# Patient Record
Sex: FEMALE | Race: WHITE | NOT HISPANIC OR LATINO | URBAN - METROPOLITAN AREA
[De-identification: names, ages, dates, MRNs, and addresses within clinical notes are randomized per-mention and may not be internally consistent; named-entity substitution may affect disease eponyms.]

---

## 2018-10-20 ENCOUNTER — INPATIENT (INPATIENT)
Facility: HOSPITAL | Age: 83
LOS: 5 days | Discharge: HOME | DRG: 65 | End: 2018-10-26
Attending: INTERNAL MEDICINE | Admitting: INTERNAL MEDICINE
Payer: MEDICARE

## 2018-10-20 VITALS — WEIGHT: 179.9 LBS | HEIGHT: 68 IN

## 2018-10-20 DIAGNOSIS — R42 DIZZINESS AND GIDDINESS: ICD-10-CM

## 2018-10-20 DIAGNOSIS — Z95.1 PRESENCE OF AORTOCORONARY BYPASS GRAFT: Chronic | ICD-10-CM

## 2018-10-20 PROBLEM — Z00.00 ENCOUNTER FOR PREVENTIVE HEALTH EXAMINATION: Status: ACTIVE | Noted: 2018-10-20

## 2018-10-20 LAB
ALBUMIN SERPL ELPH-MCNC: 3.8 G/DL — SIGNIFICANT CHANGE UP (ref 3.3–5.2)
ALP SERPL-CCNC: 55 U/L — SIGNIFICANT CHANGE UP (ref 40–120)
ALT FLD-CCNC: 17 U/L — SIGNIFICANT CHANGE UP
ANION GAP SERPL CALC-SCNC: 11 MMOL/L — SIGNIFICANT CHANGE UP (ref 5–17)
AST SERPL-CCNC: 21 U/L — SIGNIFICANT CHANGE UP
BASOPHILS # BLD AUTO: 0 K/UL — SIGNIFICANT CHANGE UP (ref 0–0.2)
BASOPHILS NFR BLD AUTO: 0.6 % — SIGNIFICANT CHANGE UP (ref 0–2)
BILIRUB SERPL-MCNC: 0.8 MG/DL — SIGNIFICANT CHANGE UP (ref 0.4–2)
BUN SERPL-MCNC: 42 MG/DL — HIGH (ref 8–20)
CALCIUM SERPL-MCNC: 10.3 MG/DL — HIGH (ref 8.6–10.2)
CHLORIDE SERPL-SCNC: 104 MMOL/L — SIGNIFICANT CHANGE UP (ref 98–107)
CO2 SERPL-SCNC: 25 MMOL/L — SIGNIFICANT CHANGE UP (ref 22–29)
CREAT SERPL-MCNC: 1.9 MG/DL — HIGH (ref 0.5–1.3)
EOSINOPHIL # BLD AUTO: 0.2 K/UL — SIGNIFICANT CHANGE UP (ref 0–0.5)
EOSINOPHIL NFR BLD AUTO: 2.2 % — SIGNIFICANT CHANGE UP (ref 0–6)
GLUCOSE SERPL-MCNC: 119 MG/DL — HIGH (ref 70–115)
HCT VFR BLD CALC: 44.2 % — SIGNIFICANT CHANGE UP (ref 37–47)
HGB BLD-MCNC: 14.9 G/DL — SIGNIFICANT CHANGE UP (ref 12–16)
LYMPHOCYTES # BLD AUTO: 2.8 K/UL — SIGNIFICANT CHANGE UP (ref 1–4.8)
LYMPHOCYTES # BLD AUTO: 35.2 % — SIGNIFICANT CHANGE UP (ref 20–55)
MAGNESIUM SERPL-MCNC: 2.1 MG/DL — SIGNIFICANT CHANGE UP (ref 1.8–2.6)
MCHC RBC-ENTMCNC: 31 PG — SIGNIFICANT CHANGE UP (ref 27–31)
MCHC RBC-ENTMCNC: 33.7 G/DL — SIGNIFICANT CHANGE UP (ref 32–36)
MCV RBC AUTO: 92.1 FL — SIGNIFICANT CHANGE UP (ref 81–99)
MONOCYTES # BLD AUTO: 0.8 K/UL — SIGNIFICANT CHANGE UP (ref 0–0.8)
MONOCYTES NFR BLD AUTO: 10.1 % — HIGH (ref 3–10)
NEUTROPHILS # BLD AUTO: 4.2 K/UL — SIGNIFICANT CHANGE UP (ref 1.8–8)
NEUTROPHILS NFR BLD AUTO: 51.7 % — SIGNIFICANT CHANGE UP (ref 37–73)
PLATELET # BLD AUTO: 224 K/UL — SIGNIFICANT CHANGE UP (ref 150–400)
POTASSIUM SERPL-MCNC: 5 MMOL/L — SIGNIFICANT CHANGE UP (ref 3.5–5.3)
POTASSIUM SERPL-SCNC: 5 MMOL/L — SIGNIFICANT CHANGE UP (ref 3.5–5.3)
PROT SERPL-MCNC: 7.4 G/DL — SIGNIFICANT CHANGE UP (ref 6.6–8.7)
RBC # BLD: 4.8 M/UL — SIGNIFICANT CHANGE UP (ref 4.4–5.2)
RBC # FLD: 12.2 % — SIGNIFICANT CHANGE UP (ref 11–15.6)
SODIUM SERPL-SCNC: 140 MMOL/L — SIGNIFICANT CHANGE UP (ref 135–145)
TROPONIN T SERPL-MCNC: 0.04 NG/ML — SIGNIFICANT CHANGE UP (ref 0–0.06)
WBC # BLD: 8 K/UL — SIGNIFICANT CHANGE UP (ref 4.8–10.8)
WBC # FLD AUTO: 8 K/UL — SIGNIFICANT CHANGE UP (ref 4.8–10.8)

## 2018-10-20 PROCEDURE — 70551 MRI BRAIN STEM W/O DYE: CPT | Mod: 26

## 2018-10-20 PROCEDURE — 93010 ELECTROCARDIOGRAM REPORT: CPT

## 2018-10-20 PROCEDURE — 99285 EMERGENCY DEPT VISIT HI MDM: CPT

## 2018-10-20 PROCEDURE — 99223 1ST HOSP IP/OBS HIGH 75: CPT

## 2018-10-20 PROCEDURE — 70450 CT HEAD/BRAIN W/O DYE: CPT | Mod: 26

## 2018-10-20 RX ORDER — DIAZEPAM 5 MG
1 TABLET ORAL DAILY
Qty: 0 | Refills: 0 | Status: DISCONTINUED | OUTPATIENT
Start: 2018-10-20 | End: 2018-10-26

## 2018-10-20 RX ORDER — ALLOPURINOL 300 MG
50 TABLET ORAL DAILY
Qty: 0 | Refills: 0 | Status: DISCONTINUED | OUTPATIENT
Start: 2018-10-20 | End: 2018-10-26

## 2018-10-20 RX ORDER — METOPROLOL TARTRATE 50 MG
100 TABLET ORAL DAILY
Qty: 0 | Refills: 0 | Status: DISCONTINUED | OUTPATIENT
Start: 2018-10-20 | End: 2018-10-20

## 2018-10-20 RX ORDER — LOSARTAN POTASSIUM 100 MG/1
1 TABLET, FILM COATED ORAL
Qty: 0 | Refills: 0 | COMMUNITY

## 2018-10-20 RX ORDER — ATORVASTATIN CALCIUM 80 MG/1
40 TABLET, FILM COATED ORAL AT BEDTIME
Qty: 0 | Refills: 0 | Status: DISCONTINUED | OUTPATIENT
Start: 2018-10-20 | End: 2018-10-26

## 2018-10-20 RX ORDER — ASPIRIN/CALCIUM CARB/MAGNESIUM 324 MG
81 TABLET ORAL DAILY
Qty: 0 | Refills: 0 | Status: DISCONTINUED | OUTPATIENT
Start: 2018-10-20 | End: 2018-10-26

## 2018-10-20 RX ORDER — SODIUM CHLORIDE 9 MG/ML
500 INJECTION INTRAMUSCULAR; INTRAVENOUS; SUBCUTANEOUS ONCE
Qty: 0 | Refills: 0 | Status: COMPLETED | OUTPATIENT
Start: 2018-10-20 | End: 2018-10-20

## 2018-10-20 RX ADMIN — Medication 1 MILLIGRAM(S): at 22:36

## 2018-10-20 RX ADMIN — ATORVASTATIN CALCIUM 40 MILLIGRAM(S): 80 TABLET, FILM COATED ORAL at 22:37

## 2018-10-20 RX ADMIN — SODIUM CHLORIDE 500 MILLILITER(S): 9 INJECTION INTRAMUSCULAR; INTRAVENOUS; SUBCUTANEOUS at 22:37

## 2018-10-20 RX ADMIN — Medication 81 MILLIGRAM(S): at 22:37

## 2018-10-20 NOTE — ED STATDOCS - PROGRESS NOTE DETAILS
86 y/o F pt with hx of multiple syncopic falls and CAD presents to ED with son at bedside. Per son, she  fell in July 2018; CT scan of head in [place] was reportedly normal but since that fall has had dizziness. Additionally she has worsened over the last week and 2 days ago developed gargled speech. will be transferred to [facility] for further evaluation by another provider.

## 2018-10-20 NOTE — H&P ADULT - HISTORY OF PRESENT ILLNESS
87y/oF primarily Serbian speaking, hx CAD s/p CABG, HTN, DM presenting with intermittent lightheadedness associated with episodes of difficulty speaking and progressive leg weakness for the past week.  Pt has just returned from [place] on 10/16 when she began to experience these symptoms.  Her children did not take her to hospital initially because they she might have been jet-lagged from flying or that it could have been an adverse effect from the valium that she uses for muscles spasms.   There was report of pt having altered mental status to ED providers, but pt and family denying that at this time.  In ED, pt had CT head that showed moderate microvascular changes and MRI brain showed acute infarcts left posterior periventricular white matter, and left external capsule.  EKG also showed Afib.  Pt and family denies hx of Afib. Pt denies any headache, visual changes, chest pain, palpitations, dyspnea, abdominal pain, nausea, vomiting, diarrhea. 87y/oF primarily [place] speaking, hx CAD s/p CABG, HTN, DM presenting with intermittent lightheadedness associated with episodes of difficulty speaking and progressive leg weakness for the past week.  Pt has just returned from [place] on 10/16 when she began to experience these symptoms.  Her children did not take her to hospital initially because they she might have been jet-lagged from flying or that it could have been an adverse effect from the valium that she uses for muscles spasms.  In ED, pt had CT head that showed moderate microvascular changes and MRI brain showed acute infarcts left posterior periventricular white matter, and left external capsule.  EKG also showed Afib.  Pt and family denies hx of Afib. Pt denies any headache, visual changes, chest pain, palpitations, dyspnea, abdominal pain, nausea, vomiting, diarrhea.

## 2018-10-20 NOTE — ED PROVIDER NOTE - MEDICAL DECISION MAKING DETAILS
Patient with dizziness; unstable gait and reports of intermittent AMS and slurred speech.  Will check EKG, Labs, CT and Re-eval.  Possible admission for r/o TIA.

## 2018-10-20 NOTE — H&P ADULT - NSHPSOCIALHISTORY_GEN_ALL_CORE
[place] in [place] during [place] except summer and early fall where she spends time in [place], denies any tobacco or alcohol use

## 2018-10-20 NOTE — H&P ADULT - NSHPPHYSICALEXAM_GEN_ALL_CORE
Vital Signs Last 24 Hrs  T(C): 36.6 (20 Oct 2018 11:06), Max: 36.6 (20 Oct 2018 11:06)  T(F): 97.8 (20 Oct 2018 11:06), Max: 97.8 (20 Oct 2018 11:06)  HR: 62 (20 Oct 2018 20:54) (62 - 65)  BP: 180/75 (20 Oct 2018 20:54) (159/79 - 180/75)  BP(mean): --  RR: 19 (20 Oct 2018 20:54) (17 - 19)  SpO2: 97% (20 Oct 2018 20:54) (96% - 97%)    GENERAL:  Well-appearing, not in acute distress  EYES:  Clear conjunctiva, pupils reactive to light  ENT: Moist mucous membranes  RESP:  Non-labored breathing pattern, lungs clear to ausculation   CV: Regular rate and rhythm, no murmurs appreciated, no lower extremity edema  GI: Soft, non-tender, non-distended  NEURO: Awake, alert, conversant, no facial symmetry, upper extremity strength 5/5, lower extremity strength 3/5 light touch sensation grossly intact  PSYCH: Calm, cooperative  SKIN: No rash or lesions, warm and dry

## 2018-10-20 NOTE — H&P ADULT - PMH
CAD (coronary artery disease)    DM (diabetes mellitus)    Gout    HTN (hypertension)    Meniere disease

## 2018-10-20 NOTE — H&P ADULT - ASSESSMENT
87y/oF primarily Welsh speaking, hx CAD s/p CABG, HTN, DM presenting with intermittent lightheadedness associated with episodes of difficulty speaking and progressive leg weakness for 1 week found to have acute CVA in left posterior periventricular white matter and left external capsule with Afib on EKG and no prior hx of Afib per pt/family    #Acute CVA in setting of ?new Afib   -Admit to step down unit  -Passed dysphagia screen in ED, recommended pureed diet  -ASA ordered  -Simvastatin changed to atorvastatin since higher potency  -HgbA1c, lipid panel  -PT, OT, speech and swallow ordered   -Neuro ([facility]) called for eval in AM  -Card ([facility]) called for eval in AM, re: suspected new onset Afib    #CAD-ASA, statin    #HTN- holding metoprolol and losartan to allow for permissive hypertensive during CVA    #DM- holding glipizide and Tradjenta insulin sliding scale ordered    #Muscle spasm- Valium PRN resumed    #Prophylactic measure- intermittent pneumatic compressions 87y/oF primarily Spanish speaking, hx CAD s/p CABG, HTN, DM presenting with intermittent lightheadedness associated with episodes of difficulty speaking and progressive leg weakness for 1 week found to have acute CVA in left posterior periventricular white matter and left external capsule with Afib on EKG and no prior hx of Afib per pt/family    #Acute CVA in setting of ?new Afib   -Admit to step down unit  -Passed dysphagia screen in ED, recommended pureed diet  -ASA ordered  -Simvastatin changed to atorvastatin since higher potency  -Carotid US ordered  -HgbA1c, lipid panel  -PT, OT, speech and swallow ordered   -Neuro ([facility]) called for eval in AM  -Card ([facility]) called for eval in AM, re: suspected new onset Afib    #CAD-ASA, statin    #HTN- holding metoprolol and losartan to allow for permissive hypertensive during CVA    #DM- holding glipizide and Tradjenta insulin sliding scale ordered    #Muscle spasm- Valium PRN resumed    #Prophylactic measure- intermittent pneumatic compressions 87y/oF primarily Nepali speaking, hx CAD s/p CABG, HTN, DM presenting with intermittent lightheadedness associated with episodes of difficulty speaking and progressive leg weakness for 1 week found to have acute CVA in left posterior periventricular white matter and left external capsule with Afib on EKG and no prior hx of Afib per pt/family    #Acute CVA in setting of new Afib   -Admit to step down unit  -Passed dysphagia screen in ED, recommended pureed diet  -ASA ordered  -Simvastatin changed to atorvastatin since higher potency  -Carotid US ordered  -HgbA1c, lipid panel  -PT, OT, speech and swallow ordered   -Neuro ([facility]) called for eval in AM  -Card ([facility]) called for eval in AM, re: new onset Afib    #CHAZ on ?CKD, unknown Cr baseline  -500cc NS fluid challenge  -Repeat BMP in AM    #CAD-ASA, statin    #HTN- holding metoprolol and losartan to allow for permissive hypertensive during CVA    #DM- holding glipizide and Tradjenta insulin sliding scale ordered    #Muscle spasm- Valium PRN resumed    #Prophylactic measure- intermittent pneumatic compressions

## 2018-10-20 NOTE — ED ADULT NURSE NOTE - NSIMPLEMENTINTERV_GEN_ALL_ED
Implemented All Fall with Harm Risk Interventions:  [place] to call system. Call bell, personal items and telephone within reach. Instruct patient to call for assistance. Room bathroom lighting operational. Non-slip footwear when patient is off stretcher. Physically safe environment: no spills, clutter or unnecessary equipment. Stretcher in lowest position, wheels locked, appropriate side rails in place. Provide visual cue, wrist band, yellow gown, etc. Monitor gait and stability. Monitor for mental status changes and reorient to person, place, and time. Review medications for side effects contributing to fall risk. Reinforce activity limits and safety measures with patient and family. Provide visual clues: red socks.

## 2018-10-20 NOTE — ED ADULT NURSE NOTE - NURSING NEURO ORIENTATION
oriented to person, place and time Secondary Intention Text (Leave Blank If You Do Not Want): The defect will heal with secondary intention.

## 2018-10-20 NOTE — ED PROVIDER NOTE - OBJECTIVE STATEMENT
This patient is an 87 year old woman with hx of DM and HTN who presents to the ER with her family with reports of AMS and dizziness.  Patient has been experiencing dizziness for over 2 weeks.  She does report a fall about 4 months ago but states that she had a negative CT head at that time.  Patient states that the dizziness is worse when she tries to ambulate.  Her family states that the patient has unsteady gait and has to hold on to furniture in order to prevent from falling over.  She denies headache, visual changes and vomiting.  Family also reports increased confusion for the past weeks and 2 days of intermittent slurred speech.  Patient has prior EKG that was done in [place].

## 2018-10-20 NOTE — H&P ADULT - NSHPLABSRESULTS_GEN_ALL_CORE
14.9   8.0   )-----------( 224      ( 20 Oct 2018 12:01 )               44.2   10-20    140  |  104  |  42.0<H>  ----------------------------<  119<H>  5.0   |  25.0  |  1.90<H>    Ca    10.3<H>      20 Oct 2018 12:01  Mg     2.1     10-20    TPro  7.4  /  Alb  3.8  /  TBili  0.8  /  DBili  x   /  AST  21  /  ALT  17  /  AlkPhos  55  10-20      EKG: Afib, bifascicular block, , QTc 508    < from: CT Head No Cont (10.20.18 @ 14:33) >    IMPRESSION:  Moderate chronic microvascular changes without evidence of   an acute transcortical infarction or hemorrhage. MR is a more sensitive   imaging modality for the evaluation of an acute infarction.       < from: MR Head No Cont (10.20.18 @ 18:42) >    IMPRESSION:   Acute infarcts left posterior periventricular white matter, and left external capsule.

## 2018-10-20 NOTE — ED ADULT TRIAGE NOTE - CHIEF COMPLAINT QUOTE
Fell and hit head in [place] in June.  Had negative CT scan.  [place] on 10[place].  Slight Confusion and dizziness began 2 weeks ago.  Confusion worse since arrival on 10/16. Difficulty speaking started 2 days ago.

## 2018-10-21 ENCOUNTER — TRANSCRIPTION ENCOUNTER (OUTPATIENT)
Age: 83
End: 2018-10-21

## 2018-10-21 DIAGNOSIS — I63.9 CEREBRAL INFARCTION, UNSPECIFIED: ICD-10-CM

## 2018-10-21 DIAGNOSIS — I25.10 ATHEROSCLEROTIC HEART DISEASE OF NATIVE CORONARY ARTERY WITHOUT ANGINA PECTORIS: ICD-10-CM

## 2018-10-21 DIAGNOSIS — E11.42 TYPE 2 DIABETES MELLITUS WITH DIABETIC POLYNEUROPATHY: ICD-10-CM

## 2018-10-21 DIAGNOSIS — I48.0 PAROXYSMAL ATRIAL FIBRILLATION: ICD-10-CM

## 2018-10-21 DIAGNOSIS — R26.81 UNSTEADINESS ON FEET: ICD-10-CM

## 2018-10-21 DIAGNOSIS — I10 ESSENTIAL (PRIMARY) HYPERTENSION: ICD-10-CM

## 2018-10-21 LAB
ANION GAP SERPL CALC-SCNC: 14 MMOL/L — SIGNIFICANT CHANGE UP (ref 5–17)
APPEARANCE UR: CLEAR — SIGNIFICANT CHANGE UP
BACTERIA # UR AUTO: ABNORMAL
BILIRUB UR-MCNC: NEGATIVE — SIGNIFICANT CHANGE UP
BUN SERPL-MCNC: 42 MG/DL — HIGH (ref 8–20)
CALCIUM SERPL-MCNC: 10.2 MG/DL — SIGNIFICANT CHANGE UP (ref 8.6–10.2)
CHLORIDE SERPL-SCNC: 108 MMOL/L — HIGH (ref 98–107)
CHOLEST SERPL-MCNC: 240 MG/DL — HIGH (ref 110–199)
CO2 SERPL-SCNC: 19 MMOL/L — LOW (ref 22–29)
COLOR SPEC: YELLOW — SIGNIFICANT CHANGE UP
CREAT ?TM UR-MCNC: 91 MG/DL — SIGNIFICANT CHANGE UP
CREAT SERPL-MCNC: 1.64 MG/DL — HIGH (ref 0.5–1.3)
DIFF PNL FLD: ABNORMAL
EPI CELLS # UR: SIGNIFICANT CHANGE UP
GLUCOSE BLDC GLUCOMTR-MCNC: 166 MG/DL — HIGH (ref 70–99)
GLUCOSE BLDC GLUCOMTR-MCNC: 194 MG/DL — HIGH (ref 70–99)
GLUCOSE BLDC GLUCOMTR-MCNC: 212 MG/DL — HIGH (ref 70–99)
GLUCOSE SERPL-MCNC: 181 MG/DL — HIGH (ref 70–115)
GLUCOSE UR QL: 50 MG/DL
HBA1C BLD-MCNC: 7.5 % — HIGH (ref 4–5.6)
HDLC SERPL-MCNC: 41 MG/DL — LOW
KETONES UR-MCNC: NEGATIVE — SIGNIFICANT CHANGE UP
LEUKOCYTE ESTERASE UR-ACNC: ABNORMAL
LIPID PNL WITH DIRECT LDL SERPL: 155 MG/DL — SIGNIFICANT CHANGE UP
NITRITE UR-MCNC: NEGATIVE — SIGNIFICANT CHANGE UP
OSMOLALITY UR: 533 MOSM/KG — SIGNIFICANT CHANGE UP (ref 300–1000)
PH UR: 6 — SIGNIFICANT CHANGE UP (ref 5–8)
POTASSIUM SERPL-MCNC: 4.3 MMOL/L — SIGNIFICANT CHANGE UP (ref 3.5–5.3)
POTASSIUM SERPL-SCNC: 4.3 MMOL/L — SIGNIFICANT CHANGE UP (ref 3.5–5.3)
PROT UR-MCNC: 100 MG/DL
RBC CASTS # UR COMP ASSIST: SIGNIFICANT CHANGE UP /HPF (ref 0–4)
SODIUM SERPL-SCNC: 141 MMOL/L — SIGNIFICANT CHANGE UP (ref 135–145)
SODIUM UR-SCNC: 70 MMOL/L — SIGNIFICANT CHANGE UP
SP GR SPEC: 1.01 — SIGNIFICANT CHANGE UP (ref 1.01–1.02)
TOTAL CHOLESTEROL/HDL RATIO MEASUREMENT: 6 RATIO — SIGNIFICANT CHANGE UP (ref 3.3–7.1)
TRIGL SERPL-MCNC: 219 MG/DL — HIGH (ref 10–200)
UROBILINOGEN FLD QL: NEGATIVE MG/DL — SIGNIFICANT CHANGE UP
WBC UR QL: ABNORMAL

## 2018-10-21 PROCEDURE — 99223 1ST HOSP IP/OBS HIGH 75: CPT

## 2018-10-21 PROCEDURE — 93880 EXTRACRANIAL BILAT STUDY: CPT | Mod: 26

## 2018-10-21 PROCEDURE — 71045 X-RAY EXAM CHEST 1 VIEW: CPT | Mod: 26

## 2018-10-21 PROCEDURE — 93306 TTE W/DOPPLER COMPLETE: CPT | Mod: 26

## 2018-10-21 PROCEDURE — 99233 SBSQ HOSP IP/OBS HIGH 50: CPT

## 2018-10-21 RX ORDER — HYDRALAZINE HCL 50 MG
10 TABLET ORAL EVERY 6 HOURS
Qty: 0 | Refills: 0 | Status: DISCONTINUED | OUTPATIENT
Start: 2018-10-21 | End: 2018-10-26

## 2018-10-21 RX ORDER — DEXTROSE 50 % IN WATER 50 %
25 SYRINGE (ML) INTRAVENOUS ONCE
Qty: 0 | Refills: 0 | Status: DISCONTINUED | OUTPATIENT
Start: 2018-10-21 | End: 2018-10-23

## 2018-10-21 RX ORDER — APIXABAN 2.5 MG/1
2.5 TABLET, FILM COATED ORAL EVERY 12 HOURS
Qty: 0 | Refills: 0 | Status: DISCONTINUED | OUTPATIENT
Start: 2018-10-21 | End: 2018-10-26

## 2018-10-21 RX ORDER — ENOXAPARIN SODIUM 100 MG/ML
80 INJECTION SUBCUTANEOUS DAILY
Qty: 0 | Refills: 0 | Status: DISCONTINUED | OUTPATIENT
Start: 2018-10-21 | End: 2018-10-21

## 2018-10-21 RX ORDER — INSULIN LISPRO 100/ML
VIAL (ML) SUBCUTANEOUS
Qty: 0 | Refills: 0 | Status: DISCONTINUED | OUTPATIENT
Start: 2018-10-21 | End: 2018-10-23

## 2018-10-21 RX ORDER — SODIUM CHLORIDE 9 MG/ML
1000 INJECTION, SOLUTION INTRAVENOUS
Qty: 0 | Refills: 0 | Status: DISCONTINUED | OUTPATIENT
Start: 2018-10-21 | End: 2018-10-26

## 2018-10-21 RX ORDER — DEXTROSE 50 % IN WATER 50 %
15 SYRINGE (ML) INTRAVENOUS ONCE
Qty: 0 | Refills: 0 | Status: DISCONTINUED | OUTPATIENT
Start: 2018-10-21 | End: 2018-10-23

## 2018-10-21 RX ORDER — INSULIN LISPRO 100/ML
VIAL (ML) SUBCUTANEOUS AT BEDTIME
Qty: 0 | Refills: 0 | Status: DISCONTINUED | OUTPATIENT
Start: 2018-10-21 | End: 2018-10-23

## 2018-10-21 RX ORDER — GLUCAGON INJECTION, SOLUTION 0.5 MG/.1ML
1 INJECTION, SOLUTION SUBCUTANEOUS ONCE
Qty: 0 | Refills: 0 | Status: DISCONTINUED | OUTPATIENT
Start: 2018-10-21 | End: 2018-10-26

## 2018-10-21 RX ORDER — DEXTROSE 50 % IN WATER 50 %
12.5 SYRINGE (ML) INTRAVENOUS ONCE
Qty: 0 | Refills: 0 | Status: DISCONTINUED | OUTPATIENT
Start: 2018-10-21 | End: 2018-10-23

## 2018-10-21 RX ORDER — HALOPERIDOL DECANOATE 100 MG/ML
3 INJECTION INTRAMUSCULAR EVERY 6 HOURS
Qty: 0 | Refills: 0 | Status: DISCONTINUED | OUTPATIENT
Start: 2018-10-21 | End: 2018-10-26

## 2018-10-21 RX ADMIN — HALOPERIDOL DECANOATE 3 MILLIGRAM(S): 100 INJECTION INTRAMUSCULAR at 09:08

## 2018-10-21 RX ADMIN — HALOPERIDOL DECANOATE 3 MILLIGRAM(S): 100 INJECTION INTRAMUSCULAR at 21:22

## 2018-10-21 RX ADMIN — Medication 10 MILLIGRAM(S): at 09:07

## 2018-10-21 RX ADMIN — APIXABAN 2.5 MILLIGRAM(S): 2.5 TABLET, FILM COATED ORAL at 21:21

## 2018-10-21 RX ADMIN — Medication 2: at 13:39

## 2018-10-21 RX ADMIN — Medication 10 MILLIGRAM(S): at 16:29

## 2018-10-21 RX ADMIN — Medication 81 MILLIGRAM(S): at 13:39

## 2018-10-21 RX ADMIN — Medication 1 MILLIGRAM(S): at 03:09

## 2018-10-21 RX ADMIN — Medication 50 MILLIGRAM(S): at 13:39

## 2018-10-21 RX ADMIN — Medication 1: at 17:45

## 2018-10-21 RX ADMIN — ATORVASTATIN CALCIUM 40 MILLIGRAM(S): 80 TABLET, FILM COATED ORAL at 21:22

## 2018-10-21 NOTE — CONSULT NOTE ADULT - SUBJECTIVE AND OBJECTIVE BOX
HPI:  87y/oF primarily [place] speaking, hx CAD s/p CABG, HTN, DM , does not speal english, by admision note she had been c/o with intermittent lightheadedness associated with episodes of difficulty speaking and leg weakness for the past week.  details history not available, patient not co-operative and does not undestand and speak english. No relatives available at the bedside. no reprots of any feve in the notes. In ED, pt had CT head that showed moderate microvascular changes and MRI brain showed small  acute infarcts left posterior periventricular white matter, and left external capsule.  EKG also showed Afib.  Pt and family denies hx of Afib.   PAST MEDICAL & SURGICAL HISTORY:  Gout  Meniere disease  CAD (coronary artery disease)  HTN (hypertension)  DM (diabetes mellitus)  S/P CABG (coronary artery bypass graft)      REVIEW OF SYSTEMS:    unable to obtain.   MEDICATIONS  (STANDING):  allopurinol 50 milliGRAM(s) Oral daily  aspirin enteric coated 81 milliGRAM(s) Oral daily  atorvastatin 40 milliGRAM(s) Oral at bedtime  dextrose 5%. 1000 milliLiter(s) (50 mL/Hr) IV Continuous <Continuous>  dextrose 50% Injectable 12.5 Gram(s) IV Push once  dextrose 50% Injectable 25 Gram(s) IV Push once  dextrose 50% Injectable 25 Gram(s) IV Push once  enoxaparin Injectable 80 milliGRAM(s) SubCutaneous daily  insulin lispro (HumaLOG) corrective regimen sliding scale   SubCutaneous three times a day before meals  insulin lispro (HumaLOG) corrective regimen sliding scale   SubCutaneous at bedtime    MEDICATIONS  (PRN):  dextrose 40% Gel 15 Gram(s) Oral once PRN Blood Glucose LESS THAN 70 milliGRAM(s)/deciliter  diazepam    Tablet 1 milliGRAM(s) Oral daily PRN Muscle Spasm  glucagon  Injectable 1 milliGRAM(s) IntraMuscular once PRN Glucose LESS THAN 70 milligrams/deciliter  haloperidol    Injectable 3 milliGRAM(s) IntraMuscular every 6 hours PRN agitation  hydrALAZINE Injectable 10 milliGRAM(s) IV Push every 6 hours PRN systolic bp greater than 160 mm/hg      Allergies    No Known Allergies    Intolerances        SOCIAL HISTORY:    FAMILY HISTORY:  Family history of diabetes mellitus (Father, Mother)      PHYSICAL EXAM:  Vital Signs Last 24 Hrs  T(F): 98 (10-21-18 @ 07:30)  HR: 78 (10-21-18 @ 07:30)  BP: 197/114 (10-21-18 @ 07:30)  RR: 18 (10-21-18 @ 07:30)    GENERAL: NAD, well-groomed, well-developed  HEAD:  Atraumatic, Normocephalic  EYES: EOMI, PERRLA,   NECK: Supple,  NERVOUS SYSTEM:  Alert & awake,  cranial nerves II-XII normal,   Good concentration; Motor Strength able to lift bilateral upper and lower extremities without focal weakness. DTRs 2+ intact and symmetric, plantar responses flexor bilaterally, sensory exam not done. n    LABS:                        14.9   8.0   )-----------( 224      ( 20 Oct 2018 12:01 )             44.2     10-    141  |  108<H>  |  42.0<H>  ----------------------------<  181<H>  4.3   |  19.0<L>  |  1.64<H>    Ca    10.2      21 Oct 2018 08:25  Mg     2.1     10-20    TPro  7.4  /  Alb  3.8  /  TBili  0.8  /  DBili  x   /  AST  21  /  ALT  17  /  AlkPhos  55  10-20      Urinalysis Basic - ( 21 Oct 2018 02:07 )    Color: Yellow / Appearance: Clear / S.015 / pH: x  Gluc: x / Ketone: Negative  / Bili: Negative / Urobili: Negative mg/dL   Blood: x / Protein: 100 mg/dL / Nitrite: Negative   Leuk Esterase: Small / RBC: 0-2 /HPF / WBC 6-10   Sq Epi: x / Non Sq Epi: Occasional / Bacteria: Occasional        RADIOLOGY & ADDITIONAL STUDIES:

## 2018-10-21 NOTE — CONSULT NOTE ADULT - ASSESSMENT
2 small cav left hemisphere , suspect cardioembolic, recommend cardiology evaluation for anticoagulation, no neurology contraindiacation for anticoagulation as strokes are small and unlikely to bleed, possibly new anticoagulant may be better, ok to start one of them without heparin bridge. recommend carotid ultrasound and cardiac work will be up per cardiology. .

## 2018-10-21 NOTE — PROGRESS NOTE ADULT - PROBLEM SELECTOR PLAN 6
Patient is on bed rest until pt eval, but on 10/21 she continues to thrash about making her very high risk for fall.

## 2018-10-21 NOTE — DISCHARGE NOTE ADULT - MEDICATION SUMMARY - MEDICATIONS TO CHANGE
I will SWITCH the dose or number of times a day I take the medications listed below when I get home from the hospital:    metoprolol tartrate 100 mg oral tablet  -- 1 tab(s) by mouth once a day

## 2018-10-21 NOTE — CONSULT NOTE ADULT - ASSESSMENT
88 y/o female presents with difficulty speaking and leg weakness, found to have acute CVA and new onset A-fib.

## 2018-10-21 NOTE — ED ADULT NURSE REASSESSMENT NOTE - GENERAL PATIENT STATE
family/SO at bedside
pt sitting up in chair at end of bed/family/SO at bedside/comfortable appearance
anxious/crying

## 2018-10-21 NOTE — CONSULT NOTE ADULT - PROBLEM SELECTOR RECOMMENDATION 9
-Metoprolol 50 mg PO BID  -CHADS-VASc 7, needs AC but high risk for falls in setting of new leg weakness, will discuss risk vs benefit  -echocardiogram

## 2018-10-21 NOTE — DISCHARGE NOTE ADULT - CARE PROVIDER_API CALL
Gunner Soto), Cardiovascular Disease  [address] [place] [address]  Phone: [phone]  Fax: [phone]    Constantine Lr), Neurology  [address] [place] [address]  Phone: [phone]  Fax: [phone]

## 2018-10-21 NOTE — CONSULT NOTE ADULT - SUBJECTIVE AND OBJECTIVE BOX
History obtained by: Patient and medical record     obtained: No, pt refused    Chief complaint:  "Please go away, that's enough for today"    HPI:  87y/oF primarily Bulgarian speaking, hx CAD s/p CABG, HTN, DM presenting with intermittent lightheadedness associated with episodes of difficulty speaking and progressive leg weakness for the past week.  Pt has just returned from [facility] on 10/16 when she began to experience these symptoms.  Her children did not take her to hospital initially because they she might have been jet-lagged from flying or that it could have been an adverse effect from the valium that she uses for muscles spasms.  In ED, pt had CT head that showed moderate microvascular changes and MRI brain showed acute infarcts left posterior periventricular white matter, and left external capsule.  EKG also showed Afib.  Pt and family denies hx of Afib. Pt denies any headache, visual changes, chest pain, palpitations, dyspnea, abdominal pain, nausea, vomiting, diarrhea. (20 Oct 2018 22:11)    Above HPI reviewed. Upon my arrival, pt appears upset and initially refuses to be interviewed and examined but later more cooperative. EKG shows A-fib with  bpm, bifascicular block and inverted T-waves in I and aVL. However, sinus rhythm noted on telemetry since recording started at 8PM. Pt denies chest pain, palpitations, SOB or leg swelling. Troponin negative. Systolic murmur noted on auscultation.        REVIEW OF SYMPTOMS:   Cardiovascular:   as per HPI  Respiratory:  denies dyspnea,   cough,     Genitourinary:  No dysuria, no hematuria;   Gastrointestinal:   No dark color stool, no melena, no diarrhea, no constipation, no abdominal pain;   Neurological: as per HPI  Psychiatric: No agitation, no anxiety.  ALL OTHER REVIEW OF SYSTEMS ARE NEGATIVE.    MEDICATIONS  (STANDING):  allopurinol 50 milliGRAM(s) Oral daily  aspirin enteric coated 81 milliGRAM(s) Oral daily  atorvastatin 40 milliGRAM(s) Oral at bedtime  dextrose 5%. 1000 milliLiter(s) (50 mL/Hr) IV Continuous <Continuous>  dextrose 50% Injectable 12.5 Gram(s) IV Push once  dextrose 50% Injectable 25 Gram(s) IV Push once  dextrose 50% Injectable 25 Gram(s) IV Push once  insulin lispro (HumaLOG) corrective regimen sliding scale   SubCutaneous three times a day before meals  insulin lispro (HumaLOG) corrective regimen sliding scale   SubCutaneous at bedtime    MEDICATIONS  (PRN):  dextrose 40% Gel 15 Gram(s) Oral once PRN Blood Glucose LESS THAN 70 milliGRAM(s)/deciliter  diazepam    Tablet 1 milliGRAM(s) Oral daily PRN Muscle Spasm  glucagon  Injectable 1 milliGRAM(s) IntraMuscular once PRN Glucose LESS THAN 70 milligrams/deciliter        PAST MEDICAL & SURGICAL HISTORY:  Gout  Meniere disease  CAD (coronary artery disease)  HTN (hypertension)  DM (diabetes mellitus)  S/P CABG (coronary artery bypass graft)      FAMILY HISTORY:  Family history of diabetes mellitus (Father, Mother)      SOCIAL HISTORY:    CIGARETTES:       ALCOHOL:    DRUGS:    Vital Signs Last 24 Hrs  T(C): 36.6 (20 Oct 2018 11:06), Max: 36.6 (20 Oct 2018 11:06)  T(F): 97.8 (20 Oct 2018 11:06), Max: 97.8 (20 Oct 2018 11:06)  HR: 62 (20 Oct 2018 20:54) (62 - 65)  BP: 180/75 (20 Oct 2018 20:54) (159/79 - 180/75)  BP(mean): --  RR: 19 (20 Oct 2018 20:54) (17 - 19)  SpO2: 97% (20 Oct 2018 20:54) (96% - 97%)    PHYSICAL EXAM:  General: WN/WD NAD  Neurology: A&Ox3, nonfocal, LACKEY x 4  Eyes: PERRL/ EOMI, Gross vision intact  ENT/Neck: Neck supple, trachea midline, No JVD, Gross hearing intact  Respiratory: CTA B/L, No wheezing, rales, rhonchi  CV: RRR, S1S2, systolic murmur 3/6  Abdominal: Soft, NT, ND +BS,   Extremities: No edema, + peripheral pulses  Skin: No Rashes, Hematoma, Ecchymosis        INTERPRETATION OF TELEMETRY: SR 80's    ECG: A-fib with RVR, bifascicular block, inverted T-waves in I and aVL      I&O's Detail      LABS:                        14.9   8.0   )-----------( 224      ( 20 Oct 2018 12:01 )             44.2     10-20    140  |  104  |  42.0<H>  ----------------------------<  119<H>  5.0   |  25.0  |  1.90<H>    Ca    10.3<H>      20 Oct 2018 12:01  Mg     2.1     10-20    TPro  7.4  /  Alb  3.8  /  TBili  0.8  /  DBili  x   /  AST  21  /  ALT  17  /  AlkPhos  55  10-20    CARDIAC MARKERS ( 20 Oct 2018 12:01 )  x     / 0.04 ng/mL / 153 U/L / x     / 5.3 ng/mL        Urinalysis Basic - ( 21 Oct 2018 02:07 )    Color: Yellow / Appearance: Clear / S.015 / pH: x  Gluc: x / Ketone: Negative  / Bili: Negative / Urobili: Negative mg/dL   Blood: x / Protein: 100 mg/dL / Nitrite: Negative   Leuk Esterase: Small / RBC: 0-2 /HPF / WBC 6-10   Sq Epi: x / Non Sq Epi: Occasional / Bacteria: Occasional      I&O's Summary      RADIOLOGY & ADDITIONAL STUDIES:    < from: CT Head No Cont (10.20.18 @ 14:33) >    IMPRESSION:  Moderate chronic microvascular changes without evidence of   an acute transcortical infarction or hemorrhage. MR is a more sensitive   imaging modality for the evaluation of an acute infarction.       TAMIKA DIOP M.D., ATTENDING RADIOLOGIST  This document has been electronically signed. Oct 20 2018  3:31PM    < end of copied text >  < from: MR Head No Cont (10.20.18 @ 18:42) >  IMPRESSION:   Acute infarcts leftposterior periventricular white matter, and left   external   capsule.    ******PRELIMINARY REPORT******    ******PRELIMINARY REPORT******          MERLE RIVERA M.D.;[facility] RADIOLOGIST    < end of copied text >      PREVIOUS DIAGNOSTIC TESTING:      ECHO: n/a    STRESS: n/a    CATHETERIZATION: n/a History obtained by: Patient and medical record     obtained: No, pt refused    Chief complaint:  "Please go away, that's enough for today"    HPI:  87y/oF primarily Indonesian speaking, hx CAD s/p CABG, HTN, DM presenting with intermittent lightheadedness associated with episodes of difficulty speaking and progressive leg weakness for the past week.  Pt has just returned from [facility] on 10/16 when she began to experience these symptoms.  Her children did not take her to hospital initially because they she might have been jet-lagged from flying or that it could have been an adverse effect from the valium that she uses for muscles spasms.  In ED, pt had CT head that showed moderate microvascular changes and MRI brain showed acute infarcts left posterior periventricular white matter, and left external capsule.  EKG also showed Afib.  Pt and family denies hx of Afib. Pt denies any headache, visual changes, chest pain, palpitations, dyspnea, abdominal pain, nausea, vomiting, diarrhea. (20 Oct 2018 22:11)    Above HPI reviewed. Upon my arrival, pt appears upset and initially refuses to be interviewed and examined but later more cooperative. EKG shows A-fib with  bpm, bifascicular block and inverted T-waves in I and aVL. However, sinus rhythm noted on telemetry since recording started at 8PM. Pt denies chest pain, palpitations, SOB or leg swelling. Troponin negative. Systolic murmur noted on auscultation.        REVIEW OF SYMPTOMS:   Cardiovascular:   as per HPI  Respiratory:  denies dyspnea,   cough,     Genitourinary:  No dysuria, no hematuria;   Gastrointestinal:   No dark color stool, no melena, no diarrhea, no constipation, no abdominal pain;   Neurological: as per HPI  Psychiatric: No agitation, no anxiety.  ALL OTHER REVIEW OF SYSTEMS ARE NEGATIVE.    MEDICATIONS  (STANDING):  allopurinol 50 milliGRAM(s) Oral daily  aspirin enteric coated 81 milliGRAM(s) Oral daily  atorvastatin 40 milliGRAM(s) Oral at bedtime  dextrose 5%. 1000 milliLiter(s) (50 mL/Hr) IV Continuous <Continuous>  dextrose 50% Injectable 12.5 Gram(s) IV Push once  dextrose 50% Injectable 25 Gram(s) IV Push once  dextrose 50% Injectable 25 Gram(s) IV Push once  insulin lispro (HumaLOG) corrective regimen sliding scale   SubCutaneous three times a day before meals  insulin lispro (HumaLOG) corrective regimen sliding scale   SubCutaneous at bedtime    MEDICATIONS  (PRN):  dextrose 40% Gel 15 Gram(s) Oral once PRN Blood Glucose LESS THAN 70 milliGRAM(s)/deciliter  diazepam    Tablet 1 milliGRAM(s) Oral daily PRN Muscle Spasm  glucagon  Injectable 1 milliGRAM(s) IntraMuscular once PRN Glucose LESS THAN 70 milligrams/deciliter        PAST MEDICAL & SURGICAL HISTORY:  Gout  Meniere disease  CAD (coronary artery disease)  HTN (hypertension)  DM (diabetes mellitus)  S/P CABG (coronary artery bypass graft)      FAMILY HISTORY:  Family history of diabetes mellitus (Father, Mother)      SOCIAL HISTORY:    CIGARETTES:   None	    ALCOHOL: None    DRUGS:    Vital Signs Last 24 Hrs  T(C): 36.6 (20 Oct 2018 11:06), Max: 36.6 (20 Oct 2018 11:06)  T(F): 97.8 (20 Oct 2018 11:06), Max: 97.8 (20 Oct 2018 11:06)  HR: 62 (20 Oct 2018 20:54) (62 - 65)  BP: 180/75 (20 Oct 2018 20:54) (159/79 - 180/75)  BP(mean): --  RR: 19 (20 Oct 2018 20:54) (17 - 19)  SpO2: 97% (20 Oct 2018 20:54) (96% - 97%)    PHYSICAL EXAM:  General: WN/WD NAD  Neurology: A&Ox3, nonfocal, LACKEY x 4  Eyes: PERRL/ EOMI, Gross vision intact  ENT/Neck: Neck supple, trachea midline, No JVD, Gross hearing intact  Respiratory: CTA B/L, No wheezing, rales, rhonchi  CV: RRR, S1S2, systolic murmur 3/6 best at left upper sternal border  Abdominal: Soft, NT, ND +BS,   Extremities: No edema, + peripheral pulses  Skin: No Rashes, Hematoma, Ecchymosis        INTERPRETATION OF TELEMETRY: SR 80's    ECG: A-fib with RVR, bifascicular block, inverted T-waves in I and aVL      I&O's Detail      LABS:                        14.9   8.0   )-----------( 224      ( 20 Oct 2018 12:01 )             44.2     10-20    140  |  104  |  42.0<H>  ----------------------------<  119<H>  5.0   |  25.0  |  1.90<H>    Ca    10.3<H>      20 Oct 2018 12:01  Mg     2.1     10-20    TPro  7.4  /  Alb  3.8  /  TBili  0.8  /  DBili  x   /  AST  21  /  ALT  17  /  AlkPhos  55  10-20    CARDIAC MARKERS ( 20 Oct 2018 12:01 )  x     / 0.04 ng/mL / 153 U/L / x     / 5.3 ng/mL        Urinalysis Basic - ( 21 Oct 2018 02:07 )    Color: Yellow / Appearance: Clear / S.015 / pH: x  Gluc: x / Ketone: Negative  / Bili: Negative / Urobili: Negative mg/dL   Blood: x / Protein: 100 mg/dL / Nitrite: Negative   Leuk Esterase: Small / RBC: 0-2 /HPF / WBC 6-10   Sq Epi: x / Non Sq Epi: Occasional / Bacteria: Occasional      I&O's Summary      RADIOLOGY & ADDITIONAL STUDIES:    < from: CT Head No Cont (10.20.18 @ 14:33) >    IMPRESSION:  Moderate chronic microvascular changes without evidence of   an acute transcortical infarction or hemorrhage. MR is a more sensitive   imaging modality for the evaluation of an acute infarction.       TAMIKA DIOP M.D., ATTENDING RADIOLOGIST  This document has been electronically signed. Oct 20 2018  3:31PM    < end of copied text >  < from: MR Head No Cont (10.20.18 @ 18:42) >  IMPRESSION:   Acute infarcts leftposterior periventricular white matter, and left   external   capsule.    ******PRELIMINARY REPORT******    ******PRELIMINARY REPORT******          MERLE RIVERA M.D.;[facility] RADIOLOGIST    < end of copied text >      PREVIOUS DIAGNOSTIC TESTING:      ECHO: n/a    STRESS: n/a    CATHETERIZATION: n/a

## 2018-10-21 NOTE — ED ADULT NURSE REASSESSMENT NOTE - NIH STROKE SCALE: 6B. MOTOR LEG, RIGHT, QM
(2) Some effort against gravity; leg falls to bed by 5 secs, but has some effort against gravity
(0) No drift; leg holds 30 degree position for full 5 secs

## 2018-10-21 NOTE — DISCHARGE NOTE ADULT - PLAN OF CARE
Rehabilitation You have been diagnosed with an acute stroke during this hospitalization. It is important to make sure that you continue to take all medications as prescribed to help prevent having another stroke event. If you have been recommended to do Physical or Speech therapy, it is important that you participate to the best of ability. If you were advised to follow up with Neurology and/or Cardiology, be sure to do so. If you feel that you are having similar symptoms again, be sure to seek medical attention immediately as it is possible to have another stroke even if you just had one. Goal <140/90 Be sure to follow a low salt diet. If you have been prescribed antihypertensive medications to control your blood pressure, be sure to take them every day as prescribed and do not miss any doses, the medications do not work if they are not taken consistently. Follow up with your Primary Care Doctor and have your Blood Pressure checked. Control of rate Continue medication for rate/rhythm control. Continue medication for stroke prevention if advised to use one (blood thinner such as Aspirin, Coumadin/Warfarin, Xarelto, Eliquis). Continue following a healthy lifestyle, this includes exercising 150minutes a week or as much as tolerated, and eating a healthy balanced diet consisting of fresh fruits and veggies. Be sure to take all medications as prescribed, do not miss them! Follow up with your primary care doctor and/or Cardiologist. If you have chest pain, be sure to come to the ER immediately for an evaluation. Follow a low carb low sugar diet. Continue to take all antidiabetic medications/insulin as prescribed. Follow up with your Primary Care Doctor regularly for blood sugar/A1c checks. Be sure to see an eye doctor and foot doctor on an annual basis.

## 2018-10-21 NOTE — DISCHARGE NOTE ADULT - MEDICATION SUMMARY - MEDICATIONS TO TAKE
I will START or STAY ON the medications listed below when I get home from the hospital:    aspirin 81 mg oral delayed release tablet  -- 1 tab(s) by mouth once a day  -- Indication: For CAD (coronary artery disease)    losartan 50 mg oral tablet  -- 1 tab(s) by mouth once a day  -- Indication: For HTN (hypertension)    amiodarone 200 mg oral tablet  -- 2 tab(s) by mouth every 12 hours  -- Indication: For Paroxysmal atrial fibrillation    apixaban 2.5 mg oral tablet  -- 1 tab(s) by mouth every 12 hours  -- Indication: For Paroxysmal atrial fibrillation    glipiZIDE 10 mg oral tablet  -- 1 tab(s) by mouth once a day  -- Indication: For Diabetes    Tradjenta 5 mg oral tablet  -- 1 tab(s) by mouth once a day  -- Indication: For Diabetes    allopurinol 100 mg oral tablet  -- 0.5 tab(s) by mouth once a day  -- Indication: For Gout    atorvastatin 40 mg oral tablet  -- 1 tab(s) by mouth once a day (at bedtime)  -- Indication: For HLD    metoprolol tartrate 25 mg oral tablet  -- 1 tab(s) by mouth 3 times a day  -- Indication: For HTN (hypertension)

## 2018-10-21 NOTE — DISCHARGE NOTE ADULT - OTHER SIGNIFICANT FINDINGS
< from: CT Head No Cont (10.20.18 @ 14:33) >  TECHNIQUE: Noncontrast CT of the head. Multiplanar reformations are   submitted.    FINDINGS: Old right cerebellar hemisphere lacunar infarcts.  There is periventricular and subcortical white matter hypodensity without   mass effect, nonspecific, likely representing moderate chronic   microvascular ischemic changes. There is no compelling evidence for an   acute transcortical infarction. There is no evidence of mass, mass   effect, midline shift or extra-axial fluid collection. The lateral   ventricles and cortical sulci are age-appropriate in size and   configuration. The patient is status post left ocular lens replacement   surgery. Mild polypoid inflammatory mucosal changes are seen throughout   the various portions of the paranasal sinuses. The orbits and mastoid air   cells are otherwise unremarkable. The calvarium is intact.    IMPRESSION:  Moderate chronic microvascular changes without evidence of   an acute transcortical infarction or hemorrhage. MR is a more sensitive   imaging modality for the evaluation of an acute infarction.     < end of copied text >      < from: MR Head No Cont (10.20.18 @ 18:42) >    IMPRESSION:      1)  fairly extensive chronic ischemic changes throughout the white matter   of both hemispheres as well as basal ganglia and posterior fossa..  2)  small acute infarct left posterior lentiform nucleus and   periventricular white matter..        < end of copied text >                            15.8   11.5  )-----------( 249      ( 23 Oct 2018 10:31 )             47.8     10-23    143  |  106  |  36.0<H>  ----------------------------<  366<H>  4.6   |  19.0<L>  |  1.68<H>    Ca    10.1      23 Oct 2018 10:31  Phos  2.5     10-23  Mg     2.1     10-23

## 2018-10-21 NOTE — ED ADULT NURSE REASSESSMENT NOTE - NS ED NURSE REASSESS COMMENT FT1
PT hypertensive with increased confusion 1:1 continued MD Barreto notified. md will come to evaluate patient at bedside.
Pt growing increasingly agitated with staying in hospital, adamantly stating she does not need to be here and she wants to call her son to pick her up. Pt made aware multiple times she is admitted to the hospital due to her symptoms and needing to have certain tests before she can leave and that her son is well aware of where she is. Will continue to monitor closely. Pt refusing vital signs.
pt Bhutanese speaking with minimal english presents with son and daughter-in-law reporting pt has been experiencing dizziness and confusion for past couple months. state pt had a fall about 3 months ago. reports she woke up yesterday acting confused. at this time family reports "she still seems a little confused". iv access obtained, pt changed into gown. made aware of POC.
1:1 continued for increase ams with weakness and flight risk.

## 2018-10-21 NOTE — PROGRESS NOTE ADULT - PROBLEM SELECTOR PLAN 3
Cardiology input appreciated, On full dose lovenox for now, consider increasing the dose as renal function seems to be improving.

## 2018-10-21 NOTE — PROGRESS NOTE ADULT - SUBJECTIVE AND OBJECTIVE BOX
THEO BOWEN     Chief Complaint: Patient is a 87y old  Female who presents with a chief complaint of lightheadedness, difficulty speaking, weakness (21 Oct 2018 04:02)      PAST MEDICAL & SURGICAL HISTORY:  Gout  Meniere disease  CAD (coronary artery disease)  HTN (hypertension)  DM (diabetes mellitus)  S/P CABG (coronary artery bypass graft)      HPI/OVERNIGHT EVENTS: Patient confused, speaking Indonesian. She is under constant supervision as she appears confused and clutching at anything she can grab.    MEDICATIONS  (STANDING):  allopurinol 50 milliGRAM(s) Oral daily  aspirin enteric coated 81 milliGRAM(s) Oral daily  atorvastatin 40 milliGRAM(s) Oral at bedtime  dextrose 5%. 1000 milliLiter(s) (50 mL/Hr) IV Continuous <Continuous>  dextrose 50% Injectable 12.5 Gram(s) IV Push once  dextrose 50% Injectable 25 Gram(s) IV Push once  dextrose 50% Injectable 25 Gram(s) IV Push once  enoxaparin Injectable 80 milliGRAM(s) SubCutaneous daily  insulin lispro (HumaLOG) corrective regimen sliding scale   SubCutaneous three times a day before meals  insulin lispro (HumaLOG) corrective regimen sliding scale   SubCutaneous at bedtime      Vital Signs Last 24 Hrs  T(C): 36.7 (21 Oct 2018 07:30), Max: 36.7 (21 Oct 2018 07:30)  T(F): 98 (21 Oct 2018 07:30), Max: 98 (21 Oct 2018 07:30)  HR: 78 (21 Oct 2018 07:30) (62 - 78)  BP: 197/114 (21 Oct 2018 07:30) (159/79 - 197/114)  BP(mean): --  RR: 18 (21 Oct 2018 07:30) (17 - 19)  SpO2: 99% (21 Oct 2018 07:30) (96% - 99%)    PHYSICAL EXAM:  Constitutional:  Patient appears younger than stated age, but obviously confused   HEENT: PERRLA, EOMI, Normal Hearing, MMM  Neck: No LAD, No JVD  Back: Normal spine flexure, No CVA tenderness  Respiratory: CTAB Cardiovascular: S1 and S2, RRR, no M/G/R  Gastrointestinal: BS+, soft, NT/ND  Extremities: No peripheral edema  Vascular: 2+ peripheral pulses  Neurological: [place] speaking and climbing out of bed,    CAPILLARY BLOOD GLUCOSE    LABS:                        14.9   8.0   )-----------( 224      ( 20 Oct 2018 12:01 )             44.2     10-    141  |  108<H>  |  42.0<H>  ----------------------------<  181<H>  4.3   |  19.0<L>  |  1.64<H>    Ca    10.2      21 Oct 2018 08:25  Mg     2.1     10-20    TPro  7.4  /  Alb  3.8  /  TBili  0.8  /  DBili  x   /  AST  21  /  ALT  17  /  AlkPhos  55  10-20      Urinalysis Basic - ( 21 Oct 2018 02:07 )    Color: Yellow / Appearance: Clear / S.015 / pH: x  Gluc: x / Ketone: Negative  / Bili: Negative / Urobili: Negative mg/dL   Blood: x / Protein: 100 mg/dL / Nitrite: Negative   Leuk Esterase: Small / RBC: 0-2 /HPF / WBC 6-10   Sq Epi: x / Non Sq Epi: Occasional / Bacteria: Occasional        RADIOLOGY & ADDITIONAL TESTS:

## 2018-10-21 NOTE — DISCHARGE NOTE ADULT - ADDITIONAL INSTRUCTIONS
You are being transferred to acute rehabilitation.   Follow up with your Primary care Doctor and Cardiologist within 1-2 weeks of discharge.

## 2018-10-21 NOTE — DISCHARGE NOTE ADULT - HOSPITAL COURSE
87 year old female acute cva on mri, Meniere disease, atrial fibrillation, htn, hyperlipidemia. MRI shows scattered chronic ischemic changes as well as acute changes in lentiform nucleus. Course complicated by episodes afib with RVR for which cardiology was consulted and patient was started on amiodorone by [facility]. Her anticoagulation for Afib was also adjusted based on renal function. Neurology was consulted on the case for her CVA with recommendations to continue anticoagulation without any further acute workup. Echo was performed and showed moderate Aortic stenosis with EF >75%. Carotid Dopplers were negative. PT and PMR was called on case and recommended patient for acute rehab. 87 year old female acute cva on mri, Meniere disease, atrial fibrillation, htn, hyperlipidemia. MRI shows scattered chronic ischemic changes as well as acute changes in lentiform nucleus. Course complicated by episodes afib with RVR for which cardiology was consulted and patient was started on amiodorone by [facility]. Her anticoagulation for Afib was also adjusted based on renal function. Neurology was consulted on the case for her CVA with recommendations to continue anticoagulation without any further acute workup. Echo was performed and showed moderate Aortic stenosis with EF >75%. Carotid Dopplers were negative. PT and PMR was called on case and recommended patient for acute rehab to which patient is now pending discharge. 87 year old female acute cva on mri, Meniere disease, atrial fibrillation, htn, hyperlipidemia. MRI shows scattered chronic ischemic changes as well as acute changes in lentiform nucleus. Course complicated by episodes afib with RVR for which cardiology was consulted and patient was started on amiodorone by [facility]. Her anticoagulation for Afib was also adjusted based on renal function. Neurology was consulted on the case for her CVA with recommendations to continue anticoagulation without any further acute workup. Echo was performed and showed moderate Aortic stenosis with EF >75%. Carotid Dopplers were negative. PT and PMR was called on case and recommended patient for acute rehab to which patient is now pending discharge. 87 year old female acute cva on mri, Meniere disease, atrial fibrillation, htn, hyperlipidemia. MRI shows scattered chronic ischemic changes as well as acute changes in lentiform nucleus. Course complicated by episodes afib with RVR for which cardiology was consulted and patient was started on amiodorone by [facility]. Her anticoagulation for Afib was also adjusted based on renal function. Neurology was consulted on the case for her CVA with recommendations to continue anticoagulation without any further acute workup. Echo was performed and showed moderate Aortic stenosis with EF >75%. Carotid Dopplers were negative. PT and PMR was called on case and recommended patient for acute rehab to which patient is now pending discharge. 87 year old female acute cva on mri, Meniere disease, atrial fibrillation, htn, hyperlipidemia. MRI shows scattered chronic ischemic changes as well as acute changes in lentiform nucleus. Course complicated by episodes afib with RVR for which cardiology was consulted and patient was started on amiodorone by [facility]. Her anticoagulation for Afib was also adjusted based on renal function. Neurology was consulted on the case for her CVA with recommendations to continue anticoagulation without any further acute workup. Echo was performed and showed moderate Aortic stenosis with EF >75%. Carotid Dopplers were negative. PT and PMR was called on case and recommended patient for acute rehab to which patient is now pending discharge.     87 year old female acute cva on mri, Meniere disease, atrial fibrillation, htn, hyperlipidemia. MRI shows scattered chronic ischemic changes as well as acute changes in lentiform nucleus.  On 10/22 Patient continues to act confused and fighting to get out of bed requiring constant observation.  On 10/23 She is acting more appropriately but she continues to have episodes of atrial fibrillation with rapid ventricular response. I spoke with DR Soto from cardiology who will follow up. She remains hypertensive, resume losartan. 10/24 await placement. 10/25 Patient continues to require constant observation as she climbs out of bed and nearly fell on 10/25. On 10/26 she is much better and stable for discharge to rehab. She is an 87 year old female with cva, and atrial fibrillation who is at extremely high risk for redurrent events, especially if she does not comply with therapy.     Problem/Plan - 1:  ·  Problem: Cerebrovascular accident (CVA), unspecified mechanism.  Plan: Patient is markedly confused on 10/21 requiring constant observation. Continue aspirin, lipitor and lovenox full dose. On 10/22 She has been converted to low dose eliquis. Neurology input appreciated, consider discontinue aspirin and only continue eliquis for anticoagulation. 10/23 Patient is acting more appropriately.  Stable for transfer to rehab with close outpatient follow up.      Problem/Plan - 2:  ·  Problem: Hypertension, unspecified type.  Plan: Add hydralazine prn for markedly elevated BP. On 10/22 BP slightly improved but still elevated. 10/23 She remains hypertensive, resume losartan.      Problem/Plan - 3:  ·  Problem: Paroxysmal atrial fibrillation.  Plan: Cardiology input appreciated, Full dose lovenox has been discontinued and now the patient is on Eliquis and aspirin. consider discontinuing aspirin. 10/23 She continues to have episodes of rapid ventricular response, cardiology re-consulted.      Problem/Plan - 4:  ·  Problem: Type 2 diabetes mellitus with diabetic polyneuropathy, without long-term current use of insulin.  Plan: Monitor bgm. 10/23 She is hyperglycemic, add lantus and humalog pre-meal bolus as well as scale coverage.      Problem/Plan - 5:  ·  Problem: Coronary artery disease involving native coronary artery of native heart without angina pectoris.  Plan: Consider ischemia work up after acute cva stabilizes. Follow up with cardiology as an outpatient.      Problem/Plan - 6:  Problem: Unsteady gait. Plan: Patient is on bed rest until pt eval, but on 10/21 she continues to thrash about making her very high risk for fall. On 10/23 She is sitting in a chair better behaved.       Attending Attestation:   I was physically present for the key portions of the evaluation and management (E/M) service provided.  I agree with the above history, physical, and plan which I have reviewed and edited where appropriate.     47 minutes spent on total encounter; more than 50% of the visit was spent counseling and/or coordinating care by the attending physician.

## 2018-10-21 NOTE — ED ADULT NURSE REASSESSMENT NOTE - COMFORT CARE
plan of care explained
plan of care explained
meal provided/po fluids offered/wait time explained/plan of care explained

## 2018-10-21 NOTE — DISCHARGE NOTE ADULT - NS AS DC STROKE ED MATERIALS
Stroke Education Booklet/Stroke Warning Signs and Symptoms/Need for Followup After Discharge/Call 911 for Stroke/Risk Factors for Stroke/Prescribed Medications Stroke Warning Signs and Symptoms/Call 911 for Stroke/Stroke Education Booklet/Risk Factors for Stroke/Prescribed Medications/Need for Followup After Discharge

## 2018-10-21 NOTE — DISCHARGE NOTE ADULT - CARE PLAN
Principal Discharge DX:	Cerebrovascular accident (CVA), unspecified mechanism  Goal:	Rehabilitation  Assessment and plan of treatment:	You have been diagnosed with an acute stroke during this hospitalization. It is important to make sure that you continue to take all medications as prescribed to help prevent having another stroke event. If you have been recommended to do Physical or Speech therapy, it is important that you participate to the best of ability. If you were advised to follow up with Neurology and/or Cardiology, be sure to do so. If you feel that you are having similar symptoms again, be sure to seek medical attention immediately as it is possible to have another stroke even if you just had one.  Secondary Diagnosis:	Hypertension, unspecified type  Goal:	Goal <140/90  Assessment and plan of treatment:	Be sure to follow a low salt diet. If you have been prescribed antihypertensive medications to control your blood pressure, be sure to take them every day as prescribed and do not miss any doses, the medications do not work if they are not taken consistently. Follow up with your Primary Care Doctor and have your Blood Pressure checked.  Secondary Diagnosis:	Paroxysmal atrial fibrillation  Goal:	Control of rate  Assessment and plan of treatment:	Continue medication for rate/rhythm control. Continue medication for stroke prevention if advised to use one (blood thinner such as Aspirin, Coumadin/Warfarin, Xarelto, Eliquis).  Secondary Diagnosis:	CAD (coronary artery disease)  Assessment and plan of treatment:	Continue following a healthy lifestyle, this includes exercising 150minutes a week or as much as tolerated, and eating a healthy balanced diet consisting of fresh fruits and veggies. Be sure to take all medications as prescribed, do not miss them! Follow up with your primary care doctor and/or Cardiologist. If you have chest pain, be sure to come to the ER immediately for an evaluation.  Secondary Diagnosis:	Type 2 diabetes mellitus with diabetic polyneuropathy, without long-term current use of insulin  Assessment and plan of treatment:	Follow a low carb low sugar diet. Continue to take all antidiabetic medications/insulin as prescribed. Follow up with your Primary Care Doctor regularly for blood sugar/A1c checks. Be sure to see an eye doctor and foot doctor on an annual basis.

## 2018-10-21 NOTE — ED ADULT NURSE REASSESSMENT NOTE - NIH STROKE SCALE: 10. DYSARTHRIA, QM
(1) Mild-to-moderate dysarthria; patient slurs at least some words and, at worst, can be understood with some difficulty
(1) Mild-to-moderate dysarthria; patient slurs at least some words and, at worst, can be understood with some difficulty

## 2018-10-21 NOTE — CHART NOTE - NSCHARTNOTEFT_GEN_A_CORE
Called by RN for 'confusion' and pt attempting to get out of bed.  Went to assess pt at bedside who is awake, alert, not agitated, speaking Libyan mixed with English.  Her mental status appears similar to when pt was seen earlier in the night, except she appears more anxious and keeps saying 'she wants to go home'.  No family at bedside.  Constant observation ordered.

## 2018-10-21 NOTE — DISCHARGE NOTE ADULT - PATIENT PORTAL LINK FT
You can access the [facility] Portal, offered by [facility], by registering with the following website: [email or web address]

## 2018-10-21 NOTE — DISCHARGE NOTE ADULT - SECONDARY DIAGNOSIS.
Hypertension, unspecified type Paroxysmal atrial fibrillation CAD (coronary artery disease) Type 2 diabetes mellitus with diabetic polyneuropathy, without long-term current use of insulin

## 2018-10-22 LAB
ANION GAP SERPL CALC-SCNC: 14 MMOL/L — SIGNIFICANT CHANGE UP (ref 5–17)
BUN SERPL-MCNC: 39 MG/DL — HIGH (ref 8–20)
CALCIUM SERPL-MCNC: 10 MG/DL — SIGNIFICANT CHANGE UP (ref 8.6–10.2)
CHLORIDE SERPL-SCNC: 109 MMOL/L — HIGH (ref 98–107)
CO2 SERPL-SCNC: 20 MMOL/L — LOW (ref 22–29)
CREAT SERPL-MCNC: 1.78 MG/DL — HIGH (ref 0.5–1.3)
GLUCOSE BLDC GLUCOMTR-MCNC: 212 MG/DL — HIGH (ref 70–99)
GLUCOSE BLDC GLUCOMTR-MCNC: 212 MG/DL — HIGH (ref 70–99)
GLUCOSE BLDC GLUCOMTR-MCNC: 270 MG/DL — HIGH (ref 70–99)
GLUCOSE BLDC GLUCOMTR-MCNC: 298 MG/DL — HIGH (ref 70–99)
GLUCOSE SERPL-MCNC: 325 MG/DL — HIGH (ref 70–115)
MAGNESIUM SERPL-MCNC: 2 MG/DL — SIGNIFICANT CHANGE UP (ref 1.6–2.6)
PHOSPHATE SERPL-MCNC: 2.6 MG/DL — SIGNIFICANT CHANGE UP (ref 2.4–4.7)
POTASSIUM SERPL-MCNC: 4.3 MMOL/L — SIGNIFICANT CHANGE UP (ref 3.5–5.3)
POTASSIUM SERPL-SCNC: 4.3 MMOL/L — SIGNIFICANT CHANGE UP (ref 3.5–5.3)
SODIUM SERPL-SCNC: 143 MMOL/L — SIGNIFICANT CHANGE UP (ref 135–145)

## 2018-10-22 PROCEDURE — 93010 ELECTROCARDIOGRAM REPORT: CPT

## 2018-10-22 PROCEDURE — 99233 SBSQ HOSP IP/OBS HIGH 50: CPT

## 2018-10-22 RX ORDER — METOPROLOL TARTRATE 50 MG
25 TABLET ORAL THREE TIMES A DAY
Qty: 0 | Refills: 0 | Status: DISCONTINUED | OUTPATIENT
Start: 2018-10-22 | End: 2018-10-26

## 2018-10-22 RX ORDER — METOPROLOL TARTRATE 50 MG
5 TABLET ORAL ONCE
Qty: 0 | Refills: 0 | Status: COMPLETED | OUTPATIENT
Start: 2018-10-22 | End: 2018-10-22

## 2018-10-22 RX ORDER — METOPROLOL TARTRATE 50 MG
25 TABLET ORAL
Qty: 0 | Refills: 0 | Status: DISCONTINUED | OUTPATIENT
Start: 2018-10-22 | End: 2018-10-22

## 2018-10-22 RX ORDER — METOPROLOL TARTRATE 50 MG
5 TABLET ORAL EVERY 6 HOURS
Qty: 0 | Refills: 0 | Status: DISCONTINUED | OUTPATIENT
Start: 2018-10-22 | End: 2018-10-26

## 2018-10-22 RX ADMIN — Medication 25 MILLIGRAM(S): at 20:21

## 2018-10-22 RX ADMIN — Medication 81 MILLIGRAM(S): at 11:56

## 2018-10-22 RX ADMIN — Medication 5 MILLIGRAM(S): at 20:26

## 2018-10-22 RX ADMIN — Medication 2: at 06:21

## 2018-10-22 RX ADMIN — APIXABAN 2.5 MILLIGRAM(S): 2.5 TABLET, FILM COATED ORAL at 08:11

## 2018-10-22 RX ADMIN — APIXABAN 2.5 MILLIGRAM(S): 2.5 TABLET, FILM COATED ORAL at 20:21

## 2018-10-22 RX ADMIN — HALOPERIDOL DECANOATE 3 MILLIGRAM(S): 100 INJECTION INTRAMUSCULAR at 06:21

## 2018-10-22 RX ADMIN — Medication 5 MILLIGRAM(S): at 15:54

## 2018-10-22 RX ADMIN — Medication 2: at 16:37

## 2018-10-22 RX ADMIN — Medication 50 MILLIGRAM(S): at 11:56

## 2018-10-22 RX ADMIN — Medication 5 MILLIGRAM(S): at 15:33

## 2018-10-22 RX ADMIN — Medication 1: at 20:21

## 2018-10-22 RX ADMIN — ATORVASTATIN CALCIUM 40 MILLIGRAM(S): 80 TABLET, FILM COATED ORAL at 20:21

## 2018-10-22 RX ADMIN — Medication 3: at 12:00

## 2018-10-22 NOTE — CHART NOTE - NSCHARTNOTEFT_GEN_A_CORE
Called by RN as patient had an episode of asymptomatic tachycardia in 160s. EKG at beside showed a-fib w/ RVR which converted to NSR s/p 2 IV pushes of lopressor 5mg. Labs ordered & will replete PRN to keep K+ >4 & Mg > 2.

## 2018-10-22 NOTE — PROGRESS NOTE ADULT - SUBJECTIVE AND OBJECTIVE BOX
INTERVAL HISTORY:        VITAL SIGNS:  Vital Signs Last 24 Hrs  T(C): 37.2 (22 Oct 2018 08:34), Max: 37.2 (22 Oct 2018 08:34)  T(F): 99 (22 Oct 2018 08:34), Max: 99 (22 Oct 2018 08:34)  HR: 98 (22 Oct 2018 08:12) (86 - 106)  BP: 163/78 (22 Oct 2018 08:12) (146/72 - 244/180)  BP(mean): 94 (22 Oct 2018 08:12) (89 - 96)  RR: 15 (22 Oct 2018 08:12) (15 - 20)  SpO2: 98% (22 Oct 2018 08:12) (95% - 99%)    PHYSICAL EXAMINATION:    Mentation:  awake and cooperative  Language/Speech:  CN: nl  Visual Fields: full  Motor: mild right hemiparesis  Sensory:  DTR:  Babinski:      MEDS:  MEDICATIONS  (STANDING):  allopurinol 50 milliGRAM(s) Oral daily  apixaban 2.5 milliGRAM(s) Oral every 12 hours  aspirin enteric coated 81 milliGRAM(s) Oral daily  atorvastatin 40 milliGRAM(s) Oral at bedtime  dextrose 5%. 1000 milliLiter(s) (50 mL/Hr) IV Continuous <Continuous>  dextrose 50% Injectable 12.5 Gram(s) IV Push once  dextrose 50% Injectable 25 Gram(s) IV Push once  dextrose 50% Injectable 25 Gram(s) IV Push once  insulin lispro (HumaLOG) corrective regimen sliding scale   SubCutaneous three times a day before meals  insulin lispro (HumaLOG) corrective regimen sliding scale   SubCutaneous at bedtime  metoprolol tartrate 25 milliGRAM(s) Oral two times a day    MEDICATIONS  (PRN):  dextrose 40% Gel 15 Gram(s) Oral once PRN Blood Glucose LESS THAN 70 milliGRAM(s)/deciliter  diazepam    Tablet 1 milliGRAM(s) Oral daily PRN Muscle Spasm  glucagon  Injectable 1 milliGRAM(s) IntraMuscular once PRN Glucose LESS THAN 70 milligrams/deciliter  haloperidol    Injectable 3 milliGRAM(s) IntraMuscular every 6 hours PRN agitation  hydrALAZINE Injectable 10 milliGRAM(s) IV Push every 6 hours PRN systolic bp greater than 160 mm/hg      LABS:                          14.9   8.0   )-----------( 224      ( 20 Oct 2018 12:01 )             44.2     10-21    141  |  108<H>  |  42.0<H>  ----------------------------<  181<H>  4.3   |  19.0<L>  |  1.64<H>    Ca    10.2      21 Oct 2018 08:25  Mg     2.1     10-20    TPro  7.4  /  Alb  3.8  /  TBili  0.8  /  DBili  x   /  AST  21  /  ALT  17  /  AlkPhos  55  10-20    LIVER FUNCTIONS - ( 20 Oct 2018 12:01 )  Alb: 3.8 g/dL / Pro: 7.4 g/dL / ALK PHOS: 55 U/L / ALT: 17 U/L / AST: 21 U/L / GGT: x               RADIOLOGY & ADDITIONAL STUDIES:      < from: MR Head No Cont (10.20.18 @ 18:42) >  1)  fairly extensive chronic ischemic changes throughout the white matter   of both hemispheres as well as basal ganglia and posterior fossa..  2)  small acute infarct left posterior lentiform nucleus and   periventricular white matter..        < end of copied text >    IMPRESSION & PLAN:    Cerebral infarct. - microvascular disease secondary to Hypertension vs afib with cardioembolic mechanism  she is currently on both aspirin and apixiban. MR shows extensive MVD which along with age puts her at somehat higher increased resk of cerebral bleed  Would advise apixiban only (no antiplatelet therapy) if no clear cardiac need for both    Will not actively follow.   Neurologically cleared for discharge/disposition.  Please recontact as needed.

## 2018-10-22 NOTE — PROGRESS NOTE ADULT - ASSESSMENT
87F hx CABG 4 years ago with post operative atrial fibrillation then treated with anticoagulation that was stopped after several months now presents with difficulty speaking and leg weakness, found to have acute CVA and new onset A-fib.  CHADSVasc score of 7.   Neurology evaluation agrees that the risk of further stroke greater than the risk of intracranial bleeding as the strokes are small.  Pt is started on low dose Eliquis.    Paroxysmal atrial fibrillation, Periods of RVR  Metoprolol  with holding parameters added.  CHADS-VASc 7  On Eliquis    Moderate AS  Echo:  EF >75. Mod AS.      Cerebrovascular accident (CVA), unspecified mechanism  neurology on the case  Cont  ASA 81 mg, -Lipitor 40 mg  Carotid dopplers. No sign stenosis. Incidentally noted heterogeneity of the thyroid gland. Nonemergent formal   thyroid sonogram can be performed as clinically indicated.        CAD (coronary artery disease)  ASA/BB/statin      Hypertension  Monitor BP  On IV hydralazine    CHAZ  hold Losartan   Monitor cr (1.64)      CARDIO MEDS  hydrALAZINE Injectable 10 milliGRAM(s) IV Push every 6 hours PRN  atorvastatin 40 milliGRAM(s) Oral at bedtime  apixaban 2.5 milliGRAM(s) Oral every 12 hours  aspirin enteric coated 81 milliGRAM(s) Oral daily

## 2018-10-22 NOTE — PROGRESS NOTE ADULT - SUBJECTIVE AND OBJECTIVE BOX
THEO BOWEN     Chief Complaint: Patient is a 87y old  Female who presents with a chief complaint of lightheadedness, difficulty speaking, weakness (22 Oct 2018 10:26)      PAST MEDICAL & SURGICAL HISTORY:  Gout  Meniere disease  CAD (coronary artery disease)  HTN (hypertension)  DM (diabetes mellitus)  S/P CABG (coronary artery bypass graft)      HPI/OVERNIGHT EVENTS: Patient continues on constant observation as she tries to get out of bed and sometimes requires Haldol.    MEDICATIONS  (STANDING):  allopurinol 50 milliGRAM(s) Oral daily  apixaban 2.5 milliGRAM(s) Oral every 12 hours  aspirin enteric coated 81 milliGRAM(s) Oral daily  atorvastatin 40 milliGRAM(s) Oral at bedtime  dextrose 5%. 1000 milliLiter(s) (50 mL/Hr) IV Continuous <Continuous>  dextrose 50% Injectable 12.5 Gram(s) IV Push once  dextrose 50% Injectable 25 Gram(s) IV Push once  dextrose 50% Injectable 25 Gram(s) IV Push once  insulin lispro (HumaLOG) corrective regimen sliding scale   SubCutaneous three times a day before meals  insulin lispro (HumaLOG) corrective regimen sliding scale   SubCutaneous at bedtime  metoprolol tartrate 25 milliGRAM(s) Oral two times a day      Vital Signs Last 24 Hrs  T(C): 36.6 (22 Oct 2018 12:30), Max: 37.2 (22 Oct 2018 08:34)  T(F): 97.8 (22 Oct 2018 12:30), Max: 99 (22 Oct 2018 08:34)  HR: 95 (22 Oct 2018 12:01) (86 - 106)  BP: 142/63 (22 Oct 2018 12:01) (142/63 - 244/180)  BP(mean): 87 (22 Oct 2018 12:01) (87 - 96)  RR: 18 (22 Oct 2018 12:01) (15 - 20)  SpO2: 97% (22 Oct 2018 12:01) (95% - 98%)    PHYSICAL EXAM:  Constitutional:  sleeping, arousable  HEENT: PERRLA, EOMI, Normal Hearing, MMM  Neck: No LAD, No JVD  Back: Normal spine flexure, No CVA tenderness  Respiratory: CTAB Cardiovascular: S1 and S2, RRR, no M/G/R  Gastrointestinal: BS+, soft, NT/ND  Extremities: No peripheral edema  Vascular: 2+ peripheral pulses  Neurological: confused, She doesn't speak english.    CAPILLARY BLOOD GLUCOSE    LABS:    10-    141  |  108<H>  |  42.0<H>  ----------------------------<  181<H>  4.3   |  19.0<L>  |  1.64<H>    Ca    10.2      21 Oct 2018 08:25        Urinalysis Basic - ( 21 Oct 2018 02:07 )    Color: Yellow / Appearance: Clear / S.015 / pH: x  Gluc: x / Ketone: Negative  / Bili: Negative / Urobili: Negative mg/dL   Blood: x / Protein: 100 mg/dL / Nitrite: Negative   Leuk Esterase: Small / RBC: 0-2 /HPF / WBC 6-10   Sq Epi: x / Non Sq Epi: Occasional / Bacteria: Occasional        RADIOLOGY & ADDITIONAL TESTS:

## 2018-10-22 NOTE — OCCUPATIONAL THERAPY INITIAL EVALUATION ADULT - NS ASR FOLLOW COMMAND OT EVAL
able to follow single-step instructions/75% of the time/aphasia/oral apraxia/pt requires increased time and repetition to follow commands of assessment; pt with decreased sustained attention to task with external distractions 75% of the time/aphasia/oral apraxia/unable to follow multi-step instructions/pt requires increased time and repetition to follow commands of assessment; pt with decreased sustained attention to task with external distractions; pt required visual/verbal cues for redirection to task/able to follow single-step instructions

## 2018-10-22 NOTE — PROGRESS NOTE ADULT - PROBLEM SELECTOR PLAN 3
Cardiology input appreciated, Full dose lovenox has been discontinued and now the patient is on Eliquis and aspirin. consider discontinuing aspirin.

## 2018-10-22 NOTE — SWALLOW BEDSIDE ASSESSMENT ADULT - SLP GENERAL OBSERVATIONS
Pt received asleep, OOB in bedside chair, arousable to voice but lethargic throughout and requiring maximal cues to sustain arousal, Vietnamese speaking, however reportedly understands English, t1:1 present at bedside

## 2018-10-22 NOTE — PHYSICAL THERAPY INITIAL EVALUATION ADULT - IMPAIRMENTS FOUND, PT EVAL
neuromotor development and sensory integration/muscle strength/gait, locomotion, and balance/aerobic capacity/endurance

## 2018-10-22 NOTE — PROGRESS NOTE ADULT - ASSESSMENT
87 year old female acute cva on mri, Meniere disease, atrial fibrillation, htn, hyperlipidemia. MRI shows scattered chronic ischemic changes as well as acute changes in lentiform nucleus.  On 10/22 Patient continues to act confused and fighting to get out of bed requiring constant observation.

## 2018-10-22 NOTE — PROGRESS NOTE ADULT - PROBLEM SELECTOR PLAN 1
Patient is markedly confused on 10/21 requiring constant observation. Continue aspirin, lipitor and lovenox full dose. On 10/22 She has been converted to low dose eliquis. Neurology input appreciated, consider discontinue aspirin and only continue eliquis for anticoagulation.

## 2018-10-22 NOTE — SWALLOW BEDSIDE ASSESSMENT ADULT - SWALLOW EVAL: DIAGNOSIS
Oral and pharyngeal stages of swallow functional for puree/thin liquids. Swallow function confounded by lethargy at the time of this assessment.

## 2018-10-22 NOTE — SWALLOW BEDSIDE ASSESSMENT ADULT - SWALLOW EVAL: ORAL MUSCULATURE
unable to assess due to poor participation/comprehension/poor participation in oromotor eval 2* lethargy

## 2018-10-22 NOTE — SWALLOW BEDSIDE ASSESSMENT ADULT - ASR SWALLOW ASPIRATION MONITOR
position upright (89Y)/cough/change of breathing pattern/oral hygiene/fever/pneumonia/throat clearing/gurgly voice

## 2018-10-22 NOTE — PROGRESS NOTE ADULT - SUBJECTIVE AND OBJECTIVE BOX
CARDIOLOGY PROGRESS NOTE   ([facility])                                                                                                        Subjective: laying in bed, NAD, confused on 1:1    Vitals:  T(C): 37.2 (10-22-18 @ 08:34), Max: 37.2 (10-22-18 @ 08:34)  HR: 98 (10-22-18 @ 08:12) (86 - 106)  BP: 163/78 (10-22-18 @ 08:12) (146/72 - 244/180)  RR: 15 (10-22-18 @ 08:12) (15 - 20)  SpO2: 98% (10-22-18 @ 08:12) (95% - 99%)  Wt(kg): --  I&O's Summary    21 Oct 2018 07:  -  22 Oct 2018 07:00  --------------------------------------------------------  IN: 120 mL / OUT: 0 mL / NET: 120 mL    22 Oct 2018 07:01  -  22 Oct 2018 09:01  --------------------------------------------------------  IN: 0 mL / OUT: 200 mL / NET: -200 mL        Weight (kg): 88.6 (10- @ 18:49)    PHYSICAL EXAM:  Appearance: Normal	  HEENT:   Atraumatic  Cardiovascular: Normal S1 S2, No JVD, 2/6 ABRAHAN at RUSB, No edema  Respiratory: Lungs clear to auscultation	  Gastrointestinal:  Soft, Non-tender, + BS	  Skin: No rashes, No ecchymoses, No cyanosis  Neurologic: Alert and awake, able to move extremities  Extremities: No edema      ECG:  	    Ventricular Rate 102 BPM    Atrial Rate 92 BPM    QRS Duration 142 ms    Q-T Interval 390 ms    QTC Calculation(Bezet) 508 ms    R Axis -80 degrees    T Axis 100 degrees    Diagnosis Line Atrial fibrillation with rapid ventricular response with premature ventricular or aberrantly conducted complexes  Right bundle branch block  Left anterior fascicular block  *** Bifascicular block ***  Septal infarct , age undetermined  T wave abnormality, consider lateral ischemia      Confirmed by WOODY HIDALGO ([id number]) on 10/21/2018 3:08:54 PM      CURRENT MEDICATIONS:  hydrALAZINE Injectable 10 milliGRAM(s) IV Push every 6 hours PRN        diazepam    Tablet 1 milliGRAM(s) Oral daily PRN  haloperidol    Injectable 3 milliGRAM(s) IntraMuscular every 6 hours PRN      allopurinol 50 milliGRAM(s) Oral daily  atorvastatin 40 milliGRAM(s) Oral at bedtime  dextrose 40% Gel 15 Gram(s) Oral once PRN  dextrose 50% Injectable 12.5 Gram(s) IV Push once  dextrose 50% Injectable 25 Gram(s) IV Push once  dextrose 50% Injectable 25 Gram(s) IV Push once  glucagon  Injectable 1 milliGRAM(s) IntraMuscular once PRN  insulin lispro (HumaLOG) corrective regimen sliding scale   SubCutaneous three times a day before meals  insulin lispro (HumaLOG) corrective regimen sliding scale   SubCutaneous at bedtime    apixaban 2.5 milliGRAM(s) Oral every 12 hours  aspirin enteric coated 81 milliGRAM(s) Oral daily  dextrose 5%. 1000 milliLiter(s) IV Continuous <Continuous>      LABS:	 	                          14.9   8.0   )-----------( 224      ( 20 Oct 2018 12:01 )             44.2     10-    141  |  108<H>  |  42.0<H>  ----------------------------<  181<H>  4.3   |  19.0<L>  |  1.64<H>    Ca    10.2      21 Oct 2018 08:25  Mg     2.1     10-20    TPro  7.4  /  Alb  3.8  /  TBili  0.8  /  DBili  x   /  AST  21  /  ALT  17  /  AlkPhos  55  10-20    proBNP:   Lipid Profile: Date: 10-21 @ 08:25  Total cholesterol 240; Direct LDL: 155; HDL: 41; Triglycerides:219    Echo        EXAM:  ECHO TRANSTHORACIC COMP W DOPP      PROCEDURE DATE:  Oct 21 2018   .      INTERPRETATION:  REPORT:    TRANSTHORACIC ECHOCARDIOGRAM REPORT         Patient Name:   THEO BOWEN Patient Location: [facility] Rec #:  [id number]        Accession #:      [id number]  Account #:                      Height:           67.7 in 172.0 cm  YOB: 1931       Weight:           178.6 lb 81.00 kg  Patient Age:    87 years        BSA:              1.94 m²  Patient Gender: F               BP:               157/72 mmHg       Date of Exam:        10/21/2018 12:47:11 PM  Sonographer:         Andrei Og  Referring Physician: Michele Alvares MD    Procedure:     2D Echo/Doppler/Color Doppler Complete.  Indications:   Syncope and collapse - R55  Diagnosis:     Persistent atrial fibrillation - I48.1  Study Details: Study quality was adversely affected due to body habitus,   the                 patient being uncooperative, lung interference and patient   not                 able to turn on to left lateral decubitus.         2D AND M-MODE MEASUREMENTS (normal ranges within parentheses):  Left Ventricle:                  Normal   Aorta/Left Atrium:            Normal  IVSd (2D):              1.23 cm (0.7-1.1) Aortic Root (2D):  3.27 cm   (2.4-3.7)  LVPWd (2D):             1.04 cm (0.7-1.1) Left Atrium (2D):  3.86 cm   (1.9-4.0)  LVIDd (2D):             4.51 cm (3.4-5.7)  LVIDs (2D):             3.08 cm  LV FS (2D):             31.7 %   (>25%)  Relative Wall Thickness  0.46    (<0.42)    LV DIASTOLIC FUNCTION:  MV Peak E: 1.08 m/s Decel Time: 130 msec    SPECTRAL DOPPLER ANALYSIS (where applicable):  Mitral Valve:  MV P1/2 Time: 37.70 msec  MV Area, PHT: 5.84 cm²    Aortic Valve: AoV Max Sudhir: 3.10 m/s AoV Peak P.4 mmHg AoV Mean P.0 mmHg    LVOT Vmax: 1.09 m/s LVOT VTI: 0.268 m LVOT Diameter: 1.93 cm    AoV Area, Vmax: 1.03 cm² AoV Area, VTI: 1.01 cm² AoV Area, Vmn: 0.98 cm²  Ao VTI: 0.779  Tricuspid Valve and PA/RV Systolic Pressure: TR Max Velocity: 2.59 m/s RA   Pressure: 5 mmHg RVSP/PASP: 31.9 mmHg    Pulmonic Valve:  PV Max Velocity: 1.27 m/s PV Max P.5 mmHg PV Mean PG:       PHYSICIAN INTERPRETATION:  Left Ventricle: The left ventricular internal cavity size is normal.  Global LV systolic function was hyperdynamic. Left ventricular ejection   fraction, by visual estimation, is >75%. The mitral in-flow pattern   reveals no discernable A-wave, therefore no comment on diastolic function   can be made.  Right Ventricle: The right ventricular size is normal. RV systolic   function is normal.  Left Atrium: Moderately enlarged left atrium.  Right Atrium: The right atrium was not well visualized.  Pericardium: Trivial pericardial effusion is present.  Mitral Valve: Thickening and calcification of the anterior and posterior   mitral valve leaflets. Mitral leaflet mobility is normal. Mild mitral   valve regurgitation is seen.  Tricuspid Valve: The tricuspid valve is not well seen. Mild tricuspid   regurgitation is visualized.  Aortic Valve: Moderate aortic stenosis is present. Peak Av velocity is   3.10 m/s, mean transaortic gradient equals 25.0 mmHg, the calculated   aortic valve area equals 1.01 cm² by the continuity equation consistent   with moderate aortic stenosis. Mild aortic valve regurgitation is seen.   The Dimesionless Index value is 0.35.  Pulmonic Valve: The pulmonic valve is normal. Trace pulmonic valve   regurgitation.  Aorta: The aortic root is normal in size and structure.  Pulmonary Artery: The pulmonary artery is of normal size and origin.  Venous: The pulmonary veins were not well visualized. The inferior vena   cava is not well visualized.       Summary:   1. Hyperdynamic global left ventricular systolic function.   2. Left ventricular ejection fraction, by visual estimation, is >75%.   3. The mitral in-flow pattern reveals no discernable A-wave, therefore   no comment on diastolic function can be made.   4. There is no left ventricular hypertrophy.   5. Thickening and calcification of the anterior and posterior mitral   valve leaflets.   6. Mild mitral valve regurgitation.   7. Moderate aortic valve stenosis.   8. Peak transaortic gradient is 38.4 mmHg, mean transaortic gradient   equals 25.0 mmHg, the calculated aortic valve area equals 1.01 cm² by the   continuity equation consistent with moderate aortic stenosis.   9. The Dimesionless Index value is 0.35 which is consistent with   moderate aortic stenosis.  10. Mild aortic regurgitation.  11. Mild tricuspid regurgitation.  12. Trace pulmonic valve regurgitation.  13. Trivial pericardial effusion.    [id number] Rigo Tijerina MD, Electronically signed on 10/21/2018 at   2:44:41 PM

## 2018-10-22 NOTE — OCCUPATIONAL THERAPY INITIAL EVALUATION ADULT - PERTINENT HX OF CURRENT PROBLEM, REHAB EVAL
Pt presents to ED with c/o AMS and dizziness. Head MRI with fairly extensive chronic ischemic changes throughout the white matter of both hemispheres as well as basal ganglia and posterior fossa; small acute infarct left posterior lentiform nucleus and periventricular white matter.

## 2018-10-22 NOTE — OCCUPATIONAL THERAPY INITIAL EVALUATION ADULT - ADDITIONAL COMMENTS
Information provided by pt, should be verified for accuracy. Pt lives in house with no URIEL and 5 steps inside up to bedroom and bathroom. Bathroom has bathtub with curtains. Pt owns a RW. Pt is right handed.

## 2018-10-22 NOTE — SWALLOW BEDSIDE ASSESSMENT ADULT - SLP PERTINENT HISTORY OF CURRENT PROBLEM
Pt admitted for acute infarct in the left posterior periventricular white matter and left external capsule as per MRI results

## 2018-10-22 NOTE — OCCUPATIONAL THERAPY INITIAL EVALUATION ADULT - LEVEL OF INDEPENDENCE: EATING, OT EVAL
supervision/pt with active orders for aspiration precautions; see speech evaluation for recommendations

## 2018-10-22 NOTE — PHYSICAL THERAPY INITIAL EVALUATION ADULT - CRITERIA FOR SKILLED THERAPEUTIC INTERVENTIONS
rehab potential/anticipated equipment needs at discharge/impairments found/functional limitations in following categories/risk reduction/prevention/therapy frequency/anticipated discharge recommendation/predicted duration of therapy intervention

## 2018-10-22 NOTE — OCCUPATIONAL THERAPY INITIAL EVALUATION ADULT - PERSONAL SAFETY AND JUDGMENT, REHAB EVAL
i.e. attempting to walk without walker, not following commands at times/impaired/at risk behaviors demonstrated

## 2018-10-22 NOTE — PHYSICAL THERAPY INITIAL EVALUATION ADULT - ADDITIONAL COMMENTS
as per pt. pt. lives with brother in the private house and has 5 steps to enter (+) rail, (-) DME;  unknown if the family is available to assist pt. upon D/C home,

## 2018-10-22 NOTE — OCCUPATIONAL THERAPY INITIAL EVALUATION ADULT - ASSISTIVE DEVICE FOR TOILET TRANSFER, REHAB EVAL
rolling walker/right grab bar; recommend use of commode over toilet upon discharge to increase height of toilet and provide armrests

## 2018-10-22 NOTE — PHYSICAL THERAPY INITIAL EVALUATION ADULT - DIAGNOSIS, PT EVAL
Impaired Functional Mobility due to small posterior infarct left lentiform nucleus and periventricular white matter

## 2018-10-23 LAB
ANION GAP SERPL CALC-SCNC: 18 MMOL/L — HIGH (ref 5–17)
BUN SERPL-MCNC: 36 MG/DL — HIGH (ref 8–20)
CALCIUM SERPL-MCNC: 10.1 MG/DL — SIGNIFICANT CHANGE UP (ref 8.6–10.2)
CHLORIDE SERPL-SCNC: 106 MMOL/L — SIGNIFICANT CHANGE UP (ref 98–107)
CO2 SERPL-SCNC: 19 MMOL/L — LOW (ref 22–29)
CREAT SERPL-MCNC: 1.68 MG/DL — HIGH (ref 0.5–1.3)
CULTURE RESULTS: NO GROWTH — SIGNIFICANT CHANGE UP
GLUCOSE BLDC GLUCOMTR-MCNC: 230 MG/DL — HIGH (ref 70–99)
GLUCOSE BLDC GLUCOMTR-MCNC: 254 MG/DL — HIGH (ref 70–99)
GLUCOSE BLDC GLUCOMTR-MCNC: 283 MG/DL — HIGH (ref 70–99)
GLUCOSE BLDC GLUCOMTR-MCNC: 363 MG/DL — HIGH (ref 70–99)
GLUCOSE SERPL-MCNC: 366 MG/DL — HIGH (ref 70–115)
HCT VFR BLD CALC: 47.8 % — HIGH (ref 37–47)
HGB BLD-MCNC: 15.8 G/DL — SIGNIFICANT CHANGE UP (ref 12–16)
MAGNESIUM SERPL-MCNC: 2.1 MG/DL — SIGNIFICANT CHANGE UP (ref 1.6–2.6)
MCHC RBC-ENTMCNC: 31 PG — SIGNIFICANT CHANGE UP (ref 27–31)
MCHC RBC-ENTMCNC: 33.1 G/DL — SIGNIFICANT CHANGE UP (ref 32–36)
MCV RBC AUTO: 93.7 FL — SIGNIFICANT CHANGE UP (ref 81–99)
PHOSPHATE SERPL-MCNC: 2.5 MG/DL — SIGNIFICANT CHANGE UP (ref 2.4–4.7)
PLATELET # BLD AUTO: 249 K/UL — SIGNIFICANT CHANGE UP (ref 150–400)
POTASSIUM SERPL-MCNC: 4.6 MMOL/L — SIGNIFICANT CHANGE UP (ref 3.5–5.3)
POTASSIUM SERPL-SCNC: 4.6 MMOL/L — SIGNIFICANT CHANGE UP (ref 3.5–5.3)
RBC # BLD: 5.1 M/UL — SIGNIFICANT CHANGE UP (ref 4.4–5.2)
RBC # FLD: 12.8 % — SIGNIFICANT CHANGE UP (ref 11–15.6)
SODIUM SERPL-SCNC: 143 MMOL/L — SIGNIFICANT CHANGE UP (ref 135–145)
SPECIMEN SOURCE: SIGNIFICANT CHANGE UP
WBC # BLD: 11.5 K/UL — HIGH (ref 4.8–10.8)
WBC # FLD AUTO: 11.5 K/UL — HIGH (ref 4.8–10.8)

## 2018-10-23 PROCEDURE — 93010 ELECTROCARDIOGRAM REPORT: CPT

## 2018-10-23 PROCEDURE — 99232 SBSQ HOSP IP/OBS MODERATE 35: CPT

## 2018-10-23 PROCEDURE — 99222 1ST HOSP IP/OBS MODERATE 55: CPT | Mod: GC

## 2018-10-23 PROCEDURE — 99233 SBSQ HOSP IP/OBS HIGH 50: CPT

## 2018-10-23 RX ORDER — INFLUENZA VIRUS VACCINE 15; 15; 15; 15 UG/.5ML; UG/.5ML; UG/.5ML; UG/.5ML
0.5 SUSPENSION INTRAMUSCULAR ONCE
Qty: 0 | Refills: 0 | Status: COMPLETED | OUTPATIENT
Start: 2018-10-23 | End: 2018-10-23

## 2018-10-23 RX ORDER — INSULIN GLARGINE 100 [IU]/ML
20 INJECTION, SOLUTION SUBCUTANEOUS AT BEDTIME
Qty: 0 | Refills: 0 | Status: DISCONTINUED | OUTPATIENT
Start: 2018-10-23 | End: 2018-10-26

## 2018-10-23 RX ORDER — LOSARTAN POTASSIUM 100 MG/1
50 TABLET, FILM COATED ORAL DAILY
Qty: 0 | Refills: 0 | Status: DISCONTINUED | OUTPATIENT
Start: 2018-10-23 | End: 2018-10-26

## 2018-10-23 RX ORDER — DEXTROSE 50 % IN WATER 50 %
25 SYRINGE (ML) INTRAVENOUS ONCE
Qty: 0 | Refills: 0 | Status: DISCONTINUED | OUTPATIENT
Start: 2018-10-23 | End: 2018-10-26

## 2018-10-23 RX ORDER — DEXTROSE 50 % IN WATER 50 %
15 SYRINGE (ML) INTRAVENOUS ONCE
Qty: 0 | Refills: 0 | Status: DISCONTINUED | OUTPATIENT
Start: 2018-10-23 | End: 2018-10-26

## 2018-10-23 RX ORDER — INSULIN LISPRO 100/ML
VIAL (ML) SUBCUTANEOUS
Qty: 0 | Refills: 0 | Status: DISCONTINUED | OUTPATIENT
Start: 2018-10-23 | End: 2018-10-26

## 2018-10-23 RX ORDER — INSULIN LISPRO 100/ML
VIAL (ML) SUBCUTANEOUS AT BEDTIME
Qty: 0 | Refills: 0 | Status: DISCONTINUED | OUTPATIENT
Start: 2018-10-23 | End: 2018-10-26

## 2018-10-23 RX ORDER — DEXTROSE 50 % IN WATER 50 %
12.5 SYRINGE (ML) INTRAVENOUS ONCE
Qty: 0 | Refills: 0 | Status: DISCONTINUED | OUTPATIENT
Start: 2018-10-23 | End: 2018-10-26

## 2018-10-23 RX ORDER — INSULIN LISPRO 100/ML
4 VIAL (ML) SUBCUTANEOUS
Qty: 0 | Refills: 0 | Status: DISCONTINUED | OUTPATIENT
Start: 2018-10-23 | End: 2018-10-26

## 2018-10-23 RX ORDER — AMIODARONE HYDROCHLORIDE 400 MG/1
400 TABLET ORAL EVERY 8 HOURS
Qty: 0 | Refills: 0 | Status: COMPLETED | OUTPATIENT
Start: 2018-10-23 | End: 2018-10-26

## 2018-10-23 RX ADMIN — ATORVASTATIN CALCIUM 40 MILLIGRAM(S): 80 TABLET, FILM COATED ORAL at 21:12

## 2018-10-23 RX ADMIN — Medication 6: at 17:09

## 2018-10-23 RX ADMIN — AMIODARONE HYDROCHLORIDE 400 MILLIGRAM(S): 400 TABLET ORAL at 21:12

## 2018-10-23 RX ADMIN — Medication 25 MILLIGRAM(S): at 06:13

## 2018-10-23 RX ADMIN — APIXABAN 2.5 MILLIGRAM(S): 2.5 TABLET, FILM COATED ORAL at 08:02

## 2018-10-23 RX ADMIN — INSULIN GLARGINE 20 UNIT(S): 100 INJECTION, SOLUTION SUBCUTANEOUS at 21:37

## 2018-10-23 RX ADMIN — APIXABAN 2.5 MILLIGRAM(S): 2.5 TABLET, FILM COATED ORAL at 21:12

## 2018-10-23 RX ADMIN — Medication 5: at 10:52

## 2018-10-23 RX ADMIN — Medication 25 MILLIGRAM(S): at 21:12

## 2018-10-23 RX ADMIN — Medication 81 MILLIGRAM(S): at 12:36

## 2018-10-23 RX ADMIN — Medication 4 UNIT(S): at 17:09

## 2018-10-23 RX ADMIN — AMIODARONE HYDROCHLORIDE 400 MILLIGRAM(S): 400 TABLET ORAL at 13:07

## 2018-10-23 RX ADMIN — Medication 25 MILLIGRAM(S): at 13:32

## 2018-10-23 RX ADMIN — Medication 2: at 21:12

## 2018-10-23 RX ADMIN — LOSARTAN POTASSIUM 50 MILLIGRAM(S): 100 TABLET, FILM COATED ORAL at 12:32

## 2018-10-23 RX ADMIN — Medication 2: at 06:17

## 2018-10-23 RX ADMIN — Medication 50 MILLIGRAM(S): at 12:36

## 2018-10-23 NOTE — PROGRESS NOTE ADULT - ASSESSMENT
Pt's CVA is most likely due to afib  She is anticoagulated, dosing adjusted due to elevated Cr and age.  PAF, no symptoms  We can start Amiodarone for rhythm control. Obtain EKG 3 hours post first 3 doses.  400 mg tid for 3 days, then bid for 3 days, then 200 mg daily  I will speak to Sia Herrera NP on 4T for sign out of EKG monitoring.

## 2018-10-23 NOTE — PROGRESS NOTE ADULT - PROBLEM SELECTOR PLAN 4
Monitor bgm Monitor bgm. 10/23 She is hyperglycemic, add lantu and humalog pre-meal bolus as well as scal coverage.

## 2018-10-23 NOTE — PROGRESS NOTE ADULT - PROBLEM SELECTOR PLAN 2
Add hydralazine prn for markedly elevated BP. On 10/22 BP slightly improved but still elevated. Add hydralazine prn for markedly elevated BP. On 10/22 BP slightly improved but still elevated. 10/23 She remains hypertensive, resume losartan.

## 2018-10-23 NOTE — PROGRESS NOTE ADULT - ASSESSMENT
87 year old female acute cva on mri, Meniere disease, atrial fibrillation, htn, hyperlipidemia. MRI shows scattered chronic ischemic changes as well as acute changes in lentiform nucleus.  On 10/22 Patient continues to act confused and fighting to get out of bed requiring constant observation. 87 year old female acute cva on mri, Meniere disease, atrial fibrillation, htn, hyperlipidemia. MRI shows scattered chronic ischemic changes as well as acute changes in lentiform nucleus.  On 10/22 Patient continues to act confused and fighting to get out of bed requiring constant observation.  On 10/23 She is acting more appropriately but she continues to have episodes of atrial fibrillation with rapid ventricular response. I spoke with DR Soto from cardiology who will follow up. 87 year old female acute cva on mri, Meniere disease, atrial fibrillation, htn, hyperlipidemia. MRI shows scattered chronic ischemic changes as well as acute changes in lentiform nucleus.  On 10/22 Patient continues to act confused and fighting to get out of bed requiring constant observation.  On 10/23 She is acting more appropriately but she continues to have episodes of atrial fibrillation with rapid ventricular response. I spoke with DR Soto from cardiology who will follow up. She remains hypertensive, resume losartan.

## 2018-10-23 NOTE — PROGRESS NOTE ADULT - PROBLEM SELECTOR PLAN 6
Patient is on bed rest until pt eval, but on 10/21 she continues to thrash about making her very high risk for fall. Patient is on bed rest until pt eval, but on 10/21 she continues to thrash about making her very high risk for fall. On 10/23 She is sitting in a chair better behaved.

## 2018-10-23 NOTE — CONSULT NOTE ADULT - SUBJECTIVE AND OBJECTIVE BOX
86 yo female was admitted on 10-20  Patient is a 87y old  Female who presents with a chief complaint of lightheadedness, difficulty speaking, weakness (22 Oct 2018 14:54)    HPI:  87y/oF primarily [place] speaking, hx CAD s/p CABG, HTN, DM presenting with intermittent lightheadedness associated with episodes of difficulty speaking and progressive leg weakness for the past week.  Pt has just returned from [place] on 10/16 when she began to experience these symptoms.  Her children did not take her to hospital initially because they believed she might have been jet-lagged from flying or that it could have been an adverse effect from the valium that she uses for muscles spasms.  In ED, pt had CT head that showed moderate microvascular changes and MRI brain showed acute infarcts left posterior periventricular white matter, and left external capsule. Stroke was suspected to be of cardioembolic origin with EKG finding of Afib with no prior hx of afib as per family. CHADS-VASC: 7. She was started on low dose eliquis and recommended no antiplatelet therapy due to her increased risk of cerebral bleed with extensive MVD seen on MRI and her age as per neuro. She was recommended pureed diet with thin by speech and swallow eval. She was placed on constant observation for confusion and risk of fall as she was attempting to get out of bed.       Imaging showed:  HEAD CT (10/20) - Moderate chronic microvascular changes without evidence of an acute transcortical infarction or hemorrhage.   MR Head: (10/20) : fairly extensive chronic ischemic changes throughout the white matter of both hemispheres as well as basal ganglia and posterior fossa. small acute infarct left posterior lentiform nucleus and periventricular white matter..    Carotid DOppler (10/21): No hemodynamically significant stenosis by peak systolic velocity measurements.    REVIEW OF SYSTEMS  Constitutional - No fever, No weight loss, No fatigue  HEENT - No eye pain, No visual disturbances, No difficulty hearing, No tinnitus, No vertigo, No neck pain  Respiratory - No cough, No wheezing, No shortness of breath  Cardiovascular - No chest pain, No palpitations  Gastrointestinal - No abdominal pain, No nausea, No vomiting, No diarrhea, No constipation  Genitourinary - No dysuria, No frequency, No hematuria, No incontinence  Neurological - No headaches, No memory loss, No loss of strength, No numbness, No tremors  Skin - No itching, No rashes, No lesions   Endocrine - No temperature intolerance  Musculoskeletal - No joint pain, No joint swelling, No muscle pain  Psychiatric - No depression, No anxiety    VITALS  T(C): 36.9 (10-23-18 @ 04:04), Max: 37 (10-22-18 @ 16:29)  HR: 78 (10-23-18 @ 07:58) (78 - 160)  BP: 169/81 (10-23-18 @ 07:58) (140/98 - 183/91)  RR: 21 (10-23-18 @ 07:58) (17 - 23)  SpO2: 96% (10-23-18 @ 07:58) (94% - 99%)  Wt(kg): --    PAST MEDICAL & SURGICAL HISTORY  Gout  Meniere disease  CAD (coronary artery disease)  HTN (hypertension)  DM (diabetes mellitus)  S/P CABG (coronary artery bypass graft)  No significant past surgical history      SOCIAL HISTORY  Smoking - Denied  EtOH - Denied   Drugs - Denied    FUNCTIONAL HISTORY  Lives   Independent    CURRENT FUNCTIONAL STATUS 10/22  Bed mobility: min assist  Transfers: mod assist  Gait: mod assist 35 feet RW  ADLs: Min assist with UBD, grooming.  Mod assist LBD, dependent for toileting    FAMILY HISTORY   Family history of diabetes mellitus (Father, Mother)      RECENT LABS/IMAGING    10-22    143  |  109<H>  |  39.0<H>  ----------------------------<  325<H>  4.3   |  20.0<L>  |  1.78<H>    Ca    10.0      22 Oct 2018 18:26  Phos  2.6     10-22  Mg     2.0     10-22          ALLERGIES  No Known Allergies      MEDICATIONS   allopurinol 50 milliGRAM(s) Oral daily  apixaban 2.5 milliGRAM(s) Oral every 12 hours  aspirin enteric coated 81 milliGRAM(s) Oral daily  atorvastatin 40 milliGRAM(s) Oral at bedtime  dextrose 40% Gel 15 Gram(s) Oral once PRN  dextrose 5%. 1000 milliLiter(s) IV Continuous <Continuous>  dextrose 50% Injectable 12.5 Gram(s) IV Push once  dextrose 50% Injectable 25 Gram(s) IV Push once  dextrose 50% Injectable 25 Gram(s) IV Push once  diazepam    Tablet 1 milliGRAM(s) Oral daily PRN  glucagon  Injectable 1 milliGRAM(s) IntraMuscular once PRN  haloperidol    Injectable 3 milliGRAM(s) IntraMuscular every 6 hours PRN  hydrALAZINE Injectable 10 milliGRAM(s) IV Push every 6 hours PRN  insulin lispro (HumaLOG) corrective regimen sliding scale   SubCutaneous three times a day before meals  insulin lispro (HumaLOG) corrective regimen sliding scale   SubCutaneous at bedtime  metoprolol tartrate 25 milliGRAM(s) Oral three times a day  metoprolol tartrate Injectable 5 milliGRAM(s) IV Push every 6 hours PRN    < from: TTE Echo Complete w/Doppler (10.21.18 @ 12:47) >  EXAM:  ECHO TRANSTHORACIC COMP W DOPP      PROCEDURE DATE:  Oct 21 2018   Summary:   1. Hyperdynamic global left ventricular systolic function.   2. Left ventricular ejection fraction, by visual estimation, is >75%.   3. The mitral in-flow pattern reveals no discernable A-wave, therefore   no comment on diastolic function can be made.   4. There is no left ventricular hypertrophy.   5. Thickening and calcification of the anterior and posterior mitral   valve leaflets.   6. Mild mitral valve regurgitation.   7. Moderate aortic valve stenosis.   8. Peak transaortic gradient is 38.4 mmHg, mean transaortic gradient   equals 25.0 mmHg, the calculated aortic valve area equals 1.01 cm² by the   continuity equation consistent with moderate aortic stenosis.   9. The Dimesionless Index value is 0.35 which is consistent with   moderate aortic stenosis.  10. Mild aortic regurgitation.  11. Mild tricuspid regurgitation.  12. Trace pulmonic valve regurgitation.  13. Trivial pericardial effusion.    [id number] Rigo Tijerina MD, Electronically signed on 10/21/2018 at   2:44:41 PM    *** Final ***    LESLIE TIJERINA MD  This document has been electronically signed. Oct 21 2018 12:47PM     < end of copied text > 88 yo female was admitted on 10-20  Patient is a 87y old  Female who presents with a chief complaint of lightheadedness, difficulty speaking, weakness (22 Oct 2018 14:54)    HPI:  87y/oF primarily [place] speaking, hx CAD s/p CABG, HTN, DM presenting with intermittent lightheadedness associated with episodes of difficulty speaking and progressive leg weakness for the past week.  Pt has just returned from [place] on 10/16 when she began to experience these symptoms.  Her children did not take her to hospital initially because they believed she might have been jet-lagged from flying or that it could have been an adverse effect from the valium that she uses for muscles spasms.  In ED, pt had CT head that showed moderate microvascular changes and MRI brain showed acute infarcts left posterior periventricular white matter, and left external capsule. Stroke was suspected to be of cardioembolic origin with EKG finding of Afib with no prior hx of afib as per family. CHADS-VASC: 7. She was started on low dose eliquis and recommended no antiplatelet therapy due to her increased risk of cerebral bleed with extensive MVD seen on MRI and her age as per neuro. She was recommended pureed diet with thin by speech and swallow eval. She was placed on constant observation for confusion and risk of fall as she was attempting to get out of bed.   Patient was seen today morning and states that she is very tired. On later exam, she is more awake/ alert. She denies any complaints. Son present at bedside      Imaging showed:  HEAD CT (10/20) - Moderate chronic microvascular changes without evidence of an acute transcortical infarction or hemorrhage.   MR Head: (10/20) : fairly extensive chronic ischemic changes throughout the white matter of both hemispheres as well as basal ganglia and posterior fossa. small acute infarct left posterior lentiform nucleus and periventricular white matter..    Carotid DOppler (10/21): No hemodynamically significant stenosis by peak systolic velocity measurements.    REVIEW OF SYSTEMS  Constitutional - No fever, No weight loss, No fatigue  HEENT - No eye pain,   Respiratory - No cough, No wheezing, No shortness of breath  Cardiovascular - No chest pain, No palpitations  Gastrointestinal - No abdominal pain, No nausea, No vomiting, No diarrhea, No constipation  Genitourinary - No dysuria, No frequency, No hematuria, No incontinence  Neurological - No headaches,   Musculoskeletal - No joint pain, No joint swelling, No muscle pain  Psychiatric - No depression, No anxiety    VITALS  T(C): 36.9 (10-23-18 @ 04:04), Max: 37 (10-22-18 @ 16:29)  HR: 78 (10-23-18 @ 07:58) (78 - 160)  BP: 169/81 (10-23-18 @ 07:58) (140/98 - 183/91)  RR: 21 (10-23-18 @ 07:58) (17 - 23)  SpO2: 96% (10-23-18 @ 07:58) (94% - 99%)  Wt(kg): --    PAST MEDICAL & SURGICAL HISTORY  Gout  Meniere disease  CAD (coronary artery disease)  HTN (hypertension)  DM (diabetes mellitus)  S/P CABG (coronary artery bypass graft)  No significant past surgical history      SOCIAL HISTORY  Smoking - Denied  EtOH - Denied   Drugs - Denied    FUNCTIONAL HISTORY  [place] the summer and early fall during which she lives in [facility]) in a private home 2nd floor unit with 12-13 steps (1 railing) to her floor. She has a bathroom with shower seat.  prior to this she was independent with ambulation/adls as per son.    CURRENT FUNCTIONAL STATUS 10/22  Bed mobility: min assist  Transfers: mod assist  Gait: mod assist 35 feet RW  ADLs: Min assist with UBD, grooming.  Mod assist LBD, dependent for toileting    FAMILY HISTORY   Family history of diabetes mellitus (Father, Mother)      RECENT LABS/IMAGING    10-22    143  |  109<H>  |  39.0<H>  ----------------------------<  325<H>  4.3   |  20.0<L>  |  1.78<H>    Ca    10.0      22 Oct 2018 18:26  Phos  2.6     10-22  Mg     2.0     10-22          ALLERGIES  No Known Allergies      MEDICATIONS   allopurinol 50 milliGRAM(s) Oral daily  apixaban 2.5 milliGRAM(s) Oral every 12 hours  aspirin enteric coated 81 milliGRAM(s) Oral daily  atorvastatin 40 milliGRAM(s) Oral at bedtime  dextrose 40% Gel 15 Gram(s) Oral once PRN  dextrose 5%. 1000 milliLiter(s) IV Continuous <Continuous>  dextrose 50% Injectable 12.5 Gram(s) IV Push once  dextrose 50% Injectable 25 Gram(s) IV Push once  dextrose 50% Injectable 25 Gram(s) IV Push once  diazepam    Tablet 1 milliGRAM(s) Oral daily PRN  glucagon  Injectable 1 milliGRAM(s) IntraMuscular once PRN  haloperidol    Injectable 3 milliGRAM(s) IntraMuscular every 6 hours PRN  hydrALAZINE Injectable 10 milliGRAM(s) IV Push every 6 hours PRN  insulin lispro (HumaLOG) corrective regimen sliding scale   SubCutaneous three times a day before meals  insulin lispro (HumaLOG) corrective regimen sliding scale   SubCutaneous at bedtime  metoprolol tartrate 25 milliGRAM(s) Oral three times a day  metoprolol tartrate Injectable 5 milliGRAM(s) IV Push every 6 hours PRN    < from: TTE Echo Complete w/Doppler (10.21.18 @ 12:47) >  EXAM:  ECHO TRANSTHORACIC COMP W DOPP      PROCEDURE DATE:  Oct 21 2018   Summary:   1. Hyperdynamic global left ventricular systolic function.   2. Left ventricular ejection fraction, by visual estimation, is >75%.   3. The mitral in-flow pattern reveals no discernable A-wave, therefore   no comment on diastolic function can be made.   4. There is no left ventricular hypertrophy.   5. Thickening and calcification of the anterior and posterior mitral   valve leaflets.   6. Mild mitral valve regurgitation.   7. Moderate aortic valve stenosis.   8. Peak transaortic gradient is 38.4 mmHg, mean transaortic gradient   equals 25.0 mmHg, the calculated aortic valve area equals 1.01 cm² by the   continuity equation consistent with moderate aortic stenosis.   9. The Dimesionless Index value is 0.35 which is consistent with   moderate aortic stenosis.  10. Mild aortic regurgitation.  11. Mild tricuspid regurgitation.  12. Trace pulmonic valve regurgitation.  13. Trivial pericardial effusion.    [id number] Rigo Tijerina MD, Electronically signed on 10/21/2018 at   2:44:41 PM    *** Final ***    LESLIE TIJERINA MD  This document has been electronically signed. Oct 21 2018 12:47PM     < end of copied text >    ----------------------------------------------------------------------------------------  PHYSICAL EXAM  Constitutional - NAD, Comfortable  HEENT - NCAT, EOMI  Neck - Supple, No limited ROM  Chest - Breathing comfortably, No wheezing  Cardiovascular - S1S2   Abdomen - Soft   Extremities - No C/C/E, No calf tenderness   Neurologic Exam -                    Cognitive - Awake, Alert, AAO to self, and place not to year     Communication - mild dysarthria     Cranial Nerves - CN 2-12 intact     Motor - moving all extremities, grossly > 3/5 on exam of upper and lower extrem      Reflexes - DTR Intact, No primitive reflexive     OculoVestibular - No saccades, No nystagmus,     Psychiatric - Mood stable, Affect WNL    ------------------------------------------------------------------------------------------------  ASSESSMENT/PLAN  87yFemale with functional deficits after acute left ischemic CVA with new onset a. fib.  -CVA-  likley cardioembolic, cotninue statin, consider discontinuation of haldol prn agitation.  -afib - rate control, AC with eliquis  -Pain - Tylenol prn,  ? use of valium, according to son, patient has been using medication since fall in july.  -DVT PPX - SCDs, eliquis  -Rehab -  Recommend ACUTE inpatient rehabilitation for the functional deficits consisting of 3 hours of therapy/day & 24 hour RN/daily PMR physician for comorbid medical management. Will continue to follow for ongoing rehab needs and recommendations. Patient will be able to tolerate 3 hours a day.

## 2018-10-23 NOTE — PROGRESS NOTE ADULT - SUBJECTIVE AND OBJECTIVE BOX
THEO BOWEN     Chief Complaint: Patient is a 87y old  Female who presents with a chief complaint of lightheadedness, difficulty speaking, leg weakness weakness (23 Oct 2018 09:33)      PAST MEDICAL & SURGICAL HISTORY:  Gout  Meniere disease  CAD (coronary artery disease)  HTN (hypertension)  DM (diabetes mellitus)  S/P CABG (coronary artery bypass graft)      HPI/OVERNIGHT EVENTS: Patient sitting in chair. She spoke to Maltese . Her speech is fluent but semantically inappropriate.    MEDICATIONS  (STANDING):  allopurinol 50 milliGRAM(s) Oral daily  apixaban 2.5 milliGRAM(s) Oral every 12 hours  aspirin enteric coated 81 milliGRAM(s) Oral daily  atorvastatin 40 milliGRAM(s) Oral at bedtime  dextrose 5%. 1000 milliLiter(s) (50 mL/Hr) IV Continuous <Continuous>  dextrose 50% Injectable 12.5 Gram(s) IV Push once  dextrose 50% Injectable 25 Gram(s) IV Push once  dextrose 50% Injectable 25 Gram(s) IV Push once  insulin glargine Injectable (LANTUS) 20 Unit(s) SubCutaneous at bedtime  insulin lispro (HumaLOG) corrective regimen sliding scale   SubCutaneous three times a day before meals  insulin lispro (HumaLOG) corrective regimen sliding scale   SubCutaneous at bedtime  insulin lispro Injectable (HumaLOG) 4 Unit(s) SubCutaneous three times a day before meals  metoprolol tartrate 25 milliGRAM(s) Oral three times a day      Vital Signs Last 24 Hrs  T(C): 36.9 (23 Oct 2018 04:04), Max: 37 (22 Oct 2018 16:29)  T(F): 98.4 (23 Oct 2018 04:04), Max: 98.6 (22 Oct 2018 16:29)  HR: 78 (23 Oct 2018 07:58) (78 - 160)  BP: 169/81 (23 Oct 2018 07:58) (140/98 - 183/91)  BP(mean): 103 (23 Oct 2018 07:58) (87 - 118)  RR: 21 (23 Oct 2018 07:58) (17 - 23)  SpO2: 96% (23 Oct 2018 07:58) (94% - 99%)    PHYSICAL EXAM:  Constitutional:  elderly female in chair.  HEENT: PERRLA, EOMI, Normal Hearing, MMM  Neck: No LAD, No JVD  Back: Normal spine flexure, No CVA tenderness  Respiratory: CTAB Cardiovascular: S1 and S2, RRR, no M/G/R  Gastrointestinal: BS+, soft, NT/ND  Extremities: No peripheral edema  Vascular: 2+ peripheral pulses  Neurological: A/O x 3, Moves 4 extremities with intent    CAPILLARY BLOOD GLUCOSE    LABS:                        15.8   11.5  )-----------( 249      ( 23 Oct 2018 10:31 )             47.8     10-23    143  |  106  |  36.0<H>  ----------------------------<  366<H>  4.6   |  19.0<L>  |  1.68<H>    Ca    10.1      23 Oct 2018 10:31  Phos  2.5     10-23  Mg     2.1     10-23            RADIOLOGY & ADDITIONAL TESTS:

## 2018-10-23 NOTE — CONSULT NOTE ADULT - ATTENDING COMMENTS
Agree with Dr. Lozano.  Rehab - Recommend ACUTE inpatient rehabilitation for the functional deficits consisting of 3 hours of therapy/day & 24 hour RN/daily PMR physician for comorbid medical management. Will continue to follow for ongoing rehab needs and recommendations. Patient will be able to tolerate 3 hours a day.    Continue bedside therapy as well as OOB throughout the day with mobilization throughout the day with staff to maintain cardiopulmonary function and prevention of secondary complications related to debility.
87 year old woman with a history of CABG 4 years ago with post operative atrial fibrillation then treated with anticoagulation that was stopped after several months now with acute CVA and CHADSVasc score of 7.  The neurology evaluation agrees that the risk of further stroke greater than the risk of intracranial bleeding as the strokes are small.  The patient received Lovenox but agree that low dose Eliquis a better agent.  Will continue atorvastatin as an outpatient.  Additionally, would obtain an echocardiogram to assess the heart murmur and rule other structural heart disease and keep BP up as acute stroke.  Physical therapy to evaluate as well when stable.  Agree to monitor renal function.

## 2018-10-23 NOTE — PROGRESS NOTE ADULT - PROBLEM SELECTOR PLAN 3
Cardiology input appreciated, Full dose lovenox has been discontinued and now the patient is on Eliquis and aspirin. consider discontinuing aspirin. Cardiology input appreciated, Full dose lovenox has been discontinued and now the patient is on Eliquis and aspirin. consider discontinuing aspirin. 10/23 She continues to have episodes of rapid ventricular response, cardiology re-consulted.

## 2018-10-23 NOTE — CONSULT NOTE ADULT - REASON FOR ADMISSION
lightheadedness, difficulty speaking, leg weakness weakness
lightheadedness, difficulty speaking, weakness
lightheadedness, difficulty speaking, weakness

## 2018-10-23 NOTE — PROGRESS NOTE ADULT - PROBLEM SELECTOR PLAN 1
Patient is markedly confused on 10/21 requiring constant observation. Continue aspirin, lipitor and lovenox full dose. On 10/22 She has been converted to low dose eliquis. Neurology input appreciated, consider discontinue aspirin and only continue eliquis for anticoagulation. Patient is markedly confused on 10/21 requiring constant observation. Continue aspirin, lipitor and lovenox full dose. On 10/22 She has been converted to low dose eliquis. Neurology input appreciated, consider discontinue aspirin and only continue eliquis for anticoagulation. 10/23 Patient is acting more appropriately.

## 2018-10-23 NOTE — PROGRESS NOTE ADULT - SUBJECTIVE AND OBJECTIVE BOX
CHIEF COMPLAINT:Patient is a 87y old  Female who presents with a chief complaint of lightheadedness, difficulty speaking, weakness (23 Oct 2018 11:27)  pt with expressive aphasia.     MRI:  IMPRESSION:      1)  fairly extensive chronic ischemic changes throughout the white matter   of both hemispheres as well as basal ganglia and posterior fossa..  2)  small acute infarct left posterior lentiform nucleus and   periventricular white matter..       No acute changes. PAF on monitor.  EKG with RBBB, LAFB.     Allergies  No Known Allergies  	  MEDICATIONS:  hydrALAZINE Injectable 10 milliGRAM(s) IV Push every 6 hours PRN  losartan 50 milliGRAM(s) Oral daily  metoprolol tartrate 25 milliGRAM(s) Oral three times a day  metoprolol tartrate Injectable 5 milliGRAM(s) IV Push every 6 hours PRN  diazepam    Tablet 1 milliGRAM(s) Oral daily PRN  haloperidol    Injectable 3 milliGRAM(s) IntraMuscular every 6 hours PRN  allopurinol 50 milliGRAM(s) Oral daily  atorvastatin 40 milliGRAM(s) Oral at bedtime  dextrose 40% Gel 15 Gram(s) Oral once PRN  dextrose 50% Injectable 12.5 Gram(s) IV Push once  dextrose 50% Injectable 25 Gram(s) IV Push once  dextrose 50% Injectable 25 Gram(s) IV Push once  glucagon  Injectable 1 milliGRAM(s) IntraMuscular once PRN  insulin glargine Injectable (LANTUS) 20 Unit(s) SubCutaneous at bedtime  insulin lispro (HumaLOG) corrective regimen sliding scale   SubCutaneous three times a day before meals  insulin lispro (HumaLOG) corrective regimen sliding scale   SubCutaneous at bedtime  insulin lispro Injectable (HumaLOG) 4 Unit(s) SubCutaneous three times a day before meals  apixaban 2.5 milliGRAM(s) Oral every 12 hours  aspirin enteric coated 81 milliGRAM(s) Oral daily  dextrose 5%. 1000 milliLiter(s) IV Continuous <Continuous>    PHYSICAL EXAM:  T(C): 36.9 (10-23-18 @ 04:04), Max: 37 (10-22-18 @ 16:29)  HR: 78 (10-23-18 @ 07:58) (78 - 160)  BP: 169/81 (10-23-18 @ 07:58) (140/98 - 183/91)  RR: 21 (10-23-18 @ 07:58) (17 - 23)  SpO2: 96% (10-23-18 @ 07:58) (94% - 99%)  Appearance: Normal	  HEENT:   NC/AT  Eye: Pink Conjunctiva  Lungs: CTA B/L  CVS: RRR, Normal S1 and S2, No Edema  Pulses: Normal distal pulses.    LABS:	 	                          15.8   11.5  )-----------( 249      ( 23 Oct 2018 10:31 )             47.8     10-23    143  |  106  |  36.0<H>  ----------------------------<  366<H>  4.6   |  19.0<L>  |  1.68<H>    Ca    10.1      23 Oct 2018 10:31  Phos  2.5     10-23  Mg     2.1     10-23    TTE:  Summary:   1. Hyperdynamic global left ventricular systolic function.   2. Left ventricular ejection fraction, by visual estimation, is >75%.   3. The mitral in-flow pattern reveals no discernable A-wave, therefore   no comment on diastolic function can be made.   4. There is no left ventricular hypertrophy.   5. Thickening and calcification of the anterior and posterior mitral   valve leaflets.   6. Mild mitral valve regurgitation.   7. Moderate aortic valve stenosis.   8. Peak transaortic gradient is 38.4 mmHg, mean transaortic gradient   equals 25.0 mmHg, the calculated aortic valve area equals 1.01 cm² by the   continuity equation consistent with moderate aortic stenosis.   9. The Dimesionless Index value is 0.35 which is consistent with   moderate aortic stenosis.  10. Mild aortic regurgitation.  11. Mild tricuspid regurgitation.  12. Trace pulmonic valve regurgitation.  13. Trivial pericardial effusion.    [id number] Rigo Tijerina MD, Electronically signed on 10/21/2018 at   2:44:41 PM      ASSESSMENT/PLAN:

## 2018-10-24 LAB
GLUCOSE BLDC GLUCOMTR-MCNC: 166 MG/DL — HIGH (ref 70–99)
GLUCOSE BLDC GLUCOMTR-MCNC: 206 MG/DL — HIGH (ref 70–99)
GLUCOSE BLDC GLUCOMTR-MCNC: 230 MG/DL — HIGH (ref 70–99)
GLUCOSE BLDC GLUCOMTR-MCNC: 257 MG/DL — HIGH (ref 70–99)

## 2018-10-24 PROCEDURE — 99232 SBSQ HOSP IP/OBS MODERATE 35: CPT

## 2018-10-24 PROCEDURE — 93010 ELECTROCARDIOGRAM REPORT: CPT

## 2018-10-24 RX ORDER — APIXABAN 2.5 MG/1
1 TABLET, FILM COATED ORAL
Qty: 0 | Refills: 0 | DISCHARGE
Start: 2018-10-24

## 2018-10-24 RX ORDER — METOPROLOL TARTRATE 50 MG
1 TABLET ORAL
Qty: 0 | Refills: 0 | DISCHARGE
Start: 2018-10-24

## 2018-10-24 RX ORDER — METOPROLOL TARTRATE 50 MG
1 TABLET ORAL
Qty: 0 | Refills: 0 | COMMUNITY

## 2018-10-24 RX ORDER — AMIODARONE HYDROCHLORIDE 400 MG/1
2 TABLET ORAL
Qty: 0 | Refills: 0 | COMMUNITY
Start: 2018-10-24

## 2018-10-24 RX ORDER — LOSARTAN POTASSIUM 100 MG/1
1 TABLET, FILM COATED ORAL
Qty: 0 | Refills: 0 | COMMUNITY

## 2018-10-24 RX ORDER — ASPIRIN/CALCIUM CARB/MAGNESIUM 324 MG
1 TABLET ORAL
Qty: 0 | Refills: 0 | COMMUNITY

## 2018-10-24 RX ORDER — ASPIRIN/CALCIUM CARB/MAGNESIUM 324 MG
1 TABLET ORAL
Qty: 0 | Refills: 0 | DISCHARGE
Start: 2018-10-24

## 2018-10-24 RX ORDER — DIAZEPAM 5 MG
0.5 TABLET ORAL
Qty: 0 | Refills: 0 | COMMUNITY

## 2018-10-24 RX ORDER — ATORVASTATIN CALCIUM 80 MG/1
1 TABLET, FILM COATED ORAL
Qty: 0 | Refills: 0 | DISCHARGE
Start: 2018-10-24

## 2018-10-24 RX ORDER — LOSARTAN POTASSIUM 100 MG/1
1 TABLET, FILM COATED ORAL
Qty: 0 | Refills: 0 | DISCHARGE
Start: 2018-10-24

## 2018-10-24 RX ORDER — SIMVASTATIN 20 MG/1
1 TABLET, FILM COATED ORAL
Qty: 0 | Refills: 0 | COMMUNITY

## 2018-10-24 RX ADMIN — LOSARTAN POTASSIUM 50 MILLIGRAM(S): 100 TABLET, FILM COATED ORAL at 05:39

## 2018-10-24 RX ADMIN — Medication 25 MILLIGRAM(S): at 21:14

## 2018-10-24 RX ADMIN — Medication 81 MILLIGRAM(S): at 11:22

## 2018-10-24 RX ADMIN — Medication 4 UNIT(S): at 13:10

## 2018-10-24 RX ADMIN — Medication 4: at 07:59

## 2018-10-24 RX ADMIN — AMIODARONE HYDROCHLORIDE 400 MILLIGRAM(S): 400 TABLET ORAL at 21:14

## 2018-10-24 RX ADMIN — Medication 25 MILLIGRAM(S): at 05:39

## 2018-10-24 RX ADMIN — INSULIN GLARGINE 20 UNIT(S): 100 INJECTION, SOLUTION SUBCUTANEOUS at 21:14

## 2018-10-24 RX ADMIN — APIXABAN 2.5 MILLIGRAM(S): 2.5 TABLET, FILM COATED ORAL at 07:59

## 2018-10-24 RX ADMIN — Medication 4 UNIT(S): at 16:55

## 2018-10-24 RX ADMIN — AMIODARONE HYDROCHLORIDE 400 MILLIGRAM(S): 400 TABLET ORAL at 13:11

## 2018-10-24 RX ADMIN — Medication 25 MILLIGRAM(S): at 13:11

## 2018-10-24 RX ADMIN — AMIODARONE HYDROCHLORIDE 400 MILLIGRAM(S): 400 TABLET ORAL at 05:39

## 2018-10-24 RX ADMIN — Medication 6: at 16:55

## 2018-10-24 RX ADMIN — ATORVASTATIN CALCIUM 40 MILLIGRAM(S): 80 TABLET, FILM COATED ORAL at 21:15

## 2018-10-24 RX ADMIN — APIXABAN 2.5 MILLIGRAM(S): 2.5 TABLET, FILM COATED ORAL at 21:14

## 2018-10-24 RX ADMIN — Medication 50 MILLIGRAM(S): at 11:22

## 2018-10-24 RX ADMIN — Medication 4: at 13:10

## 2018-10-24 RX ADMIN — HALOPERIDOL DECANOATE 3 MILLIGRAM(S): 100 INJECTION INTRAMUSCULAR at 23:00

## 2018-10-24 RX ADMIN — Medication 4 UNIT(S): at 07:59

## 2018-10-24 NOTE — PROGRESS NOTE ADULT - SUBJECTIVE AND OBJECTIVE BOX
BETTYEANIA ANDRÉS     Chief Complaint: Patient is a 87y old  Female who presents with a chief complaint of lightheadedness, difficulty speaking, weakness (24 Oct 2018 14:22)      PAST MEDICAL & SURGICAL HISTORY:  Gout  Meniere disease  CAD (coronary artery disease)  HTN (hypertension)  DM (diabetes mellitus)  S/P CABG (coronary artery bypass graft)      HPI/OVERNIGHT EVENTS: Patient sitting in chair in no distress    MEDICATIONS  (STANDING):  allopurinol 50 milliGRAM(s) Oral daily  amiodarone    Tablet 400 milliGRAM(s) Oral every 8 hours  apixaban 2.5 milliGRAM(s) Oral every 12 hours  aspirin enteric coated 81 milliGRAM(s) Oral daily  atorvastatin 40 milliGRAM(s) Oral at bedtime  dextrose 5%. 1000 milliLiter(s) (50 mL/Hr) IV Continuous <Continuous>  dextrose 50% Injectable 12.5 Gram(s) IV Push once  dextrose 50% Injectable 25 Gram(s) IV Push once  dextrose 50% Injectable 25 Gram(s) IV Push once  influenza   Vaccine 0.5 milliLiter(s) IntraMuscular once  insulin glargine Injectable (LANTUS) 20 Unit(s) SubCutaneous at bedtime  insulin lispro (HumaLOG) corrective regimen sliding scale   SubCutaneous three times a day before meals  insulin lispro (HumaLOG) corrective regimen sliding scale   SubCutaneous at bedtime  insulin lispro Injectable (HumaLOG) 4 Unit(s) SubCutaneous three times a day before meals  losartan 50 milliGRAM(s) Oral daily  metoprolol tartrate 25 milliGRAM(s) Oral three times a day      Vital Signs Last 24 Hrs  T(C): 37.1 (24 Oct 2018 13:27), Max: 37.1 (24 Oct 2018 13:27)  T(F): 98.8 (24 Oct 2018 13:27), Max: 98.8 (24 Oct 2018 13:27)  HR: 68 (24 Oct 2018 15:42) (68 - 84)  BP: 137/62 (24 Oct 2018 15:42) (111/59 - 159/75)  BP(mean): 82 (24 Oct 2018 15:42) (76 - 91)  RR: 22 (24 Oct 2018 15:42) (20 - 23)  SpO2: 98% (24 Oct 2018 15:42) (95% - 98%)    PHYSICAL EXAM:  Constitutional: NAD, well-groomed, well-developed  HEENT: PERRLA, EOMI, Normal Hearing, MMM  Neck: No LAD, No JVD  Back: Normal spine flexure, No CVA tenderness  Respiratory: CTAB Cardiovascular: S1 and S2, RRR, no M/G/R  Gastrointestinal: BS+, soft, NT/ND  Extremities: No peripheral edema  Vascular: 2+ peripheral pulses  Neurological: A/O x 3, no focal deficits  Psychiatric: Normal mood, normal affect  Musculoskeletal: 5/5 strength b/l upper and lower extremities  Skin: No rashes    CAPILLARY BLOOD GLUCOSE    LABS:                        15.8   11.5  )-----------( 249      ( 23 Oct 2018 10:31 )             47.8     10-23    143  |  106  |  36.0<H>  ----------------------------<  366<H>  4.6   |  19.0<L>  |  1.68<H>    Ca    10.1      23 Oct 2018 10:31  Phos  2.5     10-23  Mg     2.1     10-23            RADIOLOGY & ADDITIONAL TESTS:

## 2018-10-24 NOTE — PROGRESS NOTE ADULT - ASSESSMENT
87 year old female acute cva on mri, Meniere disease, atrial fibrillation, htn, hyperlipidemia. MRI shows scattered chronic ischemic changes as well as acute changes in lentiform nucleus.  On 10/22 Patient continues to act confused and fighting to get out of bed requiring constant observation.  On 10/23 She is acting more appropriately but she continues to have episodes of atrial fibrillation with rapid ventricular response. I spoke with DR Soto from cardiology who will follow up. She remains hypertensive, resume losartan. 10/24 await placement

## 2018-10-24 NOTE — PROGRESS NOTE ADULT - PROBLEM SELECTOR PLAN 6
Patient is on bed rest until pt eval, but on 10/21 she continues to thrash about making her very high risk for fall. On 10/23 She is sitting in a chair better behaved.  Constant observation has been discontinued.

## 2018-10-24 NOTE — PROGRESS NOTE ADULT - SUBJECTIVE AND OBJECTIVE BOX
CARDIOLOGY PROGRESS NOTE   ([facility])                                                                                                        Subjective: laying in bed, son at the bed side, NAD, denied dyspnea, CP, speech clear    Vitals:  T(C): 36.9 (10-24-18 @ 17:43), Max: 37.1 (10-24-18 @ 13:27)  HR: 72 (10-24-18 @ 17:43) (68 - 79)  BP: 134/68 (10-24-18 @ 17:43) (111/59 - 157/59)  RR: 22 (10-24-18 @ 17:43) (20 - 23)  SpO2: 93% (10-24-18 @ 17:43) (93% - 98%)  Wt(kg): --  I&O's Summary    24 Oct 2018 07:01  -  24 Oct 2018 18:53  --------------------------------------------------------  IN: 720 mL / OUT: 0 mL / NET: 720 mL          PHYSICAL EXAM:  Appearance: Normal	  HEENT:   Atraumatic  Cardiovascular: Normal S1 S2, No JVD, N  Respiratory: Lungs clear to auscultation	  Gastrointestinal:  Soft, Non-tender, + BS	  Skin: No rashes, No ecchymoses, No cyanosis  Neurologic: Alert and awake, able to move extremities  Extremities: No edema          CURRENT MEDICATIONS:  amiodarone    Tablet 400 milliGRAM(s) Oral every 8 hours  hydrALAZINE Injectable 10 milliGRAM(s) IV Push every 6 hours PRN  losartan 50 milliGRAM(s) Oral daily  metoprolol tartrate 25 milliGRAM(s) Oral three times a day  metoprolol tartrate Injectable 5 milliGRAM(s) IV Push every 6 hours PRN        diazepam    Tablet 1 milliGRAM(s) Oral daily PRN  haloperidol    Injectable 3 milliGRAM(s) IntraMuscular every 6 hours PRN      allopurinol 50 milliGRAM(s) Oral daily  atorvastatin 40 milliGRAM(s) Oral at bedtime  dextrose 40% Gel 15 Gram(s) Oral once PRN  dextrose 50% Injectable 12.5 Gram(s) IV Push once  dextrose 50% Injectable 25 Gram(s) IV Push once  dextrose 50% Injectable 25 Gram(s) IV Push once  glucagon  Injectable 1 milliGRAM(s) IntraMuscular once PRN  insulin glargine Injectable (LANTUS) 20 Unit(s) SubCutaneous at bedtime  insulin lispro (HumaLOG) corrective regimen sliding scale   SubCutaneous three times a day before meals  insulin lispro (HumaLOG) corrective regimen sliding scale   SubCutaneous at bedtime  insulin lispro Injectable (HumaLOG) 4 Unit(s) SubCutaneous three times a day before meals    apixaban 2.5 milliGRAM(s) Oral every 12 hours  aspirin enteric coated 81 milliGRAM(s) Oral daily  dextrose 5%. 1000 milliLiter(s) IV Continuous <Continuous>  influenza   Vaccine 0.5 milliLiter(s) IntraMuscular once      LABS:	 	    EKG  Ventricular Rate 74 BPM    Atrial Rate 74 BPM    P-R Interval 158 ms    QRS Duration 144 ms    Q-T Interval 438 ms    QTC Calculation(Bezet) 486 ms    P Axis 13 degrees    R Axis -72 degrees    T Axis 101 degrees    Diagnosis Line Normal sinus rhythm  Possible Left atrial enlargement  Right bundle branch block  Left anterior fascicular block  *** Bifascicular block ***  Left ventricular hypertrophy with repolarization abnormality  Anterior infarct , age undetermined  Abnormal ECG    Confirmed by ALEC KING ([id number]) on 10/24/2018 4:43:40 PM                        15.8   11.5  )-----------( 249      ( 23 Oct 2018 10:31 )             47.8     10-23    143  |  106  |  36.0<H>  ----------------------------<  366<H>  4.6   |  19.0<L>  |  1.68<H>    Ca    10.1      23 Oct 2018 10:31  Phos  2.5     10-23  Mg     2.1     10-23      proBNP:   Lipid Profile: Date: 10-21 @ 08:25  Total cholesterol 240; Direct LDL: 155; HDL: 41; Triglycerides:219    HgA1c:   TSH:

## 2018-10-24 NOTE — PROGRESS NOTE ADULT - ASSESSMENT
87F hx CABG 4 years ago with post operative atrial fibrillation then treated with anticoagulation that was stopped after several months now presents with difficulty speaking and leg weakness, found to have acute CVA and new onset A-fib.  CHADSVasc score of 7. Pt is started on low dose Eliquis.    Paroxysmal atrial fibrillation, Periods of RVR  Maintaining SR  ECG reveiwed  Amio started, cont  Cont BB  On Eliquis    Moderate AS  Echo:  EF >75. Mod AS.    Cerebrovascular accident (CVA), unspecified mechanism  Cont ASA, Statin    CAD (coronary artery disease)  ASA/BB/statin    Hypertension  Monitor BP    Unsteady gait  Continue PT      CARDIO MEDS  amiodarone    Tablet 400 milliGRAM(s) Oral every 8 hours  hydrALAZINE Injectable 10 milliGRAM(s) IV Push every 6 hours PRN  losartan 50 milliGRAM(s) Oral daily  metoprolol tartrate 25 milliGRAM(s) Oral three times a day  metoprolol tartrate Injectable 5 milliGRAM(s) IV Push every 6 hours PRN  atorvastatin 40 milliGRAM(s) Oral at bedtime  apixaban 2.5 milliGRAM(s) Oral every 12 hours  aspirin enteric coated 81 milliGRAM(s) Oral daily

## 2018-10-24 NOTE — CHART NOTE - NSCHARTNOTEFT_GEN_A_CORE
Called by RN after pt getting agitated and aggressive.   Pt with baseline periods of confusion, with hx of CVA.  VSS.  Constant observation ordered considering pt safety.  Continue to monitor and reassess prn.

## 2018-10-24 NOTE — PROGRESS NOTE ADULT - PROBLEM SELECTOR PLAN 1
Patient is markedly confused on 10/21 requiring constant observation. Continue aspirin, lipitor and lovenox full dose. On 10/22 She has been converted to low dose eliquis. Neurology input appreciated, consider discontinue aspirin and only continue eliquis for anticoagulation. 10/23 Patient is acting more appropriately.

## 2018-10-24 NOTE — PROGRESS NOTE ADULT - PROBLEM SELECTOR PLAN 4
Monitor bgm. 10/23 She is hyperglycemic, add lantu and humalog pre-meal bolus as well as scal coverage.

## 2018-10-24 NOTE — PROGRESS NOTE ADULT - PROBLEM SELECTOR PLAN 2
Add hydralazine prn for markedly elevated BP. On 10/22 BP slightly improved but still elevated. 10/23 She remains hypertensive, resume losartan.

## 2018-10-24 NOTE — PROGRESS NOTE ADULT - SUBJECTIVE AND OBJECTIVE BOX
SUBJECTIVE:  Cardiology NP F/U note:  For  seen on 4B   denies complaints of chest pain/sob/dizziness/palps overnight  tolerating   diet/OOB    MEDICATIONS:  amiodarone    Tablet 400 milliGRAM(s) Oral every 8 hours  hydrALAZINE Injectable 10 milliGRAM(s) IV Push every 6 hours PRN  losartan 50 milliGRAM(s) Oral daily  metoprolol tartrate 25 milliGRAM(s) Oral three times a day  metoprolol tartrate Injectable 5 milliGRAM(s) IV Push every 6 hours PRN  diazepam    Tablet 1 milliGRAM(s) Oral daily PRN  haloperidol    Injectable 3 milliGRAM(s) IntraMuscular every 6 hours PRN  allopurinol 50 milliGRAM(s) Oral daily  atorvastatin 40 milliGRAM(s) Oral at bedtime  dextrose 40% Gel 15 Gram(s) Oral once PRN  dextrose 50% Injectable 12.5 Gram(s) IV Push once  dextrose 50% Injectable 25 Gram(s) IV Push once  dextrose 50% Injectable 25 Gram(s) IV Push once  glucagon  Injectable 1 milliGRAM(s) IntraMuscular once PRN  insulin glargine Injectable (LANTUS) 20 Unit(s) SubCutaneous at bedtime  insulin lispro (HumaLOG) corrective regimen sliding scale   SubCutaneous three times a day before meals  insulin lispro (HumaLOG) corrective regimen sliding scale   SubCutaneous at bedtime  insulin lispro Injectable (HumaLOG) 4 Unit(s) SubCutaneous three times a day before meals  apixaban 2.5 milliGRAM(s) Oral every 12 hours  aspirin enteric coated 81 milliGRAM(s) Oral daily  dextrose 5%. 1000 milliLiter(s) IV Continuous <Continuous>  influenza   Vaccine 0.5 milliLiter(s) IntraMuscular once        PHYSICAL EXAM:    T(C): 37.1 (10-24-18 @ 13:27), Max: 37.1 (10-23-18 @ 16:30)  HR: 72 (10-24-18 @ 11:28) (72 - 84)  BP: 111/59 (10-24-18 @ 11:28) (111/59 - 173/76)  RR: 23 (10-24-18 @ 11:28) (20 - 23)  SpO2: 98% (10-24-18 @ 11:28) (93% - 98%)  Wt(kg): --    I&O's Summary    24 Oct 2018 07:01  -  24 Oct 2018 14:22  --------------------------------------------------------  IN: 240 mL / OUT: 0 mL / NET: 240 mL        Daily     Daily Weight in k.3 (24 Oct 2018 05:53)    Appearance: Normal	  HEENT:   Normal oral mucosa, 	  Lymphatic: No lymphadenopathy  Cardiovascular: Normal S1 S2 RRR 70, No JVD, 2/6 ABRAHAN LSB , No edema  Respiratory: Lungs clear to auscultation	  Psychiatry: A & O x 3, Mood & affect appropriate  Gastrointestinal:  Soft, Non-tender, + BS	  Skin: No rashes, No ecchymoses, No cyanosis  Neurologic: Non-focal broken english speech clear  Extremities: Normal range of motion, No clubbing, cyanosis or edema  Vascular: Peripheral pulses palpable 2+ bilaterally    TELEMETRY: 	RSR 70's no events on tele No PAF noted    ECG:  	RSR 73/ LAD/ RBBB/  No QT prolongation  RADIOLOGY:   DIAGNOSTIC TESTING:  [ X] Echocardiogram:  < from: TTE Echo Complete w/Doppler (10.21.18 @ 12:47) >  . Hyperdynamic global left ventricular systolic function.   2. Left ventricular ejection fraction, by visual estimation, is >75%.   3. The mitral in-flow pattern reveals no discernable A-wave, therefore   no comment on diastolic function can be made.   4. There is no left ventricular hypertrophy.   5. Thickening and calcification of the anterior and posterior mitral   valve leaflets.   6. Mild mitral valve regurgitation.   7. Moderate aortic valve stenosis.   8. Peak transaortic gradient is 38.4 mmHg, mean transaortic gradient   equals 25.0 mmHg, the calculated aortic valve area equals 1.01 cm² by the   continuity equation consistent with moderate aortic stenosis.   9. The Dimesionless Index value is 0.35 which is consistent with   moderate aortic stenosis.  10. Mild aortic regurgitation.  11. Mild tricuspid regurgitation.  12. Trace pulmonic valve regurgitation.  13. Trivial pericardial effusion.  	                                    15.8   11.5  )-----------( 249      ( 23 Oct 2018 10:31 )             47.8     10-23    143  |  106  |  36.0<H>  ----------------------------<  366<H>  4.6   |  19.0<L>  |  1.68<H>    Ca    10.1      23 Oct 2018 10:31  Phos  2.5     10-  Mg     2.1     10-23      ASSESSMENT:  87 F presents to ED C/o lightheadedness/difficulty speaking and leg wkenss for 1 week   In ED, MRI Brain with Acute infarcts & / New AF no prior HX  HX: CAD/CABG/HTN/DM2/renal insuffiency  Now with PAF, and embolic CVA   AF now maintaining RSR Rate controlled on Amio and AC with Eliqius    Plan:  Continue current meds and management  Rehab when insurance auth complete  Continue PT SUBJECTIVE:  Cardiology NP F/U note:  For  seen on 4B   denies complaints of chest pain/sob/dizziness/palps overnight  tolerating   diet/OOB    MEDICATIONS:  amiodarone    Tablet 400 milliGRAM(s) Oral every 8 hours  hydrALAZINE Injectable 10 milliGRAM(s) IV Push every 6 hours PRN  losartan 50 milliGRAM(s) Oral daily  metoprolol tartrate 25 milliGRAM(s) Oral three times a day  metoprolol tartrate Injectable 5 milliGRAM(s) IV Push every 6 hours PRN  diazepam    Tablet 1 milliGRAM(s) Oral daily PRN  haloperidol    Injectable 3 milliGRAM(s) IntraMuscular every 6 hours PRN  allopurinol 50 milliGRAM(s) Oral daily  atorvastatin 40 milliGRAM(s) Oral at bedtime  dextrose 40% Gel 15 Gram(s) Oral once PRN  dextrose 50% Injectable 12.5 Gram(s) IV Push once  dextrose 50% Injectable 25 Gram(s) IV Push once  dextrose 50% Injectable 25 Gram(s) IV Push once  glucagon  Injectable 1 milliGRAM(s) IntraMuscular once PRN  insulin glargine Injectable (LANTUS) 20 Unit(s) SubCutaneous at bedtime  insulin lispro (HumaLOG) corrective regimen sliding scale   SubCutaneous three times a day before meals  insulin lispro (HumaLOG) corrective regimen sliding scale   SubCutaneous at bedtime  insulin lispro Injectable (HumaLOG) 4 Unit(s) SubCutaneous three times a day before meals  apixaban 2.5 milliGRAM(s) Oral every 12 hours  aspirin enteric coated 81 milliGRAM(s) Oral daily  dextrose 5%. 1000 milliLiter(s) IV Continuous <Continuous>  influenza   Vaccine 0.5 milliLiter(s) IntraMuscular once        PHYSICAL EXAM:    T(C): 37.1 (10-24-18 @ 13:27), Max: 37.1 (10-23-18 @ 16:30)  HR: 72 (10-24-18 @ 11:28) (72 - 84)  BP: 111/59 (10-24-18 @ 11:28) (111/59 - 173/76)  RR: 23 (10-24-18 @ 11:28) (20 - 23)  SpO2: 98% (10-24-18 @ 11:28) (93% - 98%)  Wt(kg): --    I&O's Summary    24 Oct 2018 07:01  -  24 Oct 2018 14:22  --------------------------------------------------------  IN: 240 mL / OUT: 0 mL / NET: 240 mL      Appearance: Normal	  HEENT:   Normal oral mucosa, 	  Lymphatic: No lymphadenopathy  Cardiovascular: Normal S1 S2 RRR 70, No JVD, 2/6 ABRAHAN LSB , No edema  Respiratory: Lungs clear to auscultation	  Psychiatry: A & O x 3, Mood & affect appropriate  Gastrointestinal:  Soft, Non-tender, + BS	  Skin: No rashes, No ecchymoses, No cyanosis  Neurologic: Non-focal broken english speech clear  Extremities: Normal range of motion, No clubbing, cyanosis or edema  Vascular: Peripheral pulses palpable 2+ bilaterally    TELEMETRY: 	RSR 70's no events on tele No PAF noted    ECG:  	RSR 73/ LAD/ RBBB/  No QT prolongation  RADIOLOGY:   DIAGNOSTIC TESTING:  [ X] Echocardiogram:  < from: TTE Echo Complete w/Doppler (10.21.18 @ 12:47) >  . Hyperdynamic global left ventricular systolic function.   2. Left ventricular ejection fraction, by visual estimation, is >75%.   3. The mitral in-flow pattern reveals no discernable A-wave, therefore   no comment on diastolic function can be made.   4. There is no left ventricular hypertrophy.   5. Thickening and calcification of the anterior and posterior mitral   valve leaflets.   6. Mild mitral valve regurgitation.   7. Moderate aortic valve stenosis.   8. Peak transaortic gradient is 38.4 mmHg, mean transaortic gradient   equals 25.0 mmHg, the calculated aortic valve area equals 1.01 cm² by the   continuity equation consistent with moderate aortic stenosis.   9. The Dimesionless Index value is 0.35 which is consistent with   moderate aortic stenosis.  10. Mild aortic regurgitation.  11. Mild tricuspid regurgitation.  12. Trace pulmonic valve regurgitation.  13. Trivial pericardial effusion.  	                        15.8   11.5  )-----------( 249      ( 23 Oct 2018 10:31 )             47.8     10-23    143  |  106  |  36.0<H>  ----------------------------<  366<H>  4.6   |  19.0<L>  |  1.68<H>    Ca    10.1      23 Oct 2018 10:31  Phos  2.5     10-23  Mg     2.1     10-23      ASSESSMENT:  87 F presents to ED C/o lightheadedness/difficulty speaking and leg wkenss for 1 week   In ED, MRI Brain with Acute infarcts & / New AF no prior HX  HX: CAD/CABG/HTN/DM2/renal insuffiency  Now with PAF, and embolic CVA   AF now maintaining RSR Rate controlled on Amio and AC with Eliqius    Plan:  Continue current meds and management  Rehab when insurance auth complete  Continue PT

## 2018-10-24 NOTE — PROGRESS NOTE ADULT - PROBLEM SELECTOR PLAN 3
Cardiology input appreciated, Full dose lovenox has been discontinued and now the patient is on Eliquis and aspirin. consider discontinuing aspirin. 10/23 She continues to have episodes of rapid ventricular response, cardiology re-consulted.

## 2018-10-25 LAB
ANION GAP SERPL CALC-SCNC: 12 MMOL/L — SIGNIFICANT CHANGE UP (ref 5–17)
BUN SERPL-MCNC: 53 MG/DL — HIGH (ref 8–20)
CALCIUM SERPL-MCNC: 9.6 MG/DL — SIGNIFICANT CHANGE UP (ref 8.6–10.2)
CHLORIDE SERPL-SCNC: 108 MMOL/L — HIGH (ref 98–107)
CO2 SERPL-SCNC: 22 MMOL/L — SIGNIFICANT CHANGE UP (ref 22–29)
CREAT SERPL-MCNC: 1.89 MG/DL — HIGH (ref 0.5–1.3)
GLUCOSE BLDC GLUCOMTR-MCNC: 113 MG/DL — HIGH (ref 70–99)
GLUCOSE BLDC GLUCOMTR-MCNC: 139 MG/DL — HIGH (ref 70–99)
GLUCOSE BLDC GLUCOMTR-MCNC: 190 MG/DL — HIGH (ref 70–99)
GLUCOSE BLDC GLUCOMTR-MCNC: 228 MG/DL — HIGH (ref 70–99)
GLUCOSE SERPL-MCNC: 209 MG/DL — HIGH (ref 70–115)
HCT VFR BLD CALC: 43.3 % — SIGNIFICANT CHANGE UP (ref 37–47)
HGB BLD-MCNC: 14.8 G/DL — SIGNIFICANT CHANGE UP (ref 12–16)
MAGNESIUM SERPL-MCNC: 1.9 MG/DL — SIGNIFICANT CHANGE UP (ref 1.8–2.6)
MCHC RBC-ENTMCNC: 32.5 PG — HIGH (ref 27–31)
MCHC RBC-ENTMCNC: 34.2 G/DL — SIGNIFICANT CHANGE UP (ref 32–36)
MCV RBC AUTO: 95 FL — SIGNIFICANT CHANGE UP (ref 81–99)
PHOSPHATE SERPL-MCNC: 2.9 MG/DL — SIGNIFICANT CHANGE UP (ref 2.4–4.7)
PLATELET # BLD AUTO: 208 K/UL — SIGNIFICANT CHANGE UP (ref 150–400)
POTASSIUM SERPL-MCNC: 4.4 MMOL/L — SIGNIFICANT CHANGE UP (ref 3.5–5.3)
POTASSIUM SERPL-SCNC: 4.4 MMOL/L — SIGNIFICANT CHANGE UP (ref 3.5–5.3)
RBC # BLD: 4.56 M/UL — SIGNIFICANT CHANGE UP (ref 4.4–5.2)
RBC # FLD: 12.3 % — SIGNIFICANT CHANGE UP (ref 11–15.6)
SODIUM SERPL-SCNC: 142 MMOL/L — SIGNIFICANT CHANGE UP (ref 135–145)
WBC # BLD: 9.2 K/UL — SIGNIFICANT CHANGE UP (ref 4.8–10.8)
WBC # FLD AUTO: 9.2 K/UL — SIGNIFICANT CHANGE UP (ref 4.8–10.8)

## 2018-10-25 PROCEDURE — 99232 SBSQ HOSP IP/OBS MODERATE 35: CPT

## 2018-10-25 RX ORDER — RISPERIDONE 4 MG/1
0.25 TABLET ORAL
Qty: 0 | Refills: 0 | Status: DISCONTINUED | OUTPATIENT
Start: 2018-10-25 | End: 2018-10-26

## 2018-10-25 RX ORDER — AMIODARONE HYDROCHLORIDE 400 MG/1
2 TABLET ORAL
Qty: 0 | Refills: 0 | COMMUNITY
Start: 2018-10-25 | End: 2018-10-27

## 2018-10-25 RX ADMIN — APIXABAN 2.5 MILLIGRAM(S): 2.5 TABLET, FILM COATED ORAL at 10:50

## 2018-10-25 RX ADMIN — Medication 2: at 08:16

## 2018-10-25 RX ADMIN — AMIODARONE HYDROCHLORIDE 400 MILLIGRAM(S): 400 TABLET ORAL at 15:19

## 2018-10-25 RX ADMIN — ATORVASTATIN CALCIUM 40 MILLIGRAM(S): 80 TABLET, FILM COATED ORAL at 22:13

## 2018-10-25 RX ADMIN — INSULIN GLARGINE 20 UNIT(S): 100 INJECTION, SOLUTION SUBCUTANEOUS at 22:12

## 2018-10-25 RX ADMIN — Medication 4 UNIT(S): at 12:00

## 2018-10-25 RX ADMIN — Medication 50 MILLIGRAM(S): at 11:59

## 2018-10-25 RX ADMIN — Medication 81 MILLIGRAM(S): at 11:59

## 2018-10-25 RX ADMIN — APIXABAN 2.5 MILLIGRAM(S): 2.5 TABLET, FILM COATED ORAL at 22:13

## 2018-10-25 RX ADMIN — AMIODARONE HYDROCHLORIDE 400 MILLIGRAM(S): 400 TABLET ORAL at 22:13

## 2018-10-25 RX ADMIN — HALOPERIDOL DECANOATE 3 MILLIGRAM(S): 100 INJECTION INTRAMUSCULAR at 05:43

## 2018-10-25 RX ADMIN — Medication 4 UNIT(S): at 08:16

## 2018-10-25 RX ADMIN — LOSARTAN POTASSIUM 50 MILLIGRAM(S): 100 TABLET, FILM COATED ORAL at 05:25

## 2018-10-25 RX ADMIN — RISPERIDONE 0.25 MILLIGRAM(S): 4 TABLET ORAL at 17:24

## 2018-10-25 RX ADMIN — Medication 25 MILLIGRAM(S): at 05:25

## 2018-10-25 RX ADMIN — Medication 4 UNIT(S): at 17:24

## 2018-10-25 RX ADMIN — Medication 25 MILLIGRAM(S): at 15:19

## 2018-10-25 RX ADMIN — Medication 1 MILLIGRAM(S): at 00:03

## 2018-10-25 RX ADMIN — AMIODARONE HYDROCHLORIDE 400 MILLIGRAM(S): 400 TABLET ORAL at 05:51

## 2018-10-25 RX ADMIN — Medication 4: at 12:00

## 2018-10-25 RX ADMIN — Medication 25 MILLIGRAM(S): at 22:13

## 2018-10-25 NOTE — PROGRESS NOTE ADULT - SUBJECTIVE AND OBJECTIVE BOX
Patient in bed, son at bedside.   Patient reports that she is sleeping too much and is fatigued.   Wants to be able to move.  As per RN, patient is still restless and needs 1:1.  Plan is for AR at [facility] as per son.    FUNCTIONAL PROGRESS  10/24  Bed Mobility  Bed Mobility Training Supine-to-Sit: minimum assist (75% patient effort)    Sit-Stand Transfer Training  Transfer Training Sit-to-Stand Transfer: minimum assist (75% patient effort)  Transfer Training Stand-to-Sit Transfer: minimum assist (75% patient effort)    Gait Training  Gait Trainin feet RW [place]  Gait Analysis: unsteadiness during gait especially c turns, verbal cues for sequencing and re-direction to task, decreased gait velocity and activity tolerance        REVIEW OF SYSTEMS  Constitutional - No fever,  +fatigue  Neurological - No headaches, +memory loss, +loss of strength, No numbness, No tremors  Skin - No rashes, No lesions   Musculoskeletal - No joint pain, No joint swelling, No muscle pain  Psychiatric - No depression, No anxiety    VITALS  T(C): 36.7 (10-25-18 @ 15:16), Max: 37.3 (10-25-18 @ 08:36)  HR: 68 (10-25-18 @ 15:16) (67 - 72)  BP: 121/62 (10-25-18 @ 16:13) (121/62 - 144/71)  RR: 18 (10-25-18 @ 15:16) (18 - 22)  SpO2: 97% (10-25-18 @ 15:16) (93% - 97%)  Wt(kg): --    MEDICATIONS   allopurinol 50 milliGRAM(s) daily  amiodarone    Tablet 400 milliGRAM(s) every 8 hours  apixaban 2.5 milliGRAM(s) every 12 hours  aspirin enteric coated 81 milliGRAM(s) daily  atorvastatin 40 milliGRAM(s) at bedtime  dextrose 40% Gel 15 Gram(s) once PRN  dextrose 5%. 1000 milliLiter(s) <Continuous>  dextrose 50% Injectable 12.5 Gram(s) once  dextrose 50% Injectable 25 Gram(s) once  dextrose 50% Injectable 25 Gram(s) once  diazepam    Tablet 1 milliGRAM(s) daily PRN  glucagon  Injectable 1 milliGRAM(s) once PRN  haloperidol    Injectable 3 milliGRAM(s) every 6 hours PRN  hydrALAZINE Injectable 10 milliGRAM(s) every 6 hours PRN  influenza   Vaccine 0.5 milliLiter(s) once  insulin glargine Injectable (LANTUS) 20 Unit(s) at bedtime  insulin lispro (HumaLOG) corrective regimen sliding scale   three times a day before meals  insulin lispro (HumaLOG) corrective regimen sliding scale   at bedtime  insulin lispro Injectable (HumaLOG) 4 Unit(s) three times a day before meals  losartan 50 milliGRAM(s) daily  metoprolol tartrate 25 milliGRAM(s) three times a day  metoprolol tartrate Injectable 5 milliGRAM(s) every 6 hours PRN  risperiDONE   Tablet 0.25 milliGRAM(s) two times a day      RECENT LABS/IMAGING  CBC Full  -  ( 25 Oct 2018 08:20 )  WBC Count : 9.2 K/uL  Hemoglobin : 14.8 g/dL  Hematocrit : 43.3 %  Platelet Count - Automated : 208 K/uL  Mean Cell Volume : 95.0 fl  Mean Cell Hemoglobin : 32.5 pg  Mean Cell Hemoglobin Concentration : 34.2 g/dL  Auto Neutrophil # : x  Auto Lymphocyte # : x  Auto Monocyte # : x  Auto Eosinophil # : x  Auto Basophil # : x  Auto Neutrophil % : x  Auto Lymphocyte % : x  Auto Monocyte % : x  Auto Eosinophil % : x  Auto Basophil % : x    10-25    142  |  108<H>  |  53.0<H>  ----------------------------<  209<H>  4.4   |  22.0  |  1.89<H>    Ca    9.6      25 Oct 2018 08:20  Phos  2.9     10-25  Mg     1.9     10-25      --------------------------------------------------------------  PHYSICAL EXAM  Constitutional - NAD, Comfortable  Extremities - No C/C/E, No calf tenderness   Neurologic Exam -                    Cognitive - Awake, Alert, AAO to self, not situation     Communication - mild dysarthria, perseverative     Motor - no apparent focal deficits in the UE and LE   Psychiatric - Mood stable, Affect WNL, Fatigued    ------------------------------------------------------------------------------------------------  ASSESSMENT/PLAN  87yFemale with functional deficits after acute left ischemic CVA with new onset a. fib.  CVA/AFIB - Eliquis, ASA, Amiodarone  Cardiac - Lopressor, Cozaar  Restlessness - Risperidone Recommend DC Haldol as can have negative impacts on neurorecovery in setting of CVA, Recommend Trazodone HS as well as Melatonin with ambulation and OOB during the day  Pain - Tylenol   Muscle spasms - Recommend DC of Valium as it will make patient more fatigued  DVT PPX - SCDs, Eliquis  Rehab - Recommend ACUTE inpatient rehabilitation for the functional deficits consisting of 3 hours of therapy/day & 24 hour RN/daily PMR physician for comorbid medical management. Will continue to follow for ongoing rehab needs and recommendations. Patient will be able to tolerate 3 hours a day.    Continue bedside therapy as well as OOB throughout the day with mobilization throughout the day with staff to maintain cardiopulmonary function and prevention of secondary complications related to debility.

## 2018-10-25 NOTE — PROGRESS NOTE ADULT - PROBLEM SELECTOR PLAN 6
Patient is on bed rest until pt eval, but on 10/21 she continues to thrash about making her very high risk for fall. On 10/23 She is sitting in a chair better behaved.  Constant observation has been discontinued, then resumed.

## 2018-10-25 NOTE — PROGRESS NOTE ADULT - SUBJECTIVE AND OBJECTIVE BOX
THEO DAVILA     Chief Complaint: Patient is a 87y old  Female who presents with a chief complaint of lightheadedness, difficulty speaking, weakness       PAST MEDICAL & SURGICAL HISTORY:  Gout  Meniere disease  CAD (coronary artery disease)  HTN (hypertension)  DM (diabetes mellitus)  S/P CABG (coronary artery bypass graft)      HPI/OVERNIGHT EVENTS:    MEDICATIONS  (STANDING):  allopurinol 50 milliGRAM(s) Oral daily  apixaban 2.5 milliGRAM(s) Oral every 12 hours  aspirin enteric coated 81 milliGRAM(s) Oral daily  atorvastatin 40 milliGRAM(s) Oral at bedtime  dextrose 5%. 1000 milliLiter(s) (50 mL/Hr) IV Continuous <Continuous>  dextrose 50% Injectable 12.5 Gram(s) IV Push once  dextrose 50% Injectable 25 Gram(s) IV Push once  dextrose 50% Injectable 25 Gram(s) IV Push once  insulin glargine Injectable (LANTUS) 20 Unit(s) SubCutaneous at bedtime  insulin lispro (HumaLOG) corrective regimen sliding scale   SubCutaneous three times a day before meals  insulin lispro (HumaLOG) corrective regimen sliding scale   SubCutaneous at bedtime  insulin lispro Injectable (HumaLOG) 4 Unit(s) SubCutaneous three times a day before meals  losartan 50 milliGRAM(s) Oral daily  metoprolol tartrate 25 milliGRAM(s) Oral three times a day  risperiDONE   Tablet 0.25 milliGRAM(s) Oral two times a day     AVSS    PHYSICAL EXAM:  Constitutional:  Frail elderly female  HEENT: PERRLA, EOMI, Normal Hearing, MMM  Neck: No LAD, No JVD  Back: Normal spine flexure, No CVA tenderness  Respiratory: CTAB Cardiovascular: S1 and S2, RRR, no M/G/R  Gastrointestinal: BS+, soft, NT/ND  Extremities: No peripheral edema  Vascular: 2+ peripheral pulses  Neurological: A/O x 1 Ethiopian speaking    CAPILLARY BLOOD GLUCOSE    LABS:                        14.8   9.2   )-----------( 208      ( 25 Oct 2018 08:20 )             43.3     10-25    142  |  108<H>  |  53.0<H>  ----------------------------<  209<H>  4.4   |  22.0  |  1.89<H>    Ca    9.6      25 Oct 2018 08:20  Phos  2.9     10-25  Mg     1.9     10-25            RADIOLOGY & ADDITIONAL TESTS:

## 2018-10-25 NOTE — PROGRESS NOTE ADULT - ASSESSMENT
87 year old female acute cva on mri, Meniere disease, atrial fibrillation, htn, hyperlipidemia. MRI shows scattered chronic ischemic changes as well as acute changes in lentiform nucleus.  On 10/22 Patient continues to act confused and fighting to get out of bed requiring constant observation.  On 10/23 She is acting more appropriately but she continues to have episodes of atrial fibrillation with rapid ventricular response. I spoke with DR Soto from cardiology who will follow up. She remains hypertensive, resume losartan. 10/24 await placement. 10/25 Patient continues to require constant observation as she climbs out of bed and nearly fell.

## 2018-10-25 NOTE — PROGRESS NOTE ADULT - PROBLEM SELECTOR PLAN 4
Monitor bgm. 10/23 She is hyperglycemic, add lantu and humalog pre-meal bolus as well as scale coverage.

## 2018-10-26 VITALS
SYSTOLIC BLOOD PRESSURE: 115 MMHG | RESPIRATION RATE: 18 BRPM | DIASTOLIC BLOOD PRESSURE: 60 MMHG | OXYGEN SATURATION: 98 % | TEMPERATURE: 98 F | HEART RATE: 68 BPM

## 2018-10-26 DIAGNOSIS — N18.4 CHRONIC KIDNEY DISEASE, STAGE 4 (SEVERE): ICD-10-CM

## 2018-10-26 LAB
GLUCOSE BLDC GLUCOMTR-MCNC: 168 MG/DL — HIGH (ref 70–99)
MRSA PCR RESULT.: SIGNIFICANT CHANGE UP
S AUREUS DNA NOSE QL NAA+PROBE: SIGNIFICANT CHANGE UP

## 2018-10-26 PROCEDURE — 99239 HOSP IP/OBS DSCHRG MGMT >30: CPT

## 2018-10-26 PROCEDURE — 99232 SBSQ HOSP IP/OBS MODERATE 35: CPT

## 2018-10-26 RX ORDER — LINAGLIPTIN 5 MG/1
1 TABLET, FILM COATED ORAL
Qty: 0 | Refills: 0 | COMMUNITY

## 2018-10-26 RX ADMIN — Medication 1 MILLIGRAM(S): at 00:39

## 2018-10-26 RX ADMIN — RISPERIDONE 0.25 MILLIGRAM(S): 4 TABLET ORAL at 06:32

## 2018-10-26 RX ADMIN — Medication 2: at 08:12

## 2018-10-26 RX ADMIN — Medication 4 UNIT(S): at 08:12

## 2018-10-26 RX ADMIN — AMIODARONE HYDROCHLORIDE 400 MILLIGRAM(S): 400 TABLET ORAL at 06:32

## 2018-10-26 RX ADMIN — APIXABAN 2.5 MILLIGRAM(S): 2.5 TABLET, FILM COATED ORAL at 08:36

## 2018-10-26 RX ADMIN — Medication 25 MILLIGRAM(S): at 06:32

## 2018-10-26 RX ADMIN — LOSARTAN POTASSIUM 50 MILLIGRAM(S): 100 TABLET, FILM COATED ORAL at 06:32

## 2018-10-26 NOTE — PROGRESS NOTE ADULT - SUBJECTIVE AND OBJECTIVE BOX
Patient appears comfortable.   No distress. Able to follow commands in English, but continues to speak in her foreign language despite being able to speak English. Rn at bedside and set up for breakfast.       FUNCTIONAL PROGRESS  10/24  Sit-Stand Transfer Training  Transfer Training Sit-to-Stand Transfer: minimum assist (75% patient effort)  Transfer Training Stand-to-Sit Transfer: minimum assist (75% patient effort)    Gait Training  Gait Trainin feet RW [place]  Gait Analysis: unsteadiness during gait especially c turns, verbal cues for sequencing and re-direction to task, decreased gait velocity and activity tolerance        REVIEW OF SYSTEMS  Constitutional - No fever,  No fatigue  Neurological - No loss of strength   Skin - No rashes, No lesions     VITALS  T(C): 36.8 (10-26-18 @ 08:28), Max: 37 (10-25-18 @ 23:13)  HR: 74 (10-26-18 @ 08:28) (68 - 74)  BP: 140/81 (10-26-18 @ 08:28) (121/62 - 144/71)  RR: 18 (10-25-18 @ 23:13) (18 - 18)  SpO2: 99% (10-25-18 @ 23:13) (97% - 99%)  Wt(kg): --    MEDICATIONS   allopurinol 50 milliGRAM(s) daily  apixaban 2.5 milliGRAM(s) every 12 hours  aspirin enteric coated 81 milliGRAM(s) daily  atorvastatin 40 milliGRAM(s) at bedtime  dextrose 40% Gel 15 Gram(s) once PRN  dextrose 5%. 1000 milliLiter(s) <Continuous>  dextrose 50% Injectable 12.5 Gram(s) once  dextrose 50% Injectable 25 Gram(s) once  dextrose 50% Injectable 25 Gram(s) once  diazepam    Tablet 1 milliGRAM(s) daily PRN  glucagon  Injectable 1 milliGRAM(s) once PRN  haloperidol    Injectable 3 milliGRAM(s) every 6 hours PRN  hydrALAZINE Injectable 10 milliGRAM(s) every 6 hours PRN  insulin glargine Injectable (LANTUS) 20 Unit(s) at bedtime  insulin lispro (HumaLOG) corrective regimen sliding scale   three times a day before meals  insulin lispro (HumaLOG) corrective regimen sliding scale   at bedtime  insulin lispro Injectable (HumaLOG) 4 Unit(s) three times a day before meals  losartan 50 milliGRAM(s) daily  metoprolol tartrate 25 milliGRAM(s) three times a day  metoprolol tartrate Injectable 5 milliGRAM(s) every 6 hours PRN  risperiDONE   Tablet 0.25 milliGRAM(s) two times a day      RECENT LABS/IMAGING  CBC Full  -  ( 25 Oct 2018 08:20 )  WBC Count : 9.2 K/uL  Hemoglobin : 14.8 g/dL  Hematocrit : 43.3 %  Platelet Count - Automated : 208 K/uL  Mean Cell Volume : 95.0 fl  Mean Cell Hemoglobin : 32.5 pg  Mean Cell Hemoglobin Concentration : 34.2 g/dL  Auto Neutrophil # : x  Auto Lymphocyte # : x  Auto Monocyte # : x  Auto Eosinophil # : x  Auto Basophil # : x  Auto Neutrophil % : x  Auto Lymphocyte % : x  Auto Monocyte % : xAuto Eosinophil % : x  Auto Basophil % : x    10-25    142  |  108<H>  |  53.0<H>  ----------------------------<  209<H>  4.4   |  22.0  |  1.89<H>    Ca    9.6      25 Oct 2018 08:20  Phos  2.9     10-25  Mg     1.9     10-25        --------------------------------------------------------------  PHYSICAL EXAM  Constitutional - NAD, Comfortable  Extremities - No C/C/E, No calf tenderness   Neurologic Exam -                    Cognitive - Awake, Alert      Communication - mild dysarthria, perseverative     Motor - no focal deficits in the UE and LE   Psychiatric - Mood stable, Fatigued    ------------------------------------------------------------------------------------------------  ASSESSMENT/PLAN  87yFemale with functional deficits after acute left ischemic CVA with new onset a. fib.  CVA/AFIB - Eliquis, ASA, Amiodarone  Cardiac - Lopressor, Cozaar  Restlessness - Risperidone,  Recommend DC Haldol as can have negative impacts on neurorecovery in setting of CVA, Recommend Trazodone HS as well as Melatonin with ambulation and OOB during the day  Pain - Tylenol   Muscle spasms - Recommend DC of Valium as it will make patient more fatigued  DVT PPX - SCDs, Eliquis  Rehab - Recommend ACUTE inpatient rehabilitation for the functional deficits consisting of 3 hours of therapy/day & 24 hour RN/daily PMR physician for comorbid medical management. Will continue to follow for ongoing rehab needs and recommendations. Patient will be able to tolerate 3 hours a day.    Continue bedside therapy as well as OOB throughout the day with mobilization throughout the day with staff to maintain cardiopulmonary function and prevention of secondary complications related to debility.

## 2018-10-26 NOTE — PROGRESS NOTE ADULT - PROBLEM SELECTOR PLAN 1
Patient is markedly confused on 10/21 requiring constant observation. Continue aspirin, lipitor and lovenox full dose. On 10/22 She has been converted to low dose eliquis. Neurology input appreciated, consider discontinue aspirin and only continue eliquis for anticoagulation. 10/23 Patient is acting more appropriately.  Stable for transfer to rehab with close outpatient follow up.

## 2018-10-26 NOTE — PROGRESS NOTE ADULT - SUBJECTIVE AND OBJECTIVE BOX
CARDIOLOGY PROGRESS NOTE   ([facility])                                                                                                        Subjective: sitting in bd, Comfortable, NAD, having breakfast    Vitals:  T(C): 36.8 (10-26-18 @ 08:28), Max: 37 (10-25-18 @ 23:13)  HR: 74 (10-26-18 @ 08:28) (68 - 74)  BP: 140/81 (10-26-18 @ 08:28) (121/62 - 144/71)  RR: 18 (10-25-18 @ 23:13) (18 - 18)  SpO2: 99% (10-25-18 @ 23:13) (97% - 99%)  Wt(kg): --  I&O's Summary        PHYSICAL EXAM:  Appearance: Normal	  HEENT:   Atraumatic  Cardiovascular: Normal S1 S2, No JVD, No murmurs, No edema  Respiratory: Lungs clear to auscultation	  Gastrointestinal:  Soft, Non-tender, + BS	  Skin: No rashes, No ecchymoses, No cyanosis  Neurologic: Alert and awake, able to move extremities  Extremities: No edema    	      CURRENT MEDICATIONS:  hydrALAZINE Injectable 10 milliGRAM(s) IV Push every 6 hours PRN  losartan 50 milliGRAM(s) Oral daily  metoprolol tartrate 25 milliGRAM(s) Oral three times a day  metoprolol tartrate Injectable 5 milliGRAM(s) IV Push every 6 hours PRN        diazepam    Tablet 1 milliGRAM(s) Oral daily PRN  haloperidol    Injectable 3 milliGRAM(s) IntraMuscular every 6 hours PRN  risperiDONE   Tablet 0.25 milliGRAM(s) Oral two times a day      allopurinol 50 milliGRAM(s) Oral daily  atorvastatin 40 milliGRAM(s) Oral at bedtime  dextrose 40% Gel 15 Gram(s) Oral once PRN  dextrose 50% Injectable 12.5 Gram(s) IV Push once  dextrose 50% Injectable 25 Gram(s) IV Push once  dextrose 50% Injectable 25 Gram(s) IV Push once  glucagon  Injectable 1 milliGRAM(s) IntraMuscular once PRN  insulin glargine Injectable (LANTUS) 20 Unit(s) SubCutaneous at bedtime  insulin lispro (HumaLOG) corrective regimen sliding scale   SubCutaneous three times a day before meals  insulin lispro (HumaLOG) corrective regimen sliding scale   SubCutaneous at bedtime  insulin lispro Injectable (HumaLOG) 4 Unit(s) SubCutaneous three times a day before meals    apixaban 2.5 milliGRAM(s) Oral every 12 hours  aspirin enteric coated 81 milliGRAM(s) Oral daily  dextrose 5%. 1000 milliLiter(s) IV Continuous <Continuous>  influenza   Vaccine 0.5 milliLiter(s) IntraMuscular once      LABS:	 	                            14.8   9.2   )-----------( 208      ( 25 Oct 2018 08:20 )             43.3     10-25    142  |  108<H>  |  53.0<H>  ----------------------------<  209<H>  4.4   |  22.0  |  1.89<H>    Ca    9.6      25 Oct 2018 08:20  Phos  2.9     10-25  Mg     1.9     10-25      proBNP:   Lipid Profile: Date: 10-21 @ 08:25  Total cholesterol 240; Direct LDL: 155; HDL: 41; Triglycerides:219

## 2018-10-26 NOTE — PROGRESS NOTE ADULT - PROBLEM SELECTOR PLAN 6
Patient is on bed rest until pt eval, but on 10/21 she continues to thrash about making her very high risk for fall. On 10/23 She is sitting in a chair better behaved.  Constant observation has been discontinued, then resumed. Her activity is now ambulate with assist.

## 2018-10-26 NOTE — PROGRESS NOTE ADULT - PROBLEM SELECTOR PLAN 5
Consider ischemia work up after acute cva stabilizes. Follow up with cardiology as an outpatient.
Consider ischemia work up after acute cva stabilizes.
Consider ischemia work up after acute cva stabilizes. Follow up with cardiology as an outpatient.
Consider ischemia work up after acute cva stabilizes. Follow up with cardiology as an outpatient.

## 2018-10-26 NOTE — PROGRESS NOTE ADULT - PROBLEM SELECTOR PLAN 4
Monitor bgm. 10/23 She is hyperglycemic, add lantus and humalog pre-meal bolus as well as scale coverage.

## 2018-10-26 NOTE — PROGRESS NOTE ADULT - PROVIDER SPECIALTY LIST ADULT
Cardiology
Hospitalist
Neurology
Rehab Medicine
Rehab Medicine
Hospitalist

## 2018-10-26 NOTE — PROGRESS NOTE ADULT - ASSESSMENT
87F hx CABG 4 years ago with post operative atrial fibrillation then treated with anticoagulation that was stopped after several months now presents with difficulty speaking and leg weakness, found to have acute CVA and new onset A-fib.  CHADSVasc score of 7. Pt is started on low dose Eliquis.    Paroxysmal atrial fibrillation, Periods of RVR  Maintaining SR  ECG reviewed  Amio started, cont  Cont BB  On Eliquis  Amiodarone for rhythm control.  400 mg tid for 3 days, then bid for 3 days, then 200 mg daily      Moderate AS  Echo:  EF >75. Mod AS.    Cerebrovascular accident (CVA), unspecified mechanism  Cont ASA, Statin    CAD (coronary artery disease)  ASA/BB/statin    Hypertension  Monitor BP    Unsteady gait  Continue PT

## 2018-10-26 NOTE — PROGRESS NOTE ADULT - REASON FOR ADMISSION
lightheadedness, difficulty speaking, weakness

## 2018-10-26 NOTE — PROGRESS NOTE ADULT - ASSESSMENT
87 year old female acute cva on mri, Meniere disease, atrial fibrillation, htn, hyperlipidemia. MRI shows scattered chronic ischemic changes as well as acute changes in lentiform nucleus.  On 10/22 Patient continues to act confused and fighting to get out of bed requiring constant observation.  On 10/23 She is acting more appropriately but she continues to have episodes of atrial fibrillation with rapid ventricular response. I spoke with DR Soto from cardiology who will follow up. She remains hypertensive, resume losartan. 10/24 await placement. 10/25 Patient continues to require constant observation as she climbs out of bed and nearly fell on 10/25. On 10/26 she is much better and stable for discharge to rehab. She is an 87 year old female with cva, and atrial fibrillation who is at extremely high risk for redurrent events, especially if she does not comply with therapy.

## 2018-10-26 NOTE — PROGRESS NOTE ADULT - SUBJECTIVE AND OBJECTIVE BOX
THEO DAVILA     Chief Complaint: Patient is a 87y old  Female who presents with a chief complaint of lightheadedness, difficulty speaking, weakness (26 Oct 2018 09:49)      PAST MEDICAL & SURGICAL HISTORY:  Gout  Meniere disease  CAD (coronary artery disease)  HTN (hypertension)  DM (diabetes mellitus)  S/P CABG (coronary artery bypass graft)      HPI/OVERNIGHT EVENTS: Patient doing well, off constant observation, for discharge.    MEDICATIONS  (STANDING):  allopurinol 50 milliGRAM(s) Oral daily  apixaban 2.5 milliGRAM(s) Oral every 12 hours  aspirin enteric coated 81 milliGRAM(s) Oral daily  atorvastatin 40 milliGRAM(s) Oral at bedtime  dextrose 5%. 1000 milliLiter(s) (50 mL/Hr) IV Continuous <Continuous>  dextrose 50% Injectable 12.5 Gram(s) IV Push once  dextrose 50% Injectable 25 Gram(s) IV Push once  dextrose 50% Injectable 25 Gram(s) IV Push once  insulin glargine Injectable (LANTUS) 20 Unit(s) SubCutaneous at bedtime  insulin lispro (HumaLOG) corrective regimen sliding scale   SubCutaneous three times a day before meals  insulin lispro (HumaLOG) corrective regimen sliding scale   SubCutaneous at bedtime  insulin lispro Injectable (HumaLOG) 4 Unit(s) SubCutaneous three times a day before meals  losartan 50 milliGRAM(s) Oral daily  metoprolol tartrate 25 milliGRAM(s) Oral three times a day  risperiDONE   Tablet 0.25 milliGRAM(s) Oral two times a day      Vital Signs Last 24 Hrs  T(C): 36.8 (26 Oct 2018 08:28), Max: 37 (25 Oct 2018 23:13)  T(F): 98.2 (26 Oct 2018 08:28), Max: 98.6 (25 Oct 2018 23:13)  HR: 74 (26 Oct 2018 08:28) (68 - 74)  BP: 140/81 (26 Oct 2018 08:28) (121/62 - 144/71)  BP(mean): --  RR: 18 (25 Oct 2018 23:13) (18 - 18)  SpO2: 99% (25 Oct 2018 23:13) (97% - 99%)    PHYSICAL EXAM:  Constitutional:  Well built elderly female  HEENT: PERRLA, EOMI, Normal Hearing, MMM  Neck: No LAD, No JVD  Back: Normal spine flexure, No CVA tenderness  Respiratory: CTAB Cardiovascular: S1 and S2, RRR, no M/G/R  Gastrointestinal: BS+, soft, NT/ND  Extremities: No peripheral edema  Vascular: 2+ peripheral pulses  Neurological: A/O x 1    CAPILLARY BLOOD GLUCOSE    LABS:                        14.8   9.2   )-----------( 208      ( 25 Oct 2018 08:20 )             43.3     10-25    142  |  108<H>  |  53.0<H>  ----------------------------<  209<H>  4.4   |  22.0  |  1.89<H>    Ca    9.6      25 Oct 2018 08:20  Phos  2.9     10-25  Mg     1.9     10-25            RADIOLOGY & ADDITIONAL TESTS:

## 2018-10-28 RX ORDER — AMIODARONE HYDROCHLORIDE 400 MG/1
1 TABLET ORAL
Qty: 30 | Refills: 0
Start: 2018-10-28 | End: 2018-11-26

## 2018-11-11 PROCEDURE — 83735 ASSAY OF MAGNESIUM: CPT

## 2018-11-11 PROCEDURE — 87641 MR-STAPH DNA AMP PROBE: CPT

## 2018-11-11 PROCEDURE — 97530 THERAPEUTIC ACTIVITIES: CPT

## 2018-11-11 PROCEDURE — 97535 SELF CARE MNGMENT TRAINING: CPT

## 2018-11-11 PROCEDURE — 70450 CT HEAD/BRAIN W/O DYE: CPT

## 2018-11-11 PROCEDURE — 83036 HEMOGLOBIN GLYCOSYLATED A1C: CPT

## 2018-11-11 PROCEDURE — 36415 COLL VENOUS BLD VENIPUNCTURE: CPT

## 2018-11-11 PROCEDURE — 82962 GLUCOSE BLOOD TEST: CPT

## 2018-11-11 PROCEDURE — 99285 EMERGENCY DEPT VISIT HI MDM: CPT | Mod: 25

## 2018-11-11 PROCEDURE — 87086 URINE CULTURE/COLONY COUNT: CPT

## 2018-11-11 PROCEDURE — 93880 EXTRACRANIAL BILAT STUDY: CPT

## 2018-11-11 PROCEDURE — 80061 LIPID PANEL: CPT

## 2018-11-11 PROCEDURE — 92610 EVALUATE SWALLOWING FUNCTION: CPT

## 2018-11-11 PROCEDURE — 92526 ORAL FUNCTION THERAPY: CPT

## 2018-11-11 PROCEDURE — 82553 CREATINE MB FRACTION: CPT

## 2018-11-11 PROCEDURE — 84100 ASSAY OF PHOSPHORUS: CPT

## 2018-11-11 PROCEDURE — 80053 COMPREHEN METABOLIC PANEL: CPT

## 2018-11-11 PROCEDURE — 93306 TTE W/DOPPLER COMPLETE: CPT

## 2018-11-11 PROCEDURE — 80048 BASIC METABOLIC PNL TOTAL CA: CPT

## 2018-11-11 PROCEDURE — 97167 OT EVAL HIGH COMPLEX 60 MIN: CPT

## 2018-11-11 PROCEDURE — 71045 X-RAY EXAM CHEST 1 VIEW: CPT

## 2018-11-11 PROCEDURE — 84300 ASSAY OF URINE SODIUM: CPT

## 2018-11-11 PROCEDURE — 84484 ASSAY OF TROPONIN QUANT: CPT

## 2018-11-11 PROCEDURE — 97163 PT EVAL HIGH COMPLEX 45 MIN: CPT

## 2018-11-11 PROCEDURE — 81001 URINALYSIS AUTO W/SCOPE: CPT

## 2018-11-11 PROCEDURE — 97116 GAIT TRAINING THERAPY: CPT

## 2018-11-11 PROCEDURE — 85027 COMPLETE CBC AUTOMATED: CPT

## 2018-11-11 PROCEDURE — 87640 STAPH A DNA AMP PROBE: CPT

## 2018-11-11 PROCEDURE — 93005 ELECTROCARDIOGRAM TRACING: CPT

## 2018-11-11 PROCEDURE — 83935 ASSAY OF URINE OSMOLALITY: CPT

## 2018-11-11 PROCEDURE — 97110 THERAPEUTIC EXERCISES: CPT

## 2018-11-11 PROCEDURE — 82570 ASSAY OF URINE CREATININE: CPT

## 2018-11-11 PROCEDURE — 82550 ASSAY OF CK (CPK): CPT

## 2018-11-11 PROCEDURE — 70551 MRI BRAIN STEM W/O DYE: CPT

## 2018-11-28 NOTE — ED ADULT NURSE REASSESSMENT NOTE - ANCILLARY STATUS
H&P has been reviewed and there are no interval changes.     PE:  Blood pressure 155/73, pulse 62, resp. rate 12, height 5' 9\" (1.753 m), weight 90.3 kg, SpO2 97 %.  Heart: Regular rate and rhythm.  Lungs: CTAB  Abd: soft, non-distended.    A/P: L3-4 laminectomy.  Rationale, risks, benefits and alternatives discussed. All questions answered. Patient agrees with plan.   
awaiting MRI

## 2020-02-13 NOTE — PHYSICAL THERAPY INITIAL EVALUATION ADULT - ACTIVE RANGE OF MOTION EXAMINATION, REHAB EVAL
9
bilateral upper extremity Active ROM was WFL (within functional limits)/assessed during functional mobility/bilateral  lower extremity Active ROM was WFL (within functional limits)

## 2021-05-25 NOTE — OCCUPATIONAL THERAPY INITIAL EVALUATION ADULT - NS ASR OT EQUIP NEEDS DISCH
[facility] called and notified Dr. Kelvin Kang orders 1 month supply for safety reasons. Pt called and spoke to . Informed Dr. Kelvin Kang orders 1 month supply at a time for safety reasons and possible dose adjustments.   verbalizes [place] rolling walker (5 inch wheels)/bedside commode/shower chair

## 2022-04-09 ENCOUNTER — INPATIENT (INPATIENT)
Facility: HOSPITAL | Age: 87
LOS: 5 days | Discharge: NURSING, REHAB OR LONG-TERM CARE | DRG: 871 | End: 2022-04-15
Attending: HOSPITALIST | Admitting: HOSPITALIST
Payer: MEDICARE

## 2022-04-09 VITALS
DIASTOLIC BLOOD PRESSURE: 81 MMHG | OXYGEN SATURATION: 94 % | HEART RATE: 88 BPM | HEIGHT: 66 IN | SYSTOLIC BLOOD PRESSURE: 185 MMHG | TEMPERATURE: 100 F | WEIGHT: 165.35 LBS | RESPIRATION RATE: 16 BRPM

## 2022-04-09 DIAGNOSIS — A41.9 SEPSIS, UNSPECIFIED ORGANISM: ICD-10-CM

## 2022-04-09 DIAGNOSIS — Z95.1 PRESENCE OF AORTOCORONARY BYPASS GRAFT: Chronic | ICD-10-CM

## 2022-04-09 LAB
ALBUMIN SERPL ELPH-MCNC: 2.8 G/DL — LOW (ref 3.5–5)
ALP SERPL-CCNC: 80 U/L — SIGNIFICANT CHANGE UP (ref 40–120)
ALT FLD-CCNC: 68 U/L DA — HIGH (ref 10–60)
ANION GAP SERPL CALC-SCNC: 11 MMOL/L — SIGNIFICANT CHANGE UP (ref 5–17)
APPEARANCE UR: ABNORMAL
AST SERPL-CCNC: 104 U/L — HIGH (ref 10–40)
BASOPHILS # BLD AUTO: 0 K/UL — SIGNIFICANT CHANGE UP (ref 0–0.2)
BASOPHILS NFR BLD AUTO: 0 % — SIGNIFICANT CHANGE UP (ref 0–2)
BILIRUB SERPL-MCNC: 1.4 MG/DL — HIGH (ref 0.2–1.2)
BILIRUB UR-MCNC: NEGATIVE — SIGNIFICANT CHANGE UP
BUN SERPL-MCNC: 84 MG/DL — HIGH (ref 7–18)
CALCIUM SERPL-MCNC: 9.9 MG/DL — SIGNIFICANT CHANGE UP (ref 8.4–10.5)
CHLORIDE SERPL-SCNC: 107 MMOL/L — SIGNIFICANT CHANGE UP (ref 96–108)
CO2 SERPL-SCNC: 20 MMOL/L — LOW (ref 22–31)
COLOR SPEC: YELLOW — SIGNIFICANT CHANGE UP
CREAT SERPL-MCNC: 3.54 MG/DL — HIGH (ref 0.5–1.3)
DIFF PNL FLD: ABNORMAL
EGFR: 12 ML/MIN/1.73M2 — LOW
EOSINOPHIL # BLD AUTO: 0 K/UL — SIGNIFICANT CHANGE UP (ref 0–0.5)
EOSINOPHIL NFR BLD AUTO: 0 % — SIGNIFICANT CHANGE UP (ref 0–6)
GLUCOSE SERPL-MCNC: 215 MG/DL — HIGH (ref 70–99)
GLUCOSE UR QL: NEGATIVE — SIGNIFICANT CHANGE UP
HCT VFR BLD CALC: 42.1 % — SIGNIFICANT CHANGE UP (ref 34.5–45)
HGB BLD-MCNC: 14.3 G/DL — SIGNIFICANT CHANGE UP (ref 11.5–15.5)
KETONES UR-MCNC: NEGATIVE — SIGNIFICANT CHANGE UP
LACTATE SERPL-SCNC: 1.5 MMOL/L — SIGNIFICANT CHANGE UP (ref 0.7–2)
LACTATE SERPL-SCNC: 2.5 MMOL/L — HIGH (ref 0.7–2)
LEUKOCYTE ESTERASE UR-ACNC: ABNORMAL
LIDOCAIN IGE QN: 62 U/L — LOW (ref 73–393)
LYMPHOCYTES # BLD AUTO: 0.29 K/UL — LOW (ref 1–3.3)
LYMPHOCYTES # BLD AUTO: 1 % — LOW (ref 13–44)
MCHC RBC-ENTMCNC: 32.3 PG — SIGNIFICANT CHANGE UP (ref 27–34)
MCHC RBC-ENTMCNC: 34 GM/DL — SIGNIFICANT CHANGE UP (ref 32–36)
MCV RBC AUTO: 95 FL — SIGNIFICANT CHANGE UP (ref 80–100)
MONOCYTES # BLD AUTO: 0.57 K/UL — SIGNIFICANT CHANGE UP (ref 0–0.9)
MONOCYTES NFR BLD AUTO: 2 % — SIGNIFICANT CHANGE UP (ref 2–14)
NEUTROPHILS # BLD AUTO: 27.7 K/UL — HIGH (ref 1.8–7.4)
NEUTROPHILS NFR BLD AUTO: 91 % — HIGH (ref 43–77)
NITRITE UR-MCNC: NEGATIVE — SIGNIFICANT CHANGE UP
PH UR: 5 — SIGNIFICANT CHANGE UP (ref 5–8)
PLATELET # BLD AUTO: 190 K/UL — SIGNIFICANT CHANGE UP (ref 150–400)
POTASSIUM SERPL-MCNC: 4.9 MMOL/L — SIGNIFICANT CHANGE UP (ref 3.5–5.3)
POTASSIUM SERPL-SCNC: 4.9 MMOL/L — SIGNIFICANT CHANGE UP (ref 3.5–5.3)
PROT SERPL-MCNC: 7.7 G/DL — SIGNIFICANT CHANGE UP (ref 6–8.3)
PROT UR-MCNC: 500 MG/DL
RAPID RVP RESULT: SIGNIFICANT CHANGE UP
RBC # BLD: 4.43 M/UL — SIGNIFICANT CHANGE UP (ref 3.8–5.2)
RBC # FLD: 12.4 % — SIGNIFICANT CHANGE UP (ref 10.3–14.5)
SARS-COV-2 RNA SPEC QL NAA+PROBE: SIGNIFICANT CHANGE UP
SODIUM SERPL-SCNC: 138 MMOL/L — SIGNIFICANT CHANGE UP (ref 135–145)
SP GR SPEC: 1.01 — SIGNIFICANT CHANGE UP (ref 1.01–1.02)
UROBILINOGEN FLD QL: NEGATIVE — SIGNIFICANT CHANGE UP
WBC # BLD: 29.59 K/UL — HIGH (ref 3.8–10.5)
WBC # FLD AUTO: 29.59 K/UL — HIGH (ref 3.8–10.5)

## 2022-04-09 PROCEDURE — 99223 1ST HOSP IP/OBS HIGH 75: CPT

## 2022-04-09 PROCEDURE — 99285 EMERGENCY DEPT VISIT HI MDM: CPT

## 2022-04-09 RX ORDER — ACETAMINOPHEN 500 MG
650 TABLET ORAL ONCE
Refills: 0 | Status: COMPLETED | OUTPATIENT
Start: 2022-04-09 | End: 2022-04-09

## 2022-04-09 RX ORDER — SODIUM CHLORIDE 9 MG/ML
1000 INJECTION INTRAMUSCULAR; INTRAVENOUS; SUBCUTANEOUS ONCE
Refills: 0 | Status: COMPLETED | OUTPATIENT
Start: 2022-04-09 | End: 2022-04-09

## 2022-04-09 RX ORDER — CEFEPIME 1 G/1
1000 INJECTION, POWDER, FOR SOLUTION INTRAMUSCULAR; INTRAVENOUS ONCE
Refills: 0 | Status: COMPLETED | OUTPATIENT
Start: 2022-04-09 | End: 2022-04-09

## 2022-04-09 RX ORDER — SODIUM CHLORIDE 9 MG/ML
2000 INJECTION INTRAMUSCULAR; INTRAVENOUS; SUBCUTANEOUS ONCE
Refills: 0 | Status: COMPLETED | OUTPATIENT
Start: 2022-04-09 | End: 2022-04-09

## 2022-04-09 RX ADMIN — Medication 650 MILLIGRAM(S): at 15:09

## 2022-04-09 RX ADMIN — CEFEPIME 100 MILLIGRAM(S): 1 INJECTION, POWDER, FOR SOLUTION INTRAMUSCULAR; INTRAVENOUS at 15:19

## 2022-04-09 RX ADMIN — Medication 650 MILLIGRAM(S): at 16:04

## 2022-04-09 RX ADMIN — SODIUM CHLORIDE 2000 MILLILITER(S): 9 INJECTION INTRAMUSCULAR; INTRAVENOUS; SUBCUTANEOUS at 16:47

## 2022-04-09 RX ADMIN — CEFEPIME 1000 MILLIGRAM(S): 1 INJECTION, POWDER, FOR SOLUTION INTRAMUSCULAR; INTRAVENOUS at 16:04

## 2022-04-09 RX ADMIN — SODIUM CHLORIDE 1000 MILLILITER(S): 9 INJECTION INTRAMUSCULAR; INTRAVENOUS; SUBCUTANEOUS at 17:11

## 2022-04-09 RX ADMIN — SODIUM CHLORIDE 2000 MILLILITER(S): 9 INJECTION INTRAMUSCULAR; INTRAVENOUS; SUBCUTANEOUS at 17:23

## 2022-04-09 RX ADMIN — SODIUM CHLORIDE 1000 MILLILITER(S): 9 INJECTION INTRAMUSCULAR; INTRAVENOUS; SUBCUTANEOUS at 16:04

## 2022-04-09 RX ADMIN — SODIUM CHLORIDE 1000 MILLILITER(S): 9 INJECTION INTRAMUSCULAR; INTRAVENOUS; SUBCUTANEOUS at 16:05

## 2022-04-09 RX ADMIN — SODIUM CHLORIDE 1000 MILLILITER(S): 9 INJECTION INTRAMUSCULAR; INTRAVENOUS; SUBCUTANEOUS at 15:08

## 2022-04-09 NOTE — H&P ADULT - PROBLEM SELECTOR PLAN 1
- p/a fall per ED chart review  - ddx: UTI  - WBC 30  - lact 2.5 >1.5  - U/A pos  - CTH unremarkable  - CT abd/pel arge amount of air within the urinary bladder  - s/p cefepime and NS 4L in ED  - c/w ceftriaxone   - f/u Ucx & Bcx - p/a fall per ED chart review  - ddx: UTI  - WBC 30  - lact 2.5 >1.5  - U/A pos  - CTH unremarkable  - CT abd/pel arge amount of air within the urinary bladder  - s/p cefepime and NS 4L in ED  - c/w ceftriaxone   - f/u Ucx & Bcx    - ID, Dr. Arnold, consulted

## 2022-04-09 NOTE — H&P ADULT - ASSESSMENT
Patient is a 89yoF, with unknown PMH, presents with weakness and fall per ED chart. Admitted for fall.     primary team to obtain HPI. Unable to reach son and  not available at the time. Likely speaks [place].

## 2022-04-09 NOTE — ED ADULT NURSE NOTE - LANGUAGE ASSISTANCE NEEDED
son at bed side Jose Rodriguez/No-Patient/Caregiver offered and refused free interpretation services.

## 2022-04-09 NOTE — H&P ADULT - ATTENDING COMMENTS
Pt seen at bedside  Case discussed with MAR.  89 year old woman with PMH of DM2, HTN who presents from home with multiple episodes of nausea, vomiting and diarrhea with associated weakness and episodes of falls but ? head trauma but no LOC.  In the ED, he was noted to be febrile.    Vital Signs Last 24 Hrs  T(C): 36.9 (2022 18:00), Max: 38.4 (2022 14:36)  T(F): 98.5 (2022 18:00), Max: 101.2 (2022 14:36)  HR: 85 (2022 18:00) (81 - 88)  BP: 138/68 (2022 18:00) (134/70 - 185/81)  RR: 18 (2022 18:00) (16 - 18)  SpO2: 94% (2022 18:00) (94% - 95%)    Labs                         14.3   29.59 )-----------( 190      ( 2022 14:50 )             42.1     Lactate - 2.5 --> 1.5        138  |  107  |  84<H>  ----------------------------<  215<H>  4.9   |  20<L>  |  3.54<H>    Ca    9.9      2022 14:50    TPro  7.7  /  Alb  2.8<L>  /  TBili  1.4<H>  /  DBili  x   /  AST  104<H>  /  ALT  68<H>  /  AlkPhos  80      Urinalysis Basic - ( 2022 14:50 )    Color: Yellow / Appearance: Slightly Turbid / S.015 / pH: x  Gluc: x / Ketone: Negative  / Bili: Negative / Urobili: Negative   Blood: x / Protein: 500 mg/dL / Nitrite: Negative   Leuk Esterase: Moderate / RBC: 5-10 /HPF / WBC >50 /HPF   Sq Epi: x / Non Sq Epi: Few /HPF / Bacteria: TNTC /HPF    Imaging abdomen/chest  - Mild cardiomegaly  - Markedly atrophic left kidney.  -Scattered osseous lucencies most prominent in the iliac bones and T11 vertebral body. Findings are of indeterminate significance and may represent brown tumors.  Consider further evaluation with serum calcium and parathyroid hormone level, pelvic MRI and workup for multiple myeloma.    CT head   unremarkable    Impression   - Sepsis with UTI   * Lactic acidosis - fluid responsive and resolved  - Acute GI illness with no inflammation ? cause  -  Uncontrolled DM2 with hyperglycemia  - Advanced CKD +/- CHAZ  - HTN with elevated BP    Plan   - Admit to medicine   - Sepsis work up  - Empiric antibiotics with IV ceftriaxone 1g daily   -  IV fluid hydration @ 100cc/hour isotonic fluids  - Insulin treatment for uncontrolled DM inpatient; check A1c  - Resume Home BP meds and monitor v/signs  - Repeat chemistry in AM  - Stool studies if diarrhea persist. Pt seen at bedside  Case discussed with MAR.  89 year old woman with PMH of DM2, HTN who presents from home with multiple episodes of nausea, vomiting and diarrhea with associated weakness ? confusion and episodes of falls but ? head trauma but no LOC.  In the ED, she was noted to be febrile.    Vital Signs Last 24 Hrs  T(C): 36.9 (2022 18:00), Max: 38.4 (2022 14:36)  T(F): 98.5 (2022 18:00), Max: 101.2 (2022 14:36)  HR: 85 (2022 18:00) (81 - 88)  BP: 138/68 (2022 18:00) (134/70 - 185/81)  RR: 18 (2022 18:00) (16 - 18)  SpO2: 94% (2022 18:00) (94% - 95%)    Labs                         14.3   29.59 )-----------( 190      ( 2022 14:50 )             42.1     Lactate - 2.5 --> 1.5        138  |  107  |  84<H>  ----------------------------<  215<H>  4.9   |  20<L>  |  3.54<H>    Ca    9.9      2022 14:50    TPro  7.7  /  Alb  2.8<L>  /  TBili  1.4<H>  /  DBili  x   /  AST  104<H>  /  ALT  68<H>  /  AlkPhos  80  04    Urinalysis Basic - ( 2022 14:50 )    Color: Yellow / Appearance: Slightly Turbid / S.015 / pH: x  Gluc: x / Ketone: Negative  / Bili: Negative / Urobili: Negative   Blood: x / Protein: 500 mg/dL / Nitrite: Negative   Leuk Esterase: Moderate / RBC: 5-10 /HPF / WBC >50 /HPF   Sq Epi: x / Non Sq Epi: Few /HPF / Bacteria: TNTC /HPF    Imaging abdomen/chest  - Mild cardiomegaly  - Markedly atrophic left kidney.  -Scattered osseous lucencies most prominent in the iliac bones and T11 vertebral body. Findings are of indeterminate significance and may represent brown tumors.  Consider further evaluation with serum calcium and parathyroid hormone level, pelvic MRI and workup for multiple myeloma.    CT head   unremarkable    Impression   - Sepsis with UTI   - Suspected streptococcal  bacteremia   * Lactic acidosis - fluid responsive and resolved  - Acute GI illness with no inflammation ? cause  -  Uncontrolled DM2 with hyperglycemia  - Advanced CKD +/- CHAZ  - HTN with elevated BP    Plan   - Admit to medicine   - Sepsis work up  - Empiric antibiotics with IV ceftriaxone 1g daily   - IV fluid hydration @100cc/hour isotonic fluids  - Insulin treatment for uncontrolled DM inpatient; check A1c  - Resume Home BP meds and monitor v/signs  - Repeat chemistry in AM  - Stool studies if diarrhea persist.

## 2022-04-09 NOTE — ED ADULT NURSE NOTE - OBJECTIVE STATEMENT
Patient BIB EMS from home with c/o generalized weakness, frequent fall, diarrhea x days, on arrival alert and verbally responsive forgetful, breathing in room air, noted febrile, sepsis BW initiated.

## 2022-04-09 NOTE — ED PROVIDER NOTE - OBJECTIVE STATEMENT
Patient's son reports patient had vomiting and diarrhea all day on 4/10, diarrhea non-bloody, vomit NBNB. Became weak, fell, hit her head. Diarrhea has continued, but vomiting stopped. Patient continues to be weak, has nearly passed out several times. No fever, cp, sob, ap, dysuria, urgency, frequency.

## 2022-04-09 NOTE — H&P ADULT - RS GEN PE MLT RESP DETAILS PC
anteriorly and upper legion of posterior lung only/normal/airway patent/breath sounds equal/good air movement/respirations non-labored/clear to auscultation bilaterally/no rales/no rhonchi/no wheezes

## 2022-04-09 NOTE — H&P ADULT - HISTORY OF PRESENT ILLNESS
Patient is a 89yoF, with unknown PMH, presents with weakness and fall per ED chart. Patient does not speak English, and son is unable to be reached at the time. Voice recording with an instruction to call back was left. Multiple interpreters was called, which includes Togolese, Luxembourgish, and Croatians. Patient does not speak Togolese, and Luxembourgish and Croatians interpreters unavailable at this time.

## 2022-04-09 NOTE — ED PROVIDER NOTE - CARE PLAN
Principal Discharge DX:	CHAZ (acute kidney injury)  Secondary Diagnosis:	Acute UTI   1 Principal Discharge DX:	Severe sepsis  Secondary Diagnosis:	Acute UTI  Secondary Diagnosis:	CHAZ (acute kidney injury)

## 2022-04-09 NOTE — H&P ADULT - PROBLEM SELECTOR PLAN 4
- noted to have scattered osseous lucencies most prominent in the iliac bones and T11   vertebral body  - ddx: brown tumor  - f/u PTH

## 2022-04-09 NOTE — ED PROVIDER NOTE - OTHER FINDINGS
He returns after having seen Dr. Mittal who recommended myelogram and CT scan.  His arm pain is mostly below the elbow which could indicate C5-6 C6-7 pathology.  He has some degenerative changes at 3 for and then 45 in that order as well.  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  I agree that he may eventually need the surgery but I don't think there is any hurry so long as he has no long track signs or symptoms or profound weakness says is.  He is going to talk to Vignesh keene friend about other conservative options and I will see him as needed.  
RBPARVEZ, HOWARD

## 2022-04-09 NOTE — ED PROVIDER NOTE - PROGRESS NOTE DETAILS
Patient febrile rectally. No code sepsis called because she does not meet SIRS criteria. WBC=29K. Code sepsis called for fever and leukocytosis. Patient is resting comfortably, NAD. Signed out to Dr. Malloy, pending CTAP reading. Plan is to admit for sepsis due to UTI. CT shows Sigmoid diverticulosis without diverticulitis.  Markedly atrophic left kidney.  Scattered osseous lucencies most prominent in the iliac bones and T11 vertebral body. Findings are of indeterminate significance.

## 2022-04-09 NOTE — ED PROVIDER NOTE - CLINICAL SUMMARY MEDICAL DECISION MAKING FREE TEXT BOX
Patient with vomiting, diarrhea, near syncope. Likely dehydrated. Will get labs, CTAP, give IVF, reassess.

## 2022-04-09 NOTE — ED ADULT TRIAGE NOTE - CHIEF COMPLAINT QUOTE
as per son on Saturday slid off the bed and was on the floor for 2 hrs, multiple falls yesterday, generalized weakness since then

## 2022-04-10 DIAGNOSIS — E21.0 PRIMARY HYPERPARATHYROIDISM: ICD-10-CM

## 2022-04-10 DIAGNOSIS — I10 ESSENTIAL (PRIMARY) HYPERTENSION: ICD-10-CM

## 2022-04-10 DIAGNOSIS — N17.9 ACUTE KIDNEY FAILURE, UNSPECIFIED: ICD-10-CM

## 2022-04-10 DIAGNOSIS — Z29.9 ENCOUNTER FOR PROPHYLACTIC MEASURES, UNSPECIFIED: ICD-10-CM

## 2022-04-10 DIAGNOSIS — N39.0 URINARY TRACT INFECTION, SITE NOT SPECIFIED: ICD-10-CM

## 2022-04-10 DIAGNOSIS — I48.20 CHRONIC ATRIAL FIBRILLATION, UNSPECIFIED: ICD-10-CM
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
## 2022-04-10 DIAGNOSIS — R78.81 BACTEREMIA: ICD-10-CM

## 2022-04-10 LAB
ALBUMIN SERPL ELPH-MCNC: 2.4 G/DL — LOW (ref 3.5–5)
ALP SERPL-CCNC: 75 U/L — SIGNIFICANT CHANGE UP (ref 40–120)
ALT FLD-CCNC: 62 U/L DA — HIGH (ref 10–60)
ANION GAP SERPL CALC-SCNC: 12 MMOL/L — SIGNIFICANT CHANGE UP (ref 5–17)
ANISOCYTOSIS BLD QL: SLIGHT — SIGNIFICANT CHANGE UP
AST SERPL-CCNC: 77 U/L — HIGH (ref 10–40)
BASOPHILS # BLD AUTO: 0 K/UL — SIGNIFICANT CHANGE UP (ref 0–0.2)
BASOPHILS NFR BLD AUTO: 0 % — SIGNIFICANT CHANGE UP (ref 0–2)
BILIRUB SERPL-MCNC: 1.4 MG/DL — HIGH (ref 0.2–1.2)
BUN SERPL-MCNC: 80 MG/DL — HIGH (ref 7–18)
CALCIUM SERPL-MCNC: 9 MG/DL — SIGNIFICANT CHANGE UP (ref 8.4–10.5)
CALCIUM SERPL-MCNC: 9.3 MG/DL — SIGNIFICANT CHANGE UP (ref 8.4–10.5)
CHLORIDE SERPL-SCNC: 115 MMOL/L — HIGH (ref 96–108)
CO2 SERPL-SCNC: 16 MMOL/L — LOW (ref 22–31)
CREAT SERPL-MCNC: 2.94 MG/DL — HIGH (ref 0.5–1.3)
EGFR: 15 ML/MIN/1.73M2 — LOW
EOSINOPHIL # BLD AUTO: 0 K/UL — SIGNIFICANT CHANGE UP (ref 0–0.5)
EOSINOPHIL NFR BLD AUTO: 0 % — SIGNIFICANT CHANGE UP (ref 0–6)
GLUCOSE BLDC GLUCOMTR-MCNC: 125 MG/DL — HIGH (ref 70–99)
GLUCOSE BLDC GLUCOMTR-MCNC: 159 MG/DL — HIGH (ref 70–99)
GLUCOSE BLDC GLUCOMTR-MCNC: 165 MG/DL — HIGH (ref 70–99)
GLUCOSE BLDC GLUCOMTR-MCNC: 168 MG/DL — HIGH (ref 70–99)
GLUCOSE BLDC GLUCOMTR-MCNC: 198 MG/DL — HIGH (ref 70–99)
GLUCOSE SERPL-MCNC: 159 MG/DL — HIGH (ref 70–99)
GP B STREP DNA BLD POS QL NAA+NON-PROBE: SIGNIFICANT CHANGE UP
GRAM STN FLD: SIGNIFICANT CHANGE UP
HCT VFR BLD CALC: 40.1 % — SIGNIFICANT CHANGE UP (ref 34.5–45)
HGB BLD-MCNC: 13.8 G/DL — SIGNIFICANT CHANGE UP (ref 11.5–15.5)
LYMPHOCYTES # BLD AUTO: 0.81 K/UL — LOW (ref 1–3.3)
LYMPHOCYTES # BLD AUTO: 3 % — LOW (ref 13–44)
MACROCYTES BLD QL: SLIGHT — SIGNIFICANT CHANGE UP
MAGNESIUM SERPL-MCNC: 2.2 MG/DL — SIGNIFICANT CHANGE UP (ref 1.6–2.6)
MANUAL SMEAR VERIFICATION: SIGNIFICANT CHANGE UP
MCHC RBC-ENTMCNC: 32.9 PG — SIGNIFICANT CHANGE UP (ref 27–34)
MCHC RBC-ENTMCNC: 34.4 GM/DL — SIGNIFICANT CHANGE UP (ref 32–36)
MCV RBC AUTO: 95.7 FL — SIGNIFICANT CHANGE UP (ref 80–100)
METHOD TYPE: SIGNIFICANT CHANGE UP
MONOCYTES # BLD AUTO: 2.69 K/UL — HIGH (ref 0–0.9)
MONOCYTES NFR BLD AUTO: 10 % — SIGNIFICANT CHANGE UP (ref 2–14)
NEUTROPHILS # BLD AUTO: 23.37 K/UL — HIGH (ref 1.8–7.4)
NEUTROPHILS NFR BLD AUTO: 86 % — HIGH (ref 43–77)
NEUTS BAND # BLD: 1 % — SIGNIFICANT CHANGE UP (ref 0–8)
NRBC # BLD: 0 /100 — SIGNIFICANT CHANGE UP (ref 0–0)
PHOSPHATE SERPL-MCNC: 3.5 MG/DL — SIGNIFICANT CHANGE UP (ref 2.5–4.5)
PLAT MORPH BLD: NORMAL — SIGNIFICANT CHANGE UP
PLATELET # BLD AUTO: 178 K/UL — SIGNIFICANT CHANGE UP (ref 150–400)
POIKILOCYTOSIS BLD QL AUTO: SLIGHT — SIGNIFICANT CHANGE UP
POLYCHROMASIA BLD QL SMEAR: SLIGHT — SIGNIFICANT CHANGE UP
POTASSIUM SERPL-MCNC: 4.2 MMOL/L — SIGNIFICANT CHANGE UP (ref 3.5–5.3)
POTASSIUM SERPL-SCNC: 4.2 MMOL/L — SIGNIFICANT CHANGE UP (ref 3.5–5.3)
PROT SERPL-MCNC: 6.9 G/DL — SIGNIFICANT CHANGE UP (ref 6–8.3)
PTH-INTACT FLD-MCNC: 170 PG/ML — HIGH (ref 15–65)
RBC # BLD: 4.19 M/UL — SIGNIFICANT CHANGE UP (ref 3.8–5.2)
RBC # FLD: 12.5 % — SIGNIFICANT CHANGE UP (ref 10.3–14.5)
RBC BLD AUTO: ABNORMAL
SODIUM SERPL-SCNC: 143 MMOL/L — SIGNIFICANT CHANGE UP (ref 135–145)
SPECIMEN SOURCE: SIGNIFICANT CHANGE UP
SPECIMEN SOURCE: SIGNIFICANT CHANGE UP
WBC # BLD: 26.86 K/UL — HIGH (ref 3.8–10.5)
WBC # FLD AUTO: 26.86 K/UL — HIGH (ref 3.8–10.5)

## 2022-04-10 PROCEDURE — 99233 SBSQ HOSP IP/OBS HIGH 50: CPT

## 2022-04-10 RX ORDER — HEPARIN SODIUM 5000 [USP'U]/ML
5000 INJECTION INTRAVENOUS; SUBCUTANEOUS EVERY 12 HOURS
Refills: 0 | Status: DISCONTINUED | OUTPATIENT
Start: 2022-04-10 | End: 2022-04-10

## 2022-04-10 RX ORDER — INSULIN LISPRO 100/ML
VIAL (ML) SUBCUTANEOUS
Refills: 0 | Status: DISCONTINUED | OUTPATIENT
Start: 2022-04-10 | End: 2022-04-15

## 2022-04-10 RX ORDER — SODIUM CHLORIDE 9 MG/ML
1000 INJECTION, SOLUTION INTRAVENOUS
Refills: 0 | Status: DISCONTINUED | OUTPATIENT
Start: 2022-04-10 | End: 2022-04-13

## 2022-04-10 RX ORDER — SODIUM CHLORIDE 9 MG/ML
1000 INJECTION INTRAMUSCULAR; INTRAVENOUS; SUBCUTANEOUS
Refills: 0 | Status: DISCONTINUED | OUTPATIENT
Start: 2022-04-10 | End: 2022-04-11

## 2022-04-10 RX ORDER — DEXTROSE 50 % IN WATER 50 %
12.5 SYRINGE (ML) INTRAVENOUS ONCE
Refills: 0 | Status: DISCONTINUED | OUTPATIENT
Start: 2022-04-10 | End: 2022-04-13

## 2022-04-10 RX ORDER — CITALOPRAM 10 MG/1
20 TABLET, FILM COATED ORAL DAILY
Refills: 0 | Status: DISCONTINUED | OUTPATIENT
Start: 2022-04-10 | End: 2022-04-15

## 2022-04-10 RX ORDER — DEXTROSE 50 % IN WATER 50 %
25 SYRINGE (ML) INTRAVENOUS ONCE
Refills: 0 | Status: DISCONTINUED | OUTPATIENT
Start: 2022-04-10 | End: 2022-04-13

## 2022-04-10 RX ORDER — INSULIN GLARGINE 100 [IU]/ML
20 INJECTION, SOLUTION SUBCUTANEOUS
Qty: 0 | Refills: 0 | DISCHARGE

## 2022-04-10 RX ORDER — ALLOPURINOL 300 MG
0.5 TABLET ORAL
Qty: 0 | Refills: 0 | DISCHARGE

## 2022-04-10 RX ORDER — DEXTROSE 50 % IN WATER 50 %
25 SYRINGE (ML) INTRAVENOUS ONCE
Refills: 0 | Status: DISCONTINUED | OUTPATIENT
Start: 2022-04-10 | End: 2022-04-15

## 2022-04-10 RX ORDER — AMLODIPINE BESYLATE 2.5 MG/1
5 TABLET ORAL DAILY
Refills: 0 | Status: DISCONTINUED | OUTPATIENT
Start: 2022-04-10 | End: 2022-04-11

## 2022-04-10 RX ORDER — CEFTRIAXONE 500 MG/1
1000 INJECTION, POWDER, FOR SOLUTION INTRAMUSCULAR; INTRAVENOUS EVERY 24 HOURS
Refills: 0 | Status: DISCONTINUED | OUTPATIENT
Start: 2022-04-10 | End: 2022-04-11

## 2022-04-10 RX ORDER — INSULIN LISPRO 100/ML
3 VIAL (ML) SUBCUTANEOUS
Qty: 0 | Refills: 0 | DISCHARGE

## 2022-04-10 RX ORDER — APIXABAN 2.5 MG/1
2.5 TABLET, FILM COATED ORAL
Refills: 0 | Status: DISCONTINUED | OUTPATIENT
Start: 2022-04-10 | End: 2022-04-15

## 2022-04-10 RX ORDER — DEXTROSE 50 % IN WATER 50 %
15 SYRINGE (ML) INTRAVENOUS ONCE
Refills: 0 | Status: DISCONTINUED | OUTPATIENT
Start: 2022-04-10 | End: 2022-04-15

## 2022-04-10 RX ORDER — CITALOPRAM 10 MG/1
1 TABLET, FILM COATED ORAL
Qty: 0 | Refills: 0 | DISCHARGE

## 2022-04-10 RX ORDER — GLUCAGON INJECTION, SOLUTION 0.5 MG/.1ML
1 INJECTION, SOLUTION SUBCUTANEOUS ONCE
Refills: 0 | Status: DISCONTINUED | OUTPATIENT
Start: 2022-04-10 | End: 2022-04-15

## 2022-04-10 RX ADMIN — HEPARIN SODIUM 5000 UNIT(S): 5000 INJECTION INTRAVENOUS; SUBCUTANEOUS at 19:10

## 2022-04-10 RX ADMIN — CEFTRIAXONE 100 MILLIGRAM(S): 500 INJECTION, POWDER, FOR SOLUTION INTRAMUSCULAR; INTRAVENOUS at 06:23

## 2022-04-10 RX ADMIN — AMLODIPINE BESYLATE 5 MILLIGRAM(S): 2.5 TABLET ORAL at 20:04

## 2022-04-10 RX ADMIN — Medication 1: at 19:11

## 2022-04-10 RX ADMIN — SODIUM CHLORIDE 75 MILLILITER(S): 9 INJECTION INTRAMUSCULAR; INTRAVENOUS; SUBCUTANEOUS at 10:42

## 2022-04-10 NOTE — PATIENT PROFILE ADULT - FALL HARM RISK - HARM RISK INTERVENTIONS
Assistance with ambulation/Assistance OOB with selected safe patient handling equipment/Communicate Risk of Fall with Harm to all staff/Discuss with provider need for PT consult/Monitor for mental status changes/Monitor gait and stability/Move patient closer to nurses' station/Provide patient with walking aids - walker, cane, crutches/Reinforce activity limits and safety measures with patient and family/Reorient to person, place and time as needed/Tailored Fall Risk Interventions/Toileting schedule using arm’s reach rule for commode and bathroom/Use of alarms - bed, chair and/or voice tab/Visual Cue: Yellow wristband and red socks/Bed in lowest position, wheels locked, appropriate side rails in place/Call bell, personal items and telephone in reach/Instruct patient to call for assistance before getting out of bed or chair/Non-slip footwear when patient is out of bed/[place] to call system/Physically safe environment - no spills, clutter or unnecessary equipment/Purposeful Proactive Rounding/Room/bathroom lighting operational, light cord in reach

## 2022-04-10 NOTE — PHYSICAL THERAPY INITIAL EVALUATION ADULT - DIAGNOSIS, PT EVAL
(ICF Model) Pt. present w/impairments in Body Structures/Function, incl: Strength, Balance, cognition, leading to deficits in performing the below noted Activities (Limitations).

## 2022-04-10 NOTE — PHYSICAL THERAPY INITIAL EVALUATION ADULT - GENERAL OBSERVATIONS, REHAB EVAL
Consult received, chart reviewed. Patient received in chair, NAD. Patient agreed to EVALUATION from Physical Therapist.

## 2022-04-10 NOTE — CONSULT NOTE ADULT - SUBJECTIVE AND OBJECTIVE BOX
HPI:  Patient is a 89yoF, with unknown PMH, presents with weakness and fall per ED chart. Patient does not speak English, and son is unable to be reached at the time. Voice recording with an instruction to call back was left. Multiple interpreters was called, which includes Algerian, Pashto, and Croatians. Patient does not speak Algerian, and Pashto and Croatians interpreters unavailable at this time.  (2022 21:29)      PAST MEDICAL & SURGICAL HISTORY:  DM (diabetes mellitus)    HTN (hypertension)    CAD (coronary artery disease)    Meniere disease    Gout    Diabetes    Hypertension    S/P CABG (coronary artery bypass graft)        No Known Allergies      Meds:  amLODIPine   Tablet 5 milliGRAM(s) Oral daily  apixaban 2.5 milliGRAM(s) Oral two times a day  cefTRIAXone   IVPB 1000 milliGRAM(s) IV Intermittent every 24 hours  citalopram 20 milliGRAM(s) Oral daily  dextrose 5%. 1000 milliLiter(s) IV Continuous <Continuous>  dextrose 5%. 1000 milliLiter(s) IV Continuous <Continuous>  dextrose 50% Injectable 25 Gram(s) IV Push once  dextrose 50% Injectable 12.5 Gram(s) IV Push once  dextrose 50% Injectable 25 Gram(s) IV Push once  dextrose Oral Gel 15 Gram(s) Oral once PRN  glucagon  Injectable 1 milliGRAM(s) IntraMuscular once  insulin lispro (ADMELOG) corrective regimen sliding scale   SubCutaneous Before meals and at bedtime  sodium chloride 0.9%. 1000 milliLiter(s) IV Continuous <Continuous>      SOCIAL HISTORY:  Smoker:  YES / NO        PACK YEARS:                         WHEN QUIT?  ETOH use:  YES / NO               FREQUENCY / QUANTITY:  Ilicit Drug use:  YES / NO  Occupation:  Assisted device use (Cane / Walker):  Live with:    FAMILY HISTORY:  Family history of diabetes mellitus (Father, Mother)        VITALS:  Vital Signs Last 24 Hrs  T(C): 36.4 (10 Apr 2022 12:14), Max: 37.2 (2022 22:30)  T(F): 97.5 (10 Apr 2022 12:14), Max: 99 (2022 22:30)  HR: 79 (10 Apr 2022 14:17) (79 - 97)  BP: 186/76 (10 Apr 2022 14:17) (149/72 - 186/76)  BP(mean): 98 (10 Apr 2022 05:07) (98 - 98)  RR: 18 (10 Apr 2022 12:14) (18 - 18)  SpO2: 95% (10 Apr 2022 14:17) (94% - 95%)    LABS/DIAGNOSTIC TESTS:                          13.8   26.86 )-----------( 178      ( 10 Apr 2022 05:59 )             40.1     WBC Count: 26.86 K/uL (04-10 @ 05:59)  WBC Count: 29.59 K/uL ( @ 14:50)      04-10    143  |  115<H>  |  80<H>  ----------------------------<  159<H>  4.2   |  16<L>  |  2.94<H>    Ca    9.3      10 Apr 2022 05:59  Phos  3.5     10  Mg     2.2     10    TPro  6.9  /  Alb  2.4<L>  /  TBili  1.4<H>  /  DBili  x   /  AST  77<H>  /  ALT  62<H>  /  AlkPhos  75  04-10      Urinalysis Basic - ( 2022 14:50 )    Color: Yellow / Appearance: Slightly Turbid / S.015 / pH: x  Gluc: x / Ketone: Negative  / Bili: Negative / Urobili: Negative   Blood: x / Protein: 500 mg/dL / Nitrite: Negative   Leuk Esterase: Moderate / RBC: 5-10 /HPF / WBC >50 /HPF   Sq Epi: x / Non Sq Epi: Few /HPF / Bacteria: TNTC /HPF        LIVER FUNCTIONS - ( 10 Apr 2022 05:59 )  Alb: 2.4 g/dL / Pro: 6.9 g/dL / ALK PHOS: 75 U/L / ALT: 62 U/L DA / AST: 77 U/L / GGT: x                 LACTATE:    ABG -     CULTURES:   .Blood Blood   @ 18:52   Growth in aerobic and anaerobic bottles: Gram positive cocci in pairs  --  Blood Culture PCR          RADIOLOGY:< from: CT Abdomen and Pelvis No Cont (22 @ 18:39) >  ACC: [id number] EXAM:  CT ABDOMEN AND PELVIS                          PROCEDURE DATE:  2022          INTERPRETATION:  CLINICAL INFORMATION: Abdominal pain and fever. Renal   insufficiency.    COMPARISON: None.    CONTRAST/COMPLICATIONS:  IV Contrast: NONE  Oral Contrast: NONE  Complications: None reported at time of study completion    PROCEDURE:  CT of the Abdomen and Pelvis was performed.  Sagittal and coronal reformats were performed.    FINDINGS:  LOWER CHEST: Mild cardiomegaly. Bibasilar linear atelectasis.    LIVER: Within normal limits.  BILE DUCTS: Normal caliber.  GALLBLADDER: Within normal limits.  SPLEEN: Within normal limits.  PANCREAS: Within normal limits.  ADRENALS: Within normal limits.  KIDNEYS/URETERS: The right kidneyis normal in size without   hydronephrosis or stones.    The left kidney is markedly atrophic with associated coarse   calcifications. There is no hydronephrosis.    BLADDER: The bladder is distended. Moderate amount of air is noted within   the bladder, likely related to recent instrumentation. Clinical   correlation is recommended.  REPRODUCTIVE ORGANS: Uterus and adnexa within normal limits.    BOWEL: No bowel obstruction. Sigmoid diverticulosis without CT evidence   of diverticulitis.Normal appendix.  PERITONEUM: No ascites.  VESSELS: Atherosclerotic changes.  RETROPERITONEUM/LYMPH NODES: No lymphadenopathy.  ABDOMINAL WALL: Within normal limits.  BONES: Degenerative changes. Scattered osseous lucent lesions including   1.3 cm lucency in the T11 vertebral body and additional subcentimeter   lucencies in both iliac bones. Findings are nonspecific.    IMPRESSION:  Large amount of air within the urinary bladder, likely related to recent   instrumentation. Clinical correlation is recommended.    Sigmoid diverticulosis without diverticulitis.    Markedly atrophic left kidney.    Scattered osseous lucencies most prominent in the iliac bones and T11   vertebral body. Findings are of indeterminate significance and may   represent brown tumors.  Consider further evaluation with serum calcium   and parathyroid hormone level, pelvic MRI and workup for multiple myeloma.    --- End of Report ---            ISABELA GARRISON MD; Attending Radiologist  This document has been electronically signed. 2022  7:59PM    < end of copied text >        ROS  [  ] UNABLE TO ELICIT               HPI:  Patient is a 89yoF, with unknown PMH, presents with weakness and fall per ED chart. Patient does not speak English, and son is unable to be reached at the time. Voice recording with an instruction to call back was left. Multiple interpreters was called, which includes Ghanaian, Korean, and Croatians. Patient does not speak Ghanaian, and Korean and Croatians interpreters unavailable at this time.  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 She is unable to give me any meaningful history at this time.      PAST MEDICAL & SURGICAL HISTORY:  DM (diabetes mellitus)    HTN (hypertension)    CAD (coronary artery disease)    Meniere disease    Gout    Diabetes    Hypertension    S/P CABG (coronary artery bypass graft)        No Known Allergies      Meds:  amLODIPine   Tablet 5 milliGRAM(s) Oral daily  apixaban 2.5 milliGRAM(s) Oral two times a day  cefTRIAXone   IVPB 1000 milliGRAM(s) IV Intermittent every 24 hours  citalopram 20 milliGRAM(s) Oral daily  dextrose 5%. 1000 milliLiter(s) IV Continuous <Continuous>  dextrose 5%. 1000 milliLiter(s) IV Continuous <Continuous>  dextrose 50% Injectable 25 Gram(s) IV Push once  dextrose 50% Injectable 12.5 Gram(s) IV Push once  dextrose 50% Injectable 25 Gram(s) IV Push once  dextrose Oral Gel 15 Gram(s) Oral once PRN  glucagon  Injectable 1 milliGRAM(s) IntraMuscular once  insulin lispro (ADMELOG) corrective regimen sliding scale   SubCutaneous Before meals and at bedtime  sodium chloride 0.9%. 1000 milliLiter(s) IV Continuous <Continuous>      SOCIAL HISTORY:  Smoker:  no  ETOH use:  unknown      FAMILY HISTORY:  Family history of diabetes mellitus (Father, Mother)        VITALS:  Vital Signs Last 24 Hrs  T(C): 36.4 (10 Apr 2022 12:14), Max: 37.2 (2022 22:30)  T(F): 97.5 (10 Apr 2022 12:14), Max: 99 (2022 22:30)  HR: 79 (10 Apr 2022 14:17) (79 - 97)  BP: 186/76 (10 Apr 2022 14:17) (149/72 - 186/76)  BP(mean): 98 (10 Apr 2022 05:07) (98 - 98)  RR: 18 (10 Apr 2022 12:14) (18 - 18)  SpO2: 95% (10 Apr 2022 14:17) (94% - 95%)    LABS/DIAGNOSTIC TESTS:                          13.8   26.86 )-----------( 178      ( 10 Apr 2022 05:59 )             40.1     WBC Count: 26.86 K/uL (04-10 @ 05:59)  WBC Count: 29.59 K/uL ( @ 14:50)      04-10    143  |  115<H>  |  80<H>  ----------------------------<  159<H>  4.2   |  16<L>  |  2.94<H>    Ca    9.3      10 Apr 2022 05:59  Phos  3.5     04-10  Mg     2.2     04-10    TPro  6.9  /  Alb  2.4<L>  /  TBili  1.4<H>  /  DBili  x   /  AST  77<H>  /  ALT  62<H>  /  AlkPhos  75  04-10      Urinalysis Basic - ( 2022 14:50 )    Color: Yellow / Appearance: Slightly Turbid / S.015 / pH: x  Gluc: x / Ketone: Negative  / Bili: Negative / Urobili: Negative   Blood: x / Protein: 500 mg/dL / Nitrite: Negative   Leuk Esterase: Moderate / RBC: 5-10 /HPF / WBC >50 /HPF   Sq Epi: x / Non Sq Epi: Few /HPF / Bacteria: TNTC /HPF        LIVER FUNCTIONS - ( 10 Apr 2022 05:59 )  Alb: 2.4 g/dL / Pro: 6.9 g/dL / ALK PHOS: 75 U/L / ALT: 62 U/L DA / AST: 77 U/L / GGT: x                 LACTATE:    ABG -     CULTURES:   .Blood Blood   @ 18:52   Growth in aerobic and anaerobic bottles: Gram positive cocci in pairs  --  Blood Culture PCR          RADIOLOGY:< from: CT Abdomen and Pelvis No Cont (22 @ 18:39) >  ACC: [id number] EXAM:  CT ABDOMEN AND PELVIS                          PROCEDURE DATE:  2022          INTERPRETATION:  CLINICAL INFORMATION: Abdominal pain and fever. Renal   insufficiency.    COMPARISON: None.    CONTRAST/COMPLICATIONS:  IV Contrast: NONE  Oral Contrast: NONE  Complications: None reported at time of study completion    PROCEDURE:  CT of the Abdomen and Pelvis was performed.  Sagittal and coronal reformats were performed.    FINDINGS:  LOWER CHEST: Mild cardiomegaly. Bibasilar linear atelectasis.    LIVER: Within normal limits.  BILE DUCTS: Normal caliber.  GALLBLADDER: Within normal limits.  SPLEEN: Within normal limits.  PANCREAS: Within normal limits.  ADRENALS: Within normal limits.  KIDNEYS/URETERS: The right kidneyis normal in size without   hydronephrosis or stones.    The left kidney is markedly atrophic with associated coarse   calcifications. There is no hydronephrosis.    BLADDER: The bladder is distended. Moderate amount of air is noted within   the bladder, likely related to recent instrumentation. Clinical   correlation is recommended.  REPRODUCTIVE ORGANS: Uterus and adnexa within normal limits.    BOWEL: No bowel obstruction. Sigmoid diverticulosis without CT evidence   of diverticulitis.Normal appendix.  PERITONEUM: No ascites.  VESSELS: Atherosclerotic changes.  RETROPERITONEUM/LYMPH NODES: No lymphadenopathy.  ABDOMINAL WALL: Within normal limits.  BONES: Degenerative changes. Scattered osseous lucent lesions including   1.3 cm lucency in the T11 vertebral body and additional subcentimeter   lucencies in both iliac bones. Findings are nonspecific.    IMPRESSION:  Large amount of air within the urinary bladder, likely related to recent   instrumentation. Clinical correlation is recommended.    Sigmoid diverticulosis without diverticulitis.    Markedly atrophic left kidney.    Scattered osseous lucencies most prominent in the iliac bones and T11   vertebral body. Findings are of indeterminate significance and may   represent brown tumors.  Consider further evaluation with serum calcium   and parathyroid hormone level, pelvic MRI and workup for multiple myeloma.    --- End of Report ---            ISABELA GARRISON MD; Attending Radiologist  This document has been electronically signed. 2022  7:59PM    < end of copied text >        ROS  [x]UNABLE  TO ELICIT

## 2022-04-10 NOTE — PROGRESS NOTE ADULT - PROBLEM SELECTOR PLAN 3
- noted to have Cr 3.54 (unknown baseline)  - s/p NS 4L bolus in ED  - f/u u-elytes Patient per [employer] is on losartan 100mg daily  -Given CHAZ will start amlodipine 5mg daily Patient with chronic atrial fibrillation per chart review with [id number] per prior cardiology note. Was admitted to [facility] in 2018 for acute CVA  -Continue Eliquis 2.5mg po BID  -Does not appear to currently be on a BB per [employer]  -Will need to clarify med rec in the AM

## 2022-04-10 NOTE — PHYSICAL THERAPY INITIAL EVALUATION ADULT - GAIT DEVIATIONS NOTED, PT EVAL
decreased mili/increased time in double stance/decreased velocity of limb motion/decreased step length/decreased stride length/decreased weight-shifting ability

## 2022-04-10 NOTE — PROGRESS NOTE ADULT - PROBLEM SELECTOR PLAN 4
- noted to have scattered osseous lucencies most prominent in the iliac bones and T11   vertebral body  - ddx: brown tumor  - f/u PTH - p/a fall per ED chart review  - ddx: UTI  - WBC 26.8K now  - lact 2.5 >1.5  - U/A pos  - CTH unremarkable  - CT abd/pel arge amount of air within the urinary bladder  - s/p cefepime and NS 4L in ED  - c/w ceftriaxone   - PT recommends KERMIT    - ID, Dr. Arnold, consulted Patient per [employer] is on losartan 100mg daily  -Given CHAZ will start amlodipine 5mg daily

## 2022-04-10 NOTE — PROGRESS NOTE ADULT - SUBJECTIVE AND OBJECTIVE BOX
[facility] Medicine    Patient is a 89y old  Female who presents with a chief complaint of Fall (2022 21:29)      SUBJECTIVE / OVERNIGHT EVENTS: No acute events overnight. Attempted to communicate with patient via Uzbek  (speaks Uzbek per son Jose) [phone], Harun, but patient not making sense per Harun. Per son, Jose, patient frequently gets UTI's but does not usually get confused from them. He also reports that the patient has a known colovesicular fistula which may explain the air seen in the patient's bladder. At baseline, Jose reports that the patient is AOx3, ambulates with a walker, has a HHA 4 hours/week who cooks for her. Patient is even able to independently boil water.     MEDICATIONS  (STANDING):  amLODIPine   Tablet 5 milliGRAM(s) Oral daily  cefTRIAXone   IVPB 1000 milliGRAM(s) IV Intermittent every 24 hours  dextrose 5%. 1000 milliLiter(s) (50 mL/Hr) IV Continuous <Continuous>  dextrose 5%. 1000 milliLiter(s) (100 mL/Hr) IV Continuous <Continuous>  dextrose 50% Injectable 25 Gram(s) IV Push once  dextrose 50% Injectable 12.5 Gram(s) IV Push once  dextrose 50% Injectable 25 Gram(s) IV Push once  glucagon  Injectable 1 milliGRAM(s) IntraMuscular once  heparin   Injectable 5000 Unit(s) SubCutaneous every 12 hours  insulin lispro (ADMELOG) corrective regimen sliding scale   SubCutaneous Before meals and at bedtime  sodium chloride 0.9%. 1000 milliLiter(s) (75 mL/Hr) IV Continuous <Continuous>    MEDICATIONS  (PRN):  dextrose Oral Gel 15 Gram(s) Oral once PRN Blood Glucose LESS THAN 70 milliGRAM(s)/deciliter      Vital Signs Last 24 Hrs  T(C): 36.4 (04-10-22 @ 12:14)  T(F): 97.5 (04-10-22 @ 12:14), Max: 99 (22 @ 22:30)  HR: 79 (04-10-22 @ 14:17) (79 - 97)  BP: 186/76 (04-10-22 @ 14:17)  BP(mean): 98 (04-10-22 @ 05:07) (98 - 98)  RR: 18 (04-10-22 @ 12:14) (18 - 18)  SpO2: 95% (04-10-22 @ 14:17) (94% - 95%)  Wt(kg): --    CAPILLARY BLOOD GLUCOSE      POCT Blood Glucose.: 159 mg/dL (10 Apr 2022 18:27)  POCT Blood Glucose.: 168 mg/dL (10 Apr 2022 16:54)  POCT Blood Glucose.: 198 mg/dL (10 Apr 2022 10:56)  POCT Blood Glucose.: 165 mg/dL (10 Apr 2022 08:13)    I&O's Summary      PHYSICAL EXAM:  GENERAL: NAD, well-developed, ambulates with assistance to the bathroom  HEAD:  Atraumatic, Normocephalic  EYES:  conjunctiva and sclera clear  NECK: Supple, No JVD  CHEST/LUNG: Clear to auscultation bilaterally; No wheeze, rhonchi, rales  HEART: Regular rate and rhythm; No murmurs, rubs, or gallops  ABDOMEN: Soft, Nontender, Nondistended; Bowel sounds present  EXTREMITIES:  2+ Peripheral Pulses, No clubbing, cyanosis, or edema  PSYCH: AAOx0? Calm but unable to assess  NEUROLOGY: non-focal grossly, seen ambulating to the bathroom  SKIN: No rashes or lesions    LABS:                        13.8   26.86 )-----------( 178      ( 10 Apr 2022 05:59 )             40.1     04-10    143  |  115<H>  |  80<H>  ----------------------------<  159<H>  4.2   |  16<L>  |  2.94<H>    Ca    9.3      10 Apr 2022 05:59  Phos  3.5     04-10  Mg     2.2     04-10    TPro  6.9  /  Alb  2.4<L>  /  TBili  1.4<H>  /  DBili  x   /  AST  77<H>  /  ALT  62<H>  /  AlkPhos  75  04-10          Urinalysis Basic - ( 2022 14:50 )    Color: Yellow / Appearance: Slightly Turbid / S.015 / pH: x  Gluc: x / Ketone: Negative  / Bili: Negative / Urobili: Negative   Blood: x / Protein: 500 mg/dL / Nitrite: Negative   Leuk Esterase: Moderate / RBC: 5-10 /HPF / WBC >50 /HPF   Sq Epi: x / Non Sq Epi: Few /HPF / Bacteria: TNTC /HPF        RADIOLOGY & ADDITIONAL TESTS:    Imaging Personally Reviewed:  < from: CT Head No Cont (22 @ 18:37) >  IMPRESSION:    No evidence for calvarial fracture or acute intracranial hemorrhage. If   the patient has new and persistent symptoms, consider short interval   follow-up head CT or brain MRI follow-up if there are no MRI   contraindications.    < end of copied text >    < from: CT Abdomen and Pelvis No Cont (22 @ 18:39) >  IMPRESSION:  Large amount of air within the urinary bladder, likely related to recent   instrumentation. Clinical correlation is recommended.    Sigmoid diverticulosis without diverticulitis.    Markedly atrophic left kidney.    Scattered osseous lucencies most prominent in the iliac bones and T11   vertebral body. Findings are of indeterminate significance and may   represent brown tumors.  Consider further evaluation with serum calcium   and parathyroid hormone level, pelvic MRI and workup for multiple myeloma.    < end of copied text >      Consultant(s) Notes Reviewed:      Care Discussed with Consultants/Other Providers: ID (Dr. Arnold) re: C/S for GBS bacteremia    Assessment and Plan: [facility]    Patient is a 89y old  Female who presents with a chief complaint of Fall (2022 21:29)      SUBJECTIVE / OVERNIGHT EVENTS: No acute events overnight. Attempted to communicate with patient via Sinhala  (speaks Sinhala per son Jose) [phone], Harun, but patient not making sense per Harun. Per son, Jose, patient frequently gets UTI's but does not usually get confused from them. He also reports that the patient has a known colovesicular fistula which may explain the air seen in the patient's bladder. At baseline, Jose reports that the patient is AOx3, ambulates with a walker, has a HHA 4 hours/week who cooks for her. Patient is even able to independently boil water.     MEDICATIONS  (STANDING):  amLODIPine   Tablet 5 milliGRAM(s) Oral daily  cefTRIAXone   IVPB 1000 milliGRAM(s) IV Intermittent every 24 hours  dextrose 5%. 1000 milliLiter(s) (50 mL/Hr) IV Continuous <Continuous>  dextrose 5%. 1000 milliLiter(s) (100 mL/Hr) IV Continuous <Continuous>  dextrose 50% Injectable 25 Gram(s) IV Push once  dextrose 50% Injectable 12.5 Gram(s) IV Push once  dextrose 50% Injectable 25 Gram(s) IV Push once  glucagon  Injectable 1 milliGRAM(s) IntraMuscular once  heparin   Injectable 5000 Unit(s) SubCutaneous every 12 hours  insulin lispro (ADMELOG) corrective regimen sliding scale   SubCutaneous Before meals and at bedtime  sodium chloride 0.9%. 1000 milliLiter(s) (75 mL/Hr) IV Continuous <Continuous>    MEDICATIONS  (PRN):  dextrose Oral Gel 15 Gram(s) Oral once PRN Blood Glucose LESS THAN 70 milliGRAM(s)/deciliter      Vital Signs Last 24 Hrs  T(C): 36.4 (04-10-22 @ 12:14)  T(F): 97.5 (04-10-22 @ 12:14), Max: 99 (22 @ 22:30)  HR: 79 (04-10-22 @ 14:17) (79 - 97)  BP: 186/76 (04-10-22 @ 14:17)  BP(mean): 98 (04-10-22 @ 05:07) (98 - 98)  RR: 18 (04-10-22 @ 12:14) (18 - 18)  SpO2: 95% (04-10-22 @ 14:17) (94% - 95%)  Wt(kg): --    CAPILLARY BLOOD GLUCOSE      POCT Blood Glucose.: 159 mg/dL (10 Apr 2022 18:27)  POCT Blood Glucose.: 168 mg/dL (10 Apr 2022 16:54)  POCT Blood Glucose.: 198 mg/dL (10 Apr 2022 10:56)  POCT Blood Glucose.: 165 mg/dL (10 Apr 2022 08:13)    I&O's Summary      PHYSICAL EXAM:  GENERAL: NAD, well-developed, ambulates with assistance to the bathroom  HEAD:  Atraumatic, Normocephalic  EYES:  conjunctiva and sclera clear  NECK: Supple, No JVD  CHEST/LUNG: Clear to auscultation bilaterally; No wheeze, rhonchi, rales  HEART: Regular rate and rhythm; No murmurs, rubs, or gallops  ABDOMEN: Soft, Nontender, Nondistended; Bowel sounds present  EXTREMITIES:  2+ Peripheral Pulses, No clubbing, cyanosis, or edema  PSYCH: AAOx0? Calm but unable to assess due to inability to participate with   NEUROLOGY: non-focal grossly, seen ambulating to the bathroom  SKIN: No rashes or lesions    LABS:                        13.8   26.86 )-----------( 178      ( 10 Apr 2022 05:59 )             40.1     04-10    143  |  115<H>  |  80<H>  ----------------------------<  159<H>  4.2   |  16<L>  |  2.94<H>    Ca    9.3      10 Apr 2022 05:59  Phos  3.5     04-10  Mg     2.2     04-10    TPro  6.9  /  Alb  2.4<L>  /  TBili  1.4<H>  /  DBili  x   /  AST  77<H>  /  ALT  62<H>  /  AlkPhos  75  04-10          Urinalysis Basic - ( 2022 14:50 )    Color: Yellow / Appearance: Slightly Turbid / S.015 / pH: x  Gluc: x / Ketone: Negative  / Bili: Negative / Urobili: Negative   Blood: x / Protein: 500 mg/dL / Nitrite: Negative   Leuk Esterase: Moderate / RBC: 5-10 /HPF / WBC >50 /HPF   Sq Epi: x / Non Sq Epi: Few /HPF / Bacteria: TNTC /HPF        RADIOLOGY & ADDITIONAL TESTS:    Imaging Personally Reviewed:  < from: CT Head No Cont (22 @ 18:37) >  IMPRESSION:    No evidence for calvarial fracture or acute intracranial hemorrhage. If   the patient has new and persistent symptoms, consider short interval   follow-up head CT or brain MRI follow-up if there are no MRI   contraindications.    < end of copied text >    < from: CT Abdomen and Pelvis No Cont (22 @ 18:39) >  IMPRESSION:  Large amount of air within the urinary bladder, likely related to recent   instrumentation. Clinical correlation is recommended.    Sigmoid diverticulosis without diverticulitis.    Markedly atrophic left kidney.    Scattered osseous lucencies most prominent in the iliac bones and T11   vertebral body. Findings are of indeterminate significance and may   represent brown tumors.  Consider further evaluation with serum calcium   and parathyroid hormone level, pelvic MRI and workup for multiple myeloma.    < end of copied text >      Consultant(s) Notes Reviewed:      Care Discussed with Consultants/Other Providers: ID (Dr. Arnold) re: C/S for GBS bacteremia    Assessment and Plan: [facility] Medicine    Patient is a 89y old  Female who presents with a chief complaint of Fall (2022 21:29)      SUBJECTIVE / OVERNIGHT EVENTS: No acute events overnight. Attempted to communicate with patient via Georgian  (speaks Georgian per son Jose) [phone], Harun, but patient not making sense per Harun. Per son, Jose, patient frequently gets UTI's but does not usually get confused from them. He also reports that the patient has a known colovesicular fistula which may explain the air seen in the patient's bladder. At baseline, Jose reports that the patient is AOx3, ambulates with a walker, has a HHA 4 hours/week who cooks for her. Patient is even able to independently boil water. Chart reviewed from 2018 and patient with hx of a-fib and CVA.    MEDICATIONS  (STANDING):  amLODIPine   Tablet 5 milliGRAM(s) Oral daily  cefTRIAXone   IVPB 1000 milliGRAM(s) IV Intermittent every 24 hours  dextrose 5%. 1000 milliLiter(s) (50 mL/Hr) IV Continuous <Continuous>  dextrose 5%. 1000 milliLiter(s) (100 mL/Hr) IV Continuous <Continuous>  dextrose 50% Injectable 25 Gram(s) IV Push once  dextrose 50% Injectable 12.5 Gram(s) IV Push once  dextrose 50% Injectable 25 Gram(s) IV Push once  glucagon  Injectable 1 milliGRAM(s) IntraMuscular once  heparin   Injectable 5000 Unit(s) SubCutaneous every 12 hours  insulin lispro (ADMELOG) corrective regimen sliding scale   SubCutaneous Before meals and at bedtime  sodium chloride 0.9%. 1000 milliLiter(s) (75 mL/Hr) IV Continuous <Continuous>    MEDICATIONS  (PRN):  dextrose Oral Gel 15 Gram(s) Oral once PRN Blood Glucose LESS THAN 70 milliGRAM(s)/deciliter      Vital Signs Last 24 Hrs  T(C): 36.4 (04-10-22 @ 12:14)  T(F): 97.5 (04-10-22 @ 12:14), Max: 99 (22 @ 22:30)  HR: 79 (04-10-22 @ 14:17) (79 - 97)  BP: 186/76 (04-10-22 @ 14:17)  BP(mean): 98 (04-10-22 @ 05:07) (98 - 98)  RR: 18 (04-10-22 @ 12:14) (18 - 18)  SpO2: 95% (04-10-22 @ 14:17) (94% - 95%)  Wt(kg): --    CAPILLARY BLOOD GLUCOSE      POCT Blood Glucose.: 159 mg/dL (10 Apr 2022 18:27)  POCT Blood Glucose.: 168 mg/dL (10 Apr 2022 16:54)  POCT Blood Glucose.: 198 mg/dL (10 Apr 2022 10:56)  POCT Blood Glucose.: 165 mg/dL (10 Apr 2022 08:13)    I&O's Summary      PHYSICAL EXAM:  GENERAL: NAD, well-developed, ambulates with assistance to the bathroom  HEAD:  Atraumatic, Normocephalic  EYES:  conjunctiva and sclera clear  NECK: Supple, No JVD  CHEST/LUNG: Clear to auscultation bilaterally; No wheeze, rhonchi, rales  HEART: Regular rate and rhythm; No murmurs, rubs, or gallops  ABDOMEN: Soft, Nontender, Nondistended; Bowel sounds present  EXTREMITIES:  2+ Peripheral Pulses, No clubbing, cyanosis, or edema  PSYCH: AAOx0? Calm but unable to assess due to inability to participate with   NEUROLOGY: non-focal grossly, seen ambulating to the bathroom  SKIN: No rashes or lesions    LABS:                        13.8   26.86 )-----------( 178      ( 10 Apr 2022 05:59 )             40.1     04-10    143  |  115<H>  |  80<H>  ----------------------------<  159<H>  4.2   |  16<L>  |  2.94<H>    Ca    9.3      10 Apr 2022 05:59  Phos  3.5     04-10  Mg     2.2     04-10    TPro  6.9  /  Alb  2.4<L>  /  TBili  1.4<H>  /  DBili  x   /  AST  77<H>  /  ALT  62<H>  /  AlkPhos  75  04-10          Urinalysis Basic - ( 2022 14:50 )    Color: Yellow / Appearance: Slightly Turbid / S.015 / pH: x  Gluc: x / Ketone: Negative  / Bili: Negative / Urobili: Negative   Blood: x / Protein: 500 mg/dL / Nitrite: Negative   Leuk Esterase: Moderate / RBC: 5-10 /HPF / WBC >50 /HPF   Sq Epi: x / Non Sq Epi: Few /HPF / Bacteria: TNTC /HPF        RADIOLOGY & ADDITIONAL TESTS:    Imaging Personally Reviewed:  < from: CT Head No Cont (22 @ 18:37) >  IMPRESSION:    No evidence for calvarial fracture or acute intracranial hemorrhage. If   the patient has new and persistent symptoms, consider short interval   follow-up head CT or brain MRI follow-up if there are no MRI   contraindications.    < end of copied text >    < from: CT Abdomen and Pelvis No Cont (22 @ 18:39) >  IMPRESSION:  Large amount of air within the urinary bladder, likely related to recent   instrumentation. Clinical correlation is recommended.    Sigmoid diverticulosis without diverticulitis.    Markedly atrophic left kidney.    Scattered osseous lucencies most prominent in the iliac bones and T11   vertebral body. Findings are of indeterminate significance and may   represent brown tumors.  Consider further evaluation with serum calcium   and parathyroid hormone level, pelvic MRI and workup for multiple myeloma.    < end of copied text >      Consultant(s) Notes Reviewed:      Care Discussed with Consultants/Other Providers: ID (Dr. Arnold) re: C/S for GBS bacteremia    Assessment and Plan:

## 2022-04-10 NOTE — PHYSICAL THERAPY INITIAL EVALUATION ADULT - ACTIVE RANGE OF MOTION EXAMINATION, REHAB EVAL
BUE at least 2/3 range and b/l at least 1/2 range, but highly limited in assessment secondary to impaired ability to follow commands

## 2022-04-10 NOTE — CONSULT NOTE ADULT - ASSESSMENT
UTI  Bacteremia - ? real or contaminant  Leukocytosis    Plan - Cont Rocephin 1gm iv q24hrs  await culture results  will repeat blood culture if not already done.

## 2022-04-10 NOTE — PROGRESS NOTE ADULT - PROBLEM SELECTOR PLAN 6
- noted to have scattered osseous lucencies most prominent in the iliac bones and T11   vertebral body  - ddx: brown tumor  - f/u PTH - noted to have Cr 3.54 (unknown baseline)  - s/p NS 4L bolus in ED  - f/u u-elytes  - Cr slightly improved to 2.94

## 2022-04-10 NOTE — PROGRESS NOTE ADULT - PROBLEM SELECTOR PLAN 5
- DVT: heparin - noted to have Cr 3.54 (unknown baseline)  - s/p NS 4L bolus in ED  - f/u u-elytes  - Cr slightly improved to 2.94 - p/a fall per ED chart review  - ddx: UTI  - WBC 26.8K now  - lact 2.5 >1.5  - U/A pos  - CTH unremarkable  - CT abd/pel arge amount of air within the urinary bladder  - s/p cefepime and NS 4L in ED  - c/w ceftriaxone   - PT recommends KERMIT    - ID, Dr. Arnold, consulted

## 2022-04-10 NOTE — PROGRESS NOTE ADULT - PROBLEM SELECTOR PLAN 7
- DVT: Eliquis (will need to clarify indication with family in the AM, but picked up 3/22/22 per [employer]) - noted to have scattered osseous lucencies most prominent in the iliac bones and T11   vertebral body  - ddx: brown tumor  - f/u PTH

## 2022-04-10 NOTE — PROGRESS NOTE ADULT - ASSESSMENT
Patient is a 89yoF, with unknown PMH, presents with weakness and fall per ED chart. Admitted for fall found to have UTI and GBS bacteremia.

## 2022-04-10 NOTE — CHART NOTE - NSCHARTNOTEFT_GEN_A_CORE
OBJECTIVE:  Vital Signs Last 24 Hrs  T(C): 36.6 (10 Apr 2022 08:24), Max: 38.4 (09 Apr 2022 14:36)  T(F): 97.8 (10 Apr 2022 08:24), Max: 101.2 (09 Apr 2022 14:36)  HR: 96 (10 Apr 2022 08:24) (81 - 97)  BP: 177/77 (10 Apr 2022 08:24) (134/70 - 185/81)  BP(mean): 98 (10 Apr 2022 05:07) (98 - 98)  RR: 18 (10 Apr 2022 08:24) (16 - 18)  SpO2: 94% (10 Apr 2022 08:24) (94% - 95%)    FOCUSED PHYSICAL EXAM: NAD, ambulating in hallway to bathroom , breathing comfortably. Spoke with Son over phone and updated regarding pt's diagnosis and treatment plan.     LABS:                        13.8   x     )-----------( 178      ( 10 Apr 2022 05:59 )             40.1     04-10    143  |  115<H>  |  80<H>  ----------------------------<  159<H>  4.2   |  16<L>  |  2.94<H>    Ca    9.3      10 Apr 2022 05:59  Phos  3.5     04-10  Mg     2.2     04-10    TPro  6.9  /  Alb  2.4<L>  /  TBili  1.4<H>  /  DBili  x   /  AST  77<H>  /  ALT  62<H>  /  AlkPhos  75  04-10      HPI:  Patient is a 89yoF, with unknown PMH, presents with weakness and fall per ED chart. admitted for sepsis 2/2 UTI. on IV abx, Blood Cx + For GPC, IF DR Arnold consulted.    PLAN:   - F/u urine CX  - f/u repeat blood cultures  - F/u ID recs

## 2022-04-10 NOTE — PROGRESS NOTE ADULT - PROBLEM SELECTOR PLAN 1
- p/a fall per ED chart review  - ddx: UTI  - WBC 30  - lact 2.5 >1.5  - U/A pos  - CTH unremarkable  - CT abd/pel arge amount of air within the urinary bladder  - s/p cefepime and NS 4L in ED  - c/w ceftriaxone   - f/u Ucx & Bcx    - ID, Dr. Arnold, consulted Patient with GBS bacteremia in 4/4 bottles  -Continue with ceftriaxone  -Follow-up ID recommendations  -Source unclear. ?Urine? Patient with known colovesicular fistula per discussion with sonJose  -Follow-up repeat blood cultures  -Trend WBC

## 2022-04-10 NOTE — PHYSICAL THERAPY INITIAL EVALUATION ADULT - ADDITIONAL COMMENTS
Per RN, Son stated pt ambulated with rolling walker at baseline, but has had functional decline past few days.

## 2022-04-11 DIAGNOSIS — G93.40 ENCEPHALOPATHY, UNSPECIFIED: ICD-10-CM

## 2022-04-11 DIAGNOSIS — R33.9 RETENTION OF URINE, UNSPECIFIED: ICD-10-CM

## 2022-04-11 PROBLEM — H81.09 MENIERE'S DISEASE, UNSPECIFIED EAR: Chronic | Status: ACTIVE | Noted: 2018-10-20

## 2022-04-11 PROBLEM — M10.9 GOUT, UNSPECIFIED: Chronic | Status: ACTIVE | Noted: 2018-10-20

## 2022-04-11 PROBLEM — I25.10 ATHEROSCLEROTIC HEART DISEASE OF NATIVE CORONARY ARTERY WITHOUT ANGINA PECTORIS: Chronic | Status: ACTIVE | Noted: 2018-10-20

## 2022-04-11 PROBLEM — E11.9 TYPE 2 DIABETES MELLITUS WITHOUT COMPLICATIONS: Chronic | Status: ACTIVE | Noted: 2018-10-20

## 2022-04-11 PROBLEM — I10 ESSENTIAL (PRIMARY) HYPERTENSION: Chronic | Status: ACTIVE | Noted: 2018-10-20

## 2022-04-11 LAB
-  CEFTRIAXONE: SIGNIFICANT CHANGE UP
-  CLINDAMYCIN: SIGNIFICANT CHANGE UP
-  LEVOFLOXACIN: SIGNIFICANT CHANGE UP
-  VANCOMYCIN: SIGNIFICANT CHANGE UP
A1C WITH ESTIMATED AVERAGE GLUCOSE RESULT: 6.8 % — HIGH (ref 4–5.6)
ALBUMIN SERPL ELPH-MCNC: 2.6 G/DL — LOW (ref 3.5–5)
ALP SERPL-CCNC: 75 U/L — SIGNIFICANT CHANGE UP (ref 40–120)
ALT FLD-CCNC: 63 U/L DA — HIGH (ref 10–60)
ANION GAP SERPL CALC-SCNC: 13 MMOL/L — SIGNIFICANT CHANGE UP (ref 5–17)
AST SERPL-CCNC: 53 U/L — HIGH (ref 10–40)
BASOPHILS # BLD AUTO: 0.07 K/UL — SIGNIFICANT CHANGE UP (ref 0–0.2)
BASOPHILS NFR BLD AUTO: 0.4 % — SIGNIFICANT CHANGE UP (ref 0–2)
BILIRUB SERPL-MCNC: 1.1 MG/DL — SIGNIFICANT CHANGE UP (ref 0.2–1.2)
BUN SERPL-MCNC: 90 MG/DL — HIGH (ref 7–18)
CALCIUM SERPL-MCNC: 9.6 MG/DL — SIGNIFICANT CHANGE UP (ref 8.4–10.5)
CHLORIDE SERPL-SCNC: 117 MMOL/L — HIGH (ref 96–108)
CO2 SERPL-SCNC: 16 MMOL/L — LOW (ref 22–31)
CREAT SERPL-MCNC: 3.04 MG/DL — HIGH (ref 0.5–1.3)
EGFR: 14 ML/MIN/1.73M2 — LOW
EOSINOPHIL # BLD AUTO: 0.33 K/UL — SIGNIFICANT CHANGE UP (ref 0–0.5)
EOSINOPHIL NFR BLD AUTO: 2 % — SIGNIFICANT CHANGE UP (ref 0–6)
ESTIMATED AVERAGE GLUCOSE: 148 MG/DL — HIGH (ref 68–114)
GLUCOSE BLDC GLUCOMTR-MCNC: 146 MG/DL — HIGH (ref 70–99)
GLUCOSE BLDC GLUCOMTR-MCNC: 176 MG/DL — HIGH (ref 70–99)
GLUCOSE BLDC GLUCOMTR-MCNC: 195 MG/DL — HIGH (ref 70–99)
GLUCOSE BLDC GLUCOMTR-MCNC: 208 MG/DL — HIGH (ref 70–99)
GLUCOSE BLDC GLUCOMTR-MCNC: 307 MG/DL — HIGH (ref 70–99)
GLUCOSE SERPL-MCNC: 154 MG/DL — HIGH (ref 70–99)
HCT VFR BLD CALC: 39.6 % — SIGNIFICANT CHANGE UP (ref 34.5–45)
HGB BLD-MCNC: 13.4 G/DL — SIGNIFICANT CHANGE UP (ref 11.5–15.5)
IMM GRANULOCYTES NFR BLD AUTO: 1.6 % — HIGH (ref 0–1.5)
LYMPHOCYTES # BLD AUTO: 0.65 K/UL — LOW (ref 1–3.3)
LYMPHOCYTES # BLD AUTO: 3.9 % — LOW (ref 13–44)
MAGNESIUM SERPL-MCNC: 2.4 MG/DL — SIGNIFICANT CHANGE UP (ref 1.6–2.6)
MCHC RBC-ENTMCNC: 32.5 PG — SIGNIFICANT CHANGE UP (ref 27–34)
MCHC RBC-ENTMCNC: 33.8 GM/DL — SIGNIFICANT CHANGE UP (ref 32–36)
MCV RBC AUTO: 96.1 FL — SIGNIFICANT CHANGE UP (ref 80–100)
METHOD TYPE: SIGNIFICANT CHANGE UP
METHOD TYPE: SIGNIFICANT CHANGE UP
MONOCYTES # BLD AUTO: 1.42 K/UL — HIGH (ref 0–0.9)
MONOCYTES NFR BLD AUTO: 8.5 % — SIGNIFICANT CHANGE UP (ref 2–14)
NEUTROPHILS # BLD AUTO: 14.04 K/UL — HIGH (ref 1.8–7.4)
NEUTROPHILS NFR BLD AUTO: 83.6 % — HIGH (ref 43–77)
NRBC # BLD: 0 /100 WBCS — SIGNIFICANT CHANGE UP (ref 0–0)
PHOSPHATE SERPL-MCNC: 4.3 MG/DL — SIGNIFICANT CHANGE UP (ref 2.5–4.5)
PLATELET # BLD AUTO: 198 K/UL — SIGNIFICANT CHANGE UP (ref 150–400)
POTASSIUM SERPL-MCNC: 4.3 MMOL/L — SIGNIFICANT CHANGE UP (ref 3.5–5.3)
POTASSIUM SERPL-SCNC: 4.3 MMOL/L — SIGNIFICANT CHANGE UP (ref 3.5–5.3)
PROT SERPL-MCNC: 5.8 G/DL — LOW (ref 6–8.3)
PROT SERPL-MCNC: 5.8 G/DL — LOW (ref 6–8.3)
PROT SERPL-MCNC: 7 G/DL — SIGNIFICANT CHANGE UP (ref 6–8.3)
RBC # BLD: 4.12 M/UL — SIGNIFICANT CHANGE UP (ref 3.8–5.2)
RBC # FLD: 12.3 % — SIGNIFICANT CHANGE UP (ref 10.3–14.5)
SODIUM SERPL-SCNC: 146 MMOL/L — HIGH (ref 135–145)
WBC # BLD: 16.77 K/UL — HIGH (ref 3.8–10.5)
WBC # FLD AUTO: 16.77 K/UL — HIGH (ref 3.8–10.5)

## 2022-04-11 PROCEDURE — 71045 X-RAY EXAM CHEST 1 VIEW: CPT | Mod: 26

## 2022-04-11 PROCEDURE — 99233 SBSQ HOSP IP/OBS HIGH 50: CPT | Mod: GC

## 2022-04-11 RX ORDER — SODIUM CHLORIDE 9 MG/ML
1000 INJECTION, SOLUTION INTRAVENOUS
Refills: 0 | Status: DISCONTINUED | OUTPATIENT
Start: 2022-04-11 | End: 2022-04-12

## 2022-04-11 RX ORDER — CEFTRIAXONE 500 MG/1
INJECTION, POWDER, FOR SOLUTION INTRAMUSCULAR; INTRAVENOUS
Refills: 0 | Status: DISCONTINUED | OUTPATIENT
Start: 2022-04-11 | End: 2022-04-11

## 2022-04-11 RX ORDER — CEFTRIAXONE 500 MG/1
2000 INJECTION, POWDER, FOR SOLUTION INTRAMUSCULAR; INTRAVENOUS EVERY 24 HOURS
Refills: 0 | Status: DISCONTINUED | OUTPATIENT
Start: 2022-04-12 | End: 2022-04-15

## 2022-04-11 RX ORDER — CEFTRIAXONE 500 MG/1
1000 INJECTION, POWDER, FOR SOLUTION INTRAMUSCULAR; INTRAVENOUS ONCE
Refills: 0 | Status: COMPLETED | OUTPATIENT
Start: 2022-04-11 | End: 2022-04-11

## 2022-04-11 RX ORDER — OLANZAPINE 15 MG/1
5 TABLET, FILM COATED ORAL ONCE
Refills: 0 | Status: COMPLETED | OUTPATIENT
Start: 2022-04-11 | End: 2022-04-11

## 2022-04-11 RX ORDER — AMLODIPINE BESYLATE 2.5 MG/1
10 TABLET ORAL DAILY
Refills: 0 | Status: DISCONTINUED | OUTPATIENT
Start: 2022-04-11 | End: 2022-04-15

## 2022-04-11 RX ADMIN — APIXABAN 2.5 MILLIGRAM(S): 2.5 TABLET, FILM COATED ORAL at 06:46

## 2022-04-11 RX ADMIN — OLANZAPINE 5 MILLIGRAM(S): 15 TABLET, FILM COATED ORAL at 02:44

## 2022-04-11 RX ADMIN — CEFTRIAXONE 100 MILLIGRAM(S): 500 INJECTION, POWDER, FOR SOLUTION INTRAMUSCULAR; INTRAVENOUS at 06:45

## 2022-04-11 RX ADMIN — Medication 1: at 09:05

## 2022-04-11 RX ADMIN — OLANZAPINE 5 MILLIGRAM(S): 15 TABLET, FILM COATED ORAL at 12:27

## 2022-04-11 RX ADMIN — Medication 4: at 12:00

## 2022-04-11 RX ADMIN — CITALOPRAM 20 MILLIGRAM(S): 10 TABLET, FILM COATED ORAL at 12:39

## 2022-04-11 RX ADMIN — CEFTRIAXONE 100 MILLIGRAM(S): 500 INJECTION, POWDER, FOR SOLUTION INTRAMUSCULAR; INTRAVENOUS at 11:50

## 2022-04-11 RX ADMIN — AMLODIPINE BESYLATE 10 MILLIGRAM(S): 2.5 TABLET ORAL at 10:24

## 2022-04-11 RX ADMIN — Medication 1: at 23:00

## 2022-04-11 NOTE — PROGRESS NOTE ADULT - PROBLEM SELECTOR PLAN 9
- noted to have scattered osseous lucencies most prominent in the iliac bones and T11   vertebral body  - ddx: brown tumor  - PTH elevated at 170  - consulted Dr. Hdez today-recommending Vit D levels and ionized Ca

## 2022-04-11 NOTE — PROGRESS NOTE ADULT - ATTENDING COMMENTS
Patient seen and examined on 4/11/22 at approximately 11am. Attempted to communicate with patient via Japanese  #[id number], Rajiv but she is nonsensical and  is unable to understand her. Received Zyprexa 5mg IM overnight for agitation and again this morning for ongoing agitation. Son at bedside who also reports that the patient is confused and states that she always gets confused when she comes to the hospital. Will continue with PRN Zyprexa for now, but if persists, will consider psychiatric evaluation for acute metabolic encephalopathy and hospital acquired delirium. On exam, patient's bladder appears distended, bladder scan checked and patient with >600cc of urine in the bladder, straight cath of greater than 1L. Renal consulted given significant CHAZ on CKD. Will continue bladder scans and straight cath as needed. For GBS bacteremia, patient is on ceftriaxone. Will continue ceftriaxone to cover for bacteremia as well as for E. coli UTI likely caused by urinary retention. Will f/u additional ID recommendations. Patient with brown tumors on CT imaging. Appreciate endo recommendations. Will check vitamin D levels and rule out primary hyperparathyroidism. Remaining care as above. Plan of care discussed with patient's son, Jose, and his spouse.

## 2022-04-11 NOTE — PROGRESS NOTE ADULT - PROBLEM SELECTOR PLAN 1
-Patient with GBS bacteremia in 4/4 bottles   -Source unclear. ?Urine? Patient with known colo-vesicular fistula per discussion with sonJose  -WBC downtrending  -Lactate downtrending  -c/w ceftriaxone  -Follow-up repeat blood cultures collected today

## 2022-04-11 NOTE — PROGRESS NOTE ADULT - PROBLEM SELECTOR PLAN 2
Patient with grossly positive UA  s/p cefepime and NS 4L in ED  UCX (4/9) growing gram neg rods  c/w ceftriaxone-today D2   f/u urine culture sensitivities  ID, Dr. Arnold, consulted

## 2022-04-11 NOTE — PROGRESS NOTE ADULT - PROBLEM SELECTOR PLAN 6
Patient per [employer] is on losartan 100mg daily  Given CHAZ on amlodipine 5mg daily  BP elevated today, increased dose of amlodipine to 10 mg oral daily

## 2022-04-11 NOTE — CONSULT NOTE ADULT - ASSESSMENT
Patient is a 89yo Female with unknown PMH, presents with weakness and fall per ED chart. Admitted for fall found to have UTI and GBS bacteremia. Nephrology consulted for Elevated serum creatinine.    1. CHAZ- unknown baseline SCr; likely hemodynamically mediated in the setting of sepsis/ infection and/or ?obstruction. Active UA in the setting of UTI. Check urine lytes. Pt with distended bladder on exam- will get bladder scan and place acosta if retaining. Recc to change IVF to 1/2 NS @ 75 ml/hr. CT abd/pelvis with no hydro but distended bladder with atrophic left kidney.  Strict I/Os. Avoid nephrotoxins/ NSAIDs/ RCA. Monitor BMP.  2. CKD unspecified- previous SCr 1.6-1.9 in 2018; however no recent blood work for review. CT abd/pelvis with left atrophic kidney. Defer secondary w/u due to advanced age. Avoid nephrotoxins  3. Sepsis- due to Strep agalactia bactremia and Ecoli UTI. Pt on Ceftriaxone. ID following  4. HTN 2/2 CKD- BP elevated. If not retaining urine- Recc to d/c Amlodipine and switch to Nifedipine ER 60mg PO daily, titrate as needed. c/w low salt diet. Monitor BP  5. Acute encephalopathy- CT head neg. Likely due to infection. Plan as per primary team.       [facility] NEPHROLOG[facility]  Gaurav Helms M.D.  Dylan Pimentel D.O.  Sindy Baugh M.D.  Citlaly Bridges, BETH, ANP-C  [phone]    [address] [place] [address]

## 2022-04-11 NOTE — CONSULT NOTE ADULT - SUBJECTIVE AND OBJECTIVE BOX
Patient is a 89y old  Female who presents with a chief complaint of Fall (2022 10:15)      HPI:  Patient is a 89yoF, with unknown PMH, presents with weakness and fall per ED chart. Patient does not speak English, and son is unable to be reached at the time. Voice recording with an instruction to call back was left. Multiple interpreters was called, which includes South African, Faroese, and Croatians. Patient does not speak South African, and Faroese and Croatians interpreters unavailable at this time.  (2022 21:29)      PAST MEDICAL & SURGICAL HISTORY:  DM (diabetes mellitus)    HTN (hypertension)    CAD (coronary artery disease)    Meniere disease    Gout    Diabetes    Hypertension    S/P CABG (coronary artery bypass graft)           MEDICATIONS  (STANDING):  amLODIPine   Tablet 10 milliGRAM(s) Oral daily  apixaban 2.5 milliGRAM(s) Oral two times a day  cefTRIAXone   IVPB 1000 milliGRAM(s) IV Intermittent once  citalopram 20 milliGRAM(s) Oral daily  dextrose 5%. 1000 milliLiter(s) (50 mL/Hr) IV Continuous <Continuous>  dextrose 5%. 1000 milliLiter(s) (100 mL/Hr) IV Continuous <Continuous>  dextrose 50% Injectable 25 Gram(s) IV Push once  dextrose 50% Injectable 12.5 Gram(s) IV Push once  dextrose 50% Injectable 25 Gram(s) IV Push once  glucagon  Injectable 1 milliGRAM(s) IntraMuscular once  insulin lispro (ADMELOG) corrective regimen sliding scale   SubCutaneous Before meals and at bedtime  sodium chloride 0.9%. 1000 milliLiter(s) (75 mL/Hr) IV Continuous <Continuous>    MEDICATIONS  (PRN):  dextrose Oral Gel 15 Gram(s) Oral once PRN Blood Glucose LESS THAN 70 milliGRAM(s)/deciliter      FAMILY HISTORY:  Family history of diabetes mellitus (Father, Mother)        SOCIAL HISTORY:      REVIEW OF SYSTEMS:  CONSTITUTIONAL: No fever, weight loss, or fatigue  EYES: No eye pain, visual disturbances, or discharge  ENT:  No difficulty hearing, tinnitus, vertigo; No sinus or throat pain  NECK: No pain or stiffness  RESPIRATORY: No cough, wheezing, chills or hemoptysis; No Shortness of Breath  CARDIOVASCULAR: No chest pain, palpitations, passing out, dizziness, or leg swelling  GASTROINTESTINAL: No abdominal or epigastric pain. No nausea, vomiting, or hematemesis; No diarrhea or constipation. No melena or hematochezia.  GENITOURINARY: No dysuria, frequency, hematuria, or incontinence  NEUROLOGICAL: No headaches, memory loss, loss of strength, numbness, or tremors  SKIN: No itching, burning, rashes, or lesions   LYMPH Nodes: No enlarged glands  ENDOCRINE: No heat or cold intolerance; No hair loss  MUSCULOSKELETAL: No joint pain or swelling; No muscle, back, or extremity pain  PSYCHIATRIC: No depression, anxiety, mood swings, or difficulty sleeping  HEME/LYMPH: No easy bruising, or bleeding gums  ALLERGY AND IMMUNOLOGIC: No hives or eczema	        Vital Signs Last 24 Hrs  T(C): 37.1 (2022 13:25), Max: 37.2 (10 Apr 2022 21:18)  T(F): 98.7 (2022 13:25), Max: 99 (10 Apr 2022 21:18)  HR: 83 (2022 13:25) (50 - 89)  BP: 154/79 (2022 13:25) (154/79 - 187/57)  BP(mean): 92 (2022 06:30) (92 - 92)  RR: 18 (2022 13:25) (18 - 18)  SpO2: 94% (2022 13:25) (90% - 95%)      Constitutional:    NC/AT:    HEENT:    Neck:  No JVD, bruits or thyromegaly    Respiratory:  Clear without rales or rhonchi    Cardiovascular:  RR without murmur, rub or gallop.    Gastrointestinal: Soft without hepatosplenomegaly.    Extremities: without cyanosis, clubbing or edema.    Neurological:  Oriented   x      . No gross sensory or motor defects.        LABS:                        13.4   16.77 )-----------( 198      ( 2022 06:13 )             39.6     04-11    146<H>  |  117<H>  |  90<H>  ----------------------------<  154<H>  4.3   |  16<L>  |  3.04<H>    Ca    9.6      2022 06:13  Phos  4.3     04-11  Mg     2.4     04-11    TPro  5.8<L>  /  Alb  x   /  TBili  x   /  DBili  x   /  AST  x   /  ALT  x   /  AlkPhos  x             Urinalysis Basic - ( 2022 14:50 )    Color: Yellow / Appearance: Slightly Turbid / S.015 / pH: x  Gluc: x / Ketone: Negative  / Bili: Negative / Urobili: Negative   Blood: x / Protein: 500 mg/dL / Nitrite: Negative   Leuk Esterase: Moderate / RBC: 5-10 /HPF / WBC >50 /HPF   Sq Epi: x / Non Sq Epi: Few /HPF / Bacteria: TNTC /HPF      CAPILLARY BLOOD GLUCOSE      POCT Blood Glucose.: 307 mg/dL (2022 11:06)  POCT Blood Glucose.: 176 mg/dL (2022 08:58)  POCT Blood Glucose.: 125 mg/dL (10 Apr 2022 22:23)  POCT Blood Glucose.: 159 mg/dL (10 Apr 2022 18:27)  POCT Blood Glucose.: 168 mg/dL (10 Apr 2022 16:54)      RADIOLOGY & ADDITIONAL STUDIES: Patient is a 89y old  Female who presents with a chief complaint of Fall (2022 10:15)      HPI:  Patient is a 89yoF, with unknown PMH, presents with weakness and fall per ED chart. Patient does not speak English, and son is unable to be reached at the time. Voice recording with an instruction to call back was left. Multiple interpreters was called, which includes Filipino, Tuvaluan, and Croatians. Patient does not speak Filipino, and Tuvaluan and Croatians interpreters unavailable at this time.  (2022 21:29)      PAST MEDICAL & SURGICAL HISTORY:  DM (diabetes mellitus)    HTN (hypertension)    CAD (coronary artery disease)    Meniere disease    Gout    Diabetes    Hypertension    S/P CABG (coronary artery bypass graft)           MEDICATIONS  (STANDING):  amLODIPine   Tablet 10 milliGRAM(s) Oral daily  apixaban 2.5 milliGRAM(s) Oral two times a day  cefTRIAXone   IVPB 1000 milliGRAM(s) IV Intermittent once  citalopram 20 milliGRAM(s) Oral daily  dextrose 5%. 1000 milliLiter(s) (50 mL/Hr) IV Continuous <Continuous>  dextrose 5%. 1000 milliLiter(s) (100 mL/Hr) IV Continuous <Continuous>  dextrose 50% Injectable 25 Gram(s) IV Push once  dextrose 50% Injectable 12.5 Gram(s) IV Push once  dextrose 50% Injectable 25 Gram(s) IV Push once  glucagon  Injectable 1 milliGRAM(s) IntraMuscular once  insulin lispro (ADMELOG) corrective regimen sliding scale   SubCutaneous Before meals and at bedtime  sodium chloride 0.9%. 1000 milliLiter(s) (75 mL/Hr) IV Continuous <Continuous>    MEDICATIONS  (PRN):  dextrose Oral Gel 15 Gram(s) Oral once PRN Blood Glucose LESS THAN 70 milliGRAM(s)/deciliter      FAMILY HISTORY:  Family history of diabetes mellitus (Father, Mother)        SOCIAL HISTORY:      REVIEW OF SYSTEMS: NA        Vital Signs Last 24 Hrs  T(C): 37.1 (2022 13:25), Max: 37.2 (10 Apr 2022 21:18)  T(F): 98.7 (2022 13:25), Max: 99 (10 Apr 2022 21:18)  HR: 83 (2022 13:25) (50 - 89)  BP: 154/79 (2022 13:25) (154/79 - 187/57)  BP(mean): 92 (2022 06:30) (92 - 92)  RR: 18 (2022 13:25) (18 - 18)  SpO2: 94% (2022 13:25) (90% - 95%)      Constitutional:      HEENT: nad    Neck:  No JVD, bruits or thyromegaly    Respiratory:  Clear without rales or rhonchi    Cardiovascular:  RR without murmur, rub or gallop.    Gastrointestinal: Soft without hepatosplenomegaly.    Extremities: without cyanosis, clubbing or edema.    Neurological:  Oriented   x      . No gross sensory or motor defects.        LABS:                        13.4   16.77 )-----------( 198      ( 2022 06:13 )             39.6     04-11    146<H>  |  117<H>  |  90<H>  ----------------------------<  154<H>  4.3   |  16<L>  |  3.04<H>    Ca    9.6      2022 06:13  Phos  4.3     04-11  Mg     2.4     04-11    TPro  5.8<L>  /  Alb  x   /  TBili  x   /  DBili  x   /  AST  x   /  ALT  x   /  AlkPhos  x   04-11          Urinalysis Basic - ( 2022 14:50 )    Color: Yellow / Appearance: Slightly Turbid / S.015 / pH: x  Gluc: x / Ketone: Negative  / Bili: Negative / Urobili: Negative   Blood: x / Protein: 500 mg/dL / Nitrite: Negative   Leuk Esterase: Moderate / RBC: 5-10 /HPF / WBC >50 /HPF   Sq Epi: x / Non Sq Epi: Few /HPF / Bacteria: TNTC /HPF      CAPILLARY BLOOD GLUCOSE      POCT Blood Glucose.: 307 mg/dL (2022 11:06)  POCT Blood Glucose.: 176 mg/dL (2022 08:58)  POCT Blood Glucose.: 125 mg/dL (10 Apr 2022 22:23)  POCT Blood Glucose.: 159 mg/dL (10 Apr 2022 18:27)  POCT Blood Glucose.: 168 mg/dL (10 Apr 2022 16:54)      RADIOLOGY & ADDITIONAL STUDIES:

## 2022-04-11 NOTE — PROGRESS NOTE ADULT - SUBJECTIVE AND OBJECTIVE BOX
PGY-1 Progress Note discussed with attending    PAGER #: [[phone]] TILL 5:00 PM  PLEASE CONTACT ON CALL TEAM:  - On Call Team (Please refer to Migue) FROM 5:00 PM - 8:30PM  - Nightfloat Team FROM 8:30 -7:30 AM        INTERVAL HPI/OVERNIGHT EVENTS:       REVIEW OF SYSTEMS:  CONSTITUTIONAL: No fever, weight loss, or fatigue  RESPIRATORY: No cough, wheezing, chills or hemoptysis; No shortness of breath  CARDIOVASCULAR: No chest pain, palpitations, dizziness, or leg swelling  GASTROINTESTINAL: No abdominal pain. No nausea, vomiting, or hematemesis; No diarrhea or constipation. No melena or hematochezia.  GENITOURINARY: No dysuria or hematuria, urinary frequency  NEUROLOGICAL: No headaches, memory loss, loss of strength, numbness, or tremors  SKIN: No itching, burning, rashes, or lesions     Vital Signs Last 24 Hrs  T(C): 36.8 (11 Apr 2022 06:30), Max: 37.2 (10 Apr 2022 21:18)  T(F): 98.3 (11 Apr 2022 06:30), Max: 99 (10 Apr 2022 21:18)  HR: 50 (11 Apr 2022 06:30) (50 - 90)  BP: 187/57 (11 Apr 2022 06:30) (149/72 - 187/57)  BP(mean): 92 (11 Apr 2022 06:30) (92 - 92)  RR: 18 (11 Apr 2022 06:30) (18 - 18)  SpO2: 90% (11 Apr 2022 06:30) (90% - 95%)    PHYSICAL EXAMINATION:  GENERAL: NAD, well built  HEAD:  Atraumatic, Normocephalic  EYES:  conjunctiva and sclera clear  NECK: Supple, No JVD, Normal thyroid  CHEST/LUNG: Clear to auscultation. Clear to percussion bilaterally; No rales, rhonchi, wheezing, or rubs  HEART: Regular rate and rhythm; No murmurs, rubs, or gallops  ABDOMEN: Soft, Nontender, Nondistended; Bowel sounds present  NERVOUS SYSTEM:  Alert & Oriented X3,    EXTREMITIES:  2+ Peripheral Pulses, No clubbing, cyanosis, or edema  SKIN: warm dry                          13.4   16.77 )-----------( 198      ( 11 Apr 2022 06:13 )             39.6     04-11    146<H>  |  117<H>  |  90<H>  ----------------------------<  154<H>  4.3   |  16<L>  |  3.04<H>    Ca    9.6      11 Apr 2022 06:13  Phos  4.3     04-11  Mg     2.4     04-11    TPro  5.8<L>  /  Alb  x   /  TBili  x   /  DBili  x   /  AST  x   /  ALT  x   /  AlkPhos  x   04-11    LIVER FUNCTIONS - ( 11 Apr 2022 09:31 )  Alb: x     / Pro: 5.8 g/dL / ALK PHOS: x     / ALT: x     / AST: x     / GGT: x                   CAPILLARY BLOOD GLUCOSE      RADIOLOGY & ADDITIONAL TESTS:                   PGY-1 Progress Note discussed with attending    PAGER #: [[phone]] TILL 5:00 PM  PLEASE CONTACT ON CALL TEAM:  - On Call Team (Please refer to Migue) FROM 5:00 PM - 8:30PM  - Nightfloat Team FROM 8:30 -7:30 AM    INTERVAL HPI/OVERNIGHT EVENTS:  Pt received 5 mg of IM Zyprexa at 2 AM this morning. Evaluated pt at bedside, conversation translated by pacific  Uzbek speaker Rossy ID# [id number]. As per , pt not making sense and  not able to understand her. Pt was also agitated, pushing the phone away. Given her MS cannot assess for ROS      Vital Signs Last 24 Hrs  T(C): 36.8 (11 Apr 2022 06:30), Max: 37.2 (10 Apr 2022 21:18)  T(F): 98.3 (11 Apr 2022 06:30), Max: 99 (10 Apr 2022 21:18)  HR: 50 (11 Apr 2022 06:30) (50 - 90)  BP: 187/57 (11 Apr 2022 06:30) (149/72 - 187/57)  BP(mean): 92 (11 Apr 2022 06:30) (92 - 92)  RR: 18 (11 Apr 2022 06:30) (18 - 18)  SpO2: 90% (11 Apr 2022 06:30) (90% - 95%)    PHYSICAL EXAMINATION:  GENERAL: NAD, well built  HEAD:  Atraumatic, Normocephalic  EYES:  conjunctiva and sclera clear  NECK: Supple, No JVD  CHEST/LUNG: Clear to auscultation.; No rales, rhonchi, wheezing, or rubs  HEART: Regular rate and rhythm; No murmurs, rubs, or gallops  ABDOMEN: Soft, Nontender, Nondistended; Bowel sounds present  NERVOUS SYSTEM:  Alert & Oriented X1,    EXTREMITIES:  2+ Peripheral Pulses, No clubbing, cyanosis, or edema  SKIN: warm dry                          13.4   16.77 )-----------( 198      ( 11 Apr 2022 06:13 )             39.6     04-11    146<H>  |  117<H>  |  90<H>  ----------------------------<  154<H>  4.3   |  16<L>  |  3.04<H>    Ca    9.6      11 Apr 2022 06:13  Phos  4.3     04-11  Mg     2.4     04-11    TPro  5.8<L>  /  Alb  x   /  TBili  x   /  DBili  x   /  AST  x   /  ALT  x   /  AlkPhos  x   04-11    LIVER FUNCTIONS - ( 11 Apr 2022 09:31 )  Alb: x     / Pro: 5.8 g/dL / ALK PHOS: x     / ALT: x     / AST: x     / GGT: x                   CAPILLARY BLOOD GLUCOSE      RADIOLOGY & ADDITIONAL TESTS:

## 2022-04-11 NOTE — PROGRESS NOTE ADULT - PROBLEM SELECTOR PLAN 5
Patient with chronic atrial fibrillation per chart review with [id number] per prior cardiology note. Was admitted to [facility] in 2018 for acute CVA  Continue Eliquis 2.5mg po BID  Does not appear to currently be on a BB, so far not requiring rate controlled medication

## 2022-04-11 NOTE — CONSULT NOTE ADULT - ASSESSMENT
#BROWN TUMOR  Patient with unknown hx of hyperparathyroidism  Elevated PTHi and Ca corrected to albumin 10.7  R/o Vitamin D deficiency   Please send vitamin D and ionized calcium   Will follow up with results for further management     #DM   Patient with A1c 6.8 with unknown meds at home   A1c at target   Recommend to c/w diabetic diet  FS before meals and at bedtime, target 140-180  C/w HSS   #BROWN TUMOR  Elevated PTHi and Ca corrected to albumin 10.7  R/o prim hyperparathyroidism  R/o Vitamin D deficiency   Please send vitamin D and ionized calcium   Will follow up with results for further management     #DM   Patient with A1c 6.8 with unknown meds at home   A1c at target   Recommend to c/w diabetic diet  FS before meals and at bedtime, target 140-180  C/w HSS

## 2022-04-11 NOTE — CONSULT NOTE ADULT - SUBJECTIVE AND OBJECTIVE BOX
[facility] NEPHROLOGY- CONSULTATION NOTE    Patient is a 89yo Female with unknown PMH, presents with weakness and fall per ED chart. Admitted for fall found to have UTI and GBS bacteremia. Nephrology consulted for Elevated serum creatinine.    Used Belarusian  # [id number]; unable to comprehend pt due to mumbling/ incoherent speech      PAST MEDICAL & SURGICAL HISTORY:  DM (diabetes mellitus)    HTN (hypertension)    CAD (coronary artery disease)    Meniere disease    Gout    Diabetes    Hypertension    S/P CABG (coronary artery bypass graft)      No Known Allergies    Home Medications Reviewed  Hospital Medications:   MEDICATIONS  (STANDING):  amLODIPine   Tablet 10 milliGRAM(s) Oral daily  apixaban 2.5 milliGRAM(s) Oral two times a day  cefTRIAXone   IVPB 1000 milliGRAM(s) IV Intermittent once  citalopram 20 milliGRAM(s) Oral daily  dextrose 5%. 1000 milliLiter(s) (50 mL/Hr) IV Continuous <Continuous>  dextrose 5%. 1000 milliLiter(s) (100 mL/Hr) IV Continuous <Continuous>  dextrose 50% Injectable 25 Gram(s) IV Push once  dextrose 50% Injectable 12.5 Gram(s) IV Push once  dextrose 50% Injectable 25 Gram(s) IV Push once  glucagon  Injectable 1 milliGRAM(s) IntraMuscular once  insulin lispro (ADMELOG) corrective regimen sliding scale   SubCutaneous Before meals and at bedtime  sodium chloride 0.9%. 1000 milliLiter(s) (75 mL/Hr) IV Continuous <Continuous>    FAMILY HISTORY:  Family history of diabetes mellitus (Father, Mother)        REVIEW OF SYSTEMS: Unable to obtain due to AMS    VITALS:  T(F): 98.7 (22 @ 13:25), Max: 99 (04-10-22 @ 21:18)  HR: 83 (22 @ 13:25)  BP: 154/79 (22 @ 13:25)  RR: 18 (22 @ 13:25)  SpO2: 94% (22 @ 13:25)  Wt(kg): --      PHYSICAL EXAM:  Gen: Altered  HEENT: dry MM  Neck: no JVD  Cards: RRR, +S1/S2, no M/G/R  Resp: CTA B/L  GI: soft, +mild distention  : +distended bladder  Extremities: no LE edema B/L  Derm: no rashes  Neuro: confused    LABS:      146<H>  |  117<H>  |  90<H>  ----------------------------<  154<H>  4.3   |  16<L>  |  3.04<H>    Ca    9.6      2022 06:13  Phos  4.3       Mg     2.4         TPro  5.8<L>  /  Alb      /  TBili      /  DBili      /  AST      /  ALT      /  AlkPhos          Creatinine Trend: 3.04 <--, 2.94 <--, 3.54 <--                        13.4   16.77 )-----------( 198      ( 2022 06:13 )             39.6     Urine Studies:  Urinalysis Basic - ( 2022 14:50 )    Color: Yellow / Appearance: Slightly Turbid / S.015 / pH:   Gluc:  / Ketone: Negative  / Bili: Negative / Urobili: Negative   Blood:  / Protein: 500 mg/dL / Nitrite: Negative   Leuk Esterase: Moderate / RBC: 5-10 /HPF / WBC >50 /HPF   Sq Epi:  / Non Sq Epi: Few /HPF / Bacteria: TNTC /HPF        RADIOLOGY & ADDITIONAL STUDIES:      < from: CT Head No Cont (22 @ 18:37) >    ACC: [id number] EXAM:  CT BRAIN                          PROCEDURE DATE:  2022        < end of copied text >  < from: CT Head No Cont (22 @ 18:37) >    IMPRESSION:    No evidence for calvarial fracture or acute intracranial hemorrhage. If   the patient has new and persistent symptoms, consider short interval   follow-up head CT or brain MRI follow-up if there are no MRI   contraindications.    --- End of Report ---            ETN MEDINA MD; Attending Radiologist  This document has been electronically signed. 2022  7:05PM    < end of copied text >  < from: CT Abdomen and Pelvis No Cont (22 @ 18:39) >    ACC: [id number] EXAM:  CT ABDOMEN AND PELVIS                          PROCEDURE DATE:  2022      < end of copied text >      < from: CT Abdomen and Pelvis No Cont (22 @ 18:39) >  KIDNEYS/URETERS: The right kidneyis normal in size without   hydronephrosis or stones.    The left kidney is markedly atrophic with associated coarse   calcifications. There is no hydronephrosis.    BLADDER: The bladder is distended. Moderate amount of air is noted within   the bladder, likely related to recent instrumentation. Clinical   correlation is recommended.    < end of copied text >

## 2022-04-11 NOTE — PROGRESS NOTE ADULT - PROBLEM SELECTOR PLAN 4
Pt with urinary retention  Bladder scan showed ~600 cc urine  straight cath ~3PM today, obtaining 1000 cc of urine  next bladder scan @ 9PM  straight cath as needed

## 2022-04-11 NOTE — PROGRESS NOTE ADULT - PROBLEM SELECTOR PLAN 8
- noted to have Cr 3.54 (unknown baseline)  - s/p NS 4L bolus in ED  - CT A/P atrophic (L) Kidney  - SCr 2.9>3.04 today (worse)  - f/u u-elytes

## 2022-04-11 NOTE — PROGRESS NOTE ADULT - PROBLEM SELECTOR PLAN 3
Pt AAOx 3 at home  able to do some ADL on her own  CTH negative   likely toxic-metabolic in the s/o UTI  AAOX1 today  with episodes of agitation  received zyprexa today 5 mg IM today  if pt continues getting agitated ok to give zyprexa 2.5 mg IM; avoid haldol given QTc>500 msec   if pt continues to be agitated tomorrow, will consult Dr. Thomas

## 2022-04-12 DIAGNOSIS — E87.0 HYPEROSMOLALITY AND HYPERNATREMIA: ICD-10-CM

## 2022-04-12 DIAGNOSIS — F03.90 UNSPECIFIED DEMENTIA WITHOUT BEHAVIORAL DISTURBANCE: ICD-10-CM

## 2022-04-12 LAB
-  AMIKACIN: SIGNIFICANT CHANGE UP
-  AMOXICILLIN/CLAVULANIC ACID: SIGNIFICANT CHANGE UP
-  AMPICILLIN/SULBACTAM: SIGNIFICANT CHANGE UP
-  AMPICILLIN: SIGNIFICANT CHANGE UP
-  AZTREONAM: SIGNIFICANT CHANGE UP
-  CEFAZOLIN: SIGNIFICANT CHANGE UP
-  CEFEPIME: SIGNIFICANT CHANGE UP
-  CEFOXITIN: SIGNIFICANT CHANGE UP
-  CEFTRIAXONE: SIGNIFICANT CHANGE UP
-  CIPROFLOXACIN: SIGNIFICANT CHANGE UP
-  ERTAPENEM: SIGNIFICANT CHANGE UP
-  GENTAMICIN: SIGNIFICANT CHANGE UP
-  IMIPENEM: SIGNIFICANT CHANGE UP
-  LEVOFLOXACIN: SIGNIFICANT CHANGE UP
-  MEROPENEM: SIGNIFICANT CHANGE UP
-  NITROFURANTOIN: SIGNIFICANT CHANGE UP
-  PENICILLIN: SIGNIFICANT CHANGE UP
-  PIPERACILLIN/TAZOBACTAM: SIGNIFICANT CHANGE UP
-  TIGECYCLINE: SIGNIFICANT CHANGE UP
-  TOBRAMYCIN: SIGNIFICANT CHANGE UP
-  TRIMETHOPRIM/SULFAMETHOXAZOLE: SIGNIFICANT CHANGE UP
24R-OH-CALCIDIOL SERPL-MCNC: 19.2 NG/ML — LOW (ref 30–80)
ALBUMIN SERPL ELPH-MCNC: 2 G/DL — LOW (ref 3.5–5)
ALP SERPL-CCNC: 64 U/L — SIGNIFICANT CHANGE UP (ref 40–120)
ALT FLD-CCNC: 47 U/L DA — SIGNIFICANT CHANGE UP (ref 10–60)
ANION GAP SERPL CALC-SCNC: 11 MMOL/L — SIGNIFICANT CHANGE UP (ref 5–17)
ANION GAP SERPL CALC-SCNC: 7 MMOL/L — SIGNIFICANT CHANGE UP (ref 5–17)
AST SERPL-CCNC: 26 U/L — SIGNIFICANT CHANGE UP (ref 10–40)
BILIRUB SERPL-MCNC: 0.8 MG/DL — SIGNIFICANT CHANGE UP (ref 0.2–1.2)
BUN SERPL-MCNC: 94 MG/DL — HIGH (ref 7–18)
BUN SERPL-MCNC: 96 MG/DL — HIGH (ref 7–18)
CALCIUM SERPL-MCNC: 9.2 MG/DL — SIGNIFICANT CHANGE UP (ref 8.4–10.5)
CALCIUM SERPL-MCNC: 9.2 MG/DL — SIGNIFICANT CHANGE UP (ref 8.4–10.5)
CHLORIDE SERPL-SCNC: 121 MMOL/L — HIGH (ref 96–108)
CHLORIDE SERPL-SCNC: 124 MMOL/L — HIGH (ref 96–108)
CHLORIDE UR-SCNC: 67 MMOL/L — SIGNIFICANT CHANGE UP
CK SERPL-CCNC: 179 U/L — SIGNIFICANT CHANGE UP (ref 21–215)
CO2 SERPL-SCNC: 16 MMOL/L — LOW (ref 22–31)
CO2 SERPL-SCNC: 21 MMOL/L — LOW (ref 22–31)
CREAT ?TM UR-MCNC: 39 MG/DL — SIGNIFICANT CHANGE UP
CREAT SERPL-MCNC: 2.93 MG/DL — HIGH (ref 0.5–1.3)
CREAT SERPL-MCNC: 3.11 MG/DL — HIGH (ref 0.5–1.3)
CULTURE RESULTS: SIGNIFICANT CHANGE UP
EGFR: 14 ML/MIN/1.73M2 — LOW
EGFR: 15 ML/MIN/1.73M2 — LOW
GLUCOSE BLDC GLUCOMTR-MCNC: 140 MG/DL — HIGH (ref 70–99)
GLUCOSE BLDC GLUCOMTR-MCNC: 145 MG/DL — HIGH (ref 70–99)
GLUCOSE BLDC GLUCOMTR-MCNC: 159 MG/DL — HIGH (ref 70–99)
GLUCOSE BLDC GLUCOMTR-MCNC: 255 MG/DL — HIGH (ref 70–99)
GLUCOSE SERPL-MCNC: 142 MG/DL — HIGH (ref 70–99)
GLUCOSE SERPL-MCNC: 178 MG/DL — HIGH (ref 70–99)
HCT VFR BLD CALC: 38.3 % — SIGNIFICANT CHANGE UP (ref 34.5–45)
HGB BLD-MCNC: 12.6 G/DL — SIGNIFICANT CHANGE UP (ref 11.5–15.5)
MAGNESIUM SERPL-MCNC: 2.5 MG/DL — SIGNIFICANT CHANGE UP (ref 1.6–2.6)
MCHC RBC-ENTMCNC: 32.3 PG — SIGNIFICANT CHANGE UP (ref 27–34)
MCHC RBC-ENTMCNC: 32.9 GM/DL — SIGNIFICANT CHANGE UP (ref 32–36)
MCV RBC AUTO: 98.2 FL — SIGNIFICANT CHANGE UP (ref 80–100)
METHOD TYPE: SIGNIFICANT CHANGE UP
NRBC # BLD: 0 /100 WBCS — SIGNIFICANT CHANGE UP (ref 0–0)
ORGANISM # SPEC MICROSCOPIC CNT: SIGNIFICANT CHANGE UP
PHOSPHATE SERPL-MCNC: 4.3 MG/DL — SIGNIFICANT CHANGE UP (ref 2.5–4.5)
PLATELET # BLD AUTO: 183 K/UL — SIGNIFICANT CHANGE UP (ref 150–400)
POTASSIUM SERPL-MCNC: 3.9 MMOL/L — SIGNIFICANT CHANGE UP (ref 3.5–5.3)
POTASSIUM SERPL-MCNC: 4.1 MMOL/L — SIGNIFICANT CHANGE UP (ref 3.5–5.3)
POTASSIUM SERPL-SCNC: 3.9 MMOL/L — SIGNIFICANT CHANGE UP (ref 3.5–5.3)
POTASSIUM SERPL-SCNC: 4.1 MMOL/L — SIGNIFICANT CHANGE UP (ref 3.5–5.3)
PROT SERPL-MCNC: 6.1 G/DL — SIGNIFICANT CHANGE UP (ref 6–8.3)
RBC # BLD: 3.9 M/UL — SIGNIFICANT CHANGE UP (ref 3.8–5.2)
RBC # FLD: 12.6 % — SIGNIFICANT CHANGE UP (ref 10.3–14.5)
SODIUM SERPL-SCNC: 149 MMOL/L — HIGH (ref 135–145)
SODIUM SERPL-SCNC: 151 MMOL/L — HIGH (ref 135–145)
SODIUM UR-SCNC: 68 MMOL/L — SIGNIFICANT CHANGE UP
SPECIMEN SOURCE: SIGNIFICANT CHANGE UP
UUN UR-MCNC: 639 MG/DL — SIGNIFICANT CHANGE UP
WBC # BLD: 11.46 K/UL — HIGH (ref 3.8–10.5)
WBC # FLD AUTO: 11.46 K/UL — HIGH (ref 3.8–10.5)

## 2022-04-12 PROCEDURE — 99233 SBSQ HOSP IP/OBS HIGH 50: CPT | Mod: GC

## 2022-04-12 PROCEDURE — 90792 PSYCH DIAG EVAL W/MED SRVCS: CPT

## 2022-04-12 RX ORDER — OLANZAPINE 15 MG/1
2.5 TABLET, FILM COATED ORAL AT BEDTIME
Refills: 0 | Status: DISCONTINUED | OUTPATIENT
Start: 2022-04-12 | End: 2022-04-15

## 2022-04-12 RX ORDER — ERGOCALCIFEROL 1.25 MG/1
50000 CAPSULE ORAL
Refills: 0 | Status: DISCONTINUED | OUTPATIENT
Start: 2022-04-12 | End: 2022-04-15

## 2022-04-12 RX ORDER — OLANZAPINE 15 MG/1
2.5 TABLET, FILM COATED ORAL ONCE
Refills: 0 | Status: COMPLETED | OUTPATIENT
Start: 2022-04-12 | End: 2022-04-12

## 2022-04-12 RX ORDER — SODIUM CHLORIDE 9 MG/ML
1000 INJECTION, SOLUTION INTRAVENOUS
Refills: 0 | Status: DISCONTINUED | OUTPATIENT
Start: 2022-04-12 | End: 2022-04-13

## 2022-04-12 RX ORDER — METRONIDAZOLE 500 MG
500 TABLET ORAL EVERY 8 HOURS
Refills: 0 | Status: DISCONTINUED | OUTPATIENT
Start: 2022-04-12 | End: 2022-04-14

## 2022-04-12 RX ADMIN — APIXABAN 2.5 MILLIGRAM(S): 2.5 TABLET, FILM COATED ORAL at 06:36

## 2022-04-12 RX ADMIN — SODIUM CHLORIDE 75 MILLILITER(S): 9 INJECTION, SOLUTION INTRAVENOUS at 00:06

## 2022-04-12 RX ADMIN — Medication 100 MILLIGRAM(S): at 21:52

## 2022-04-12 RX ADMIN — Medication 3: at 12:30

## 2022-04-12 RX ADMIN — ERGOCALCIFEROL 50000 UNIT(S): 1.25 CAPSULE ORAL at 18:52

## 2022-04-12 RX ADMIN — APIXABAN 2.5 MILLIGRAM(S): 2.5 TABLET, FILM COATED ORAL at 18:53

## 2022-04-12 RX ADMIN — AMLODIPINE BESYLATE 10 MILLIGRAM(S): 2.5 TABLET ORAL at 06:38

## 2022-04-12 RX ADMIN — Medication 100 MILLIGRAM(S): at 16:48

## 2022-04-12 RX ADMIN — OLANZAPINE 2.5 MILLIGRAM(S): 15 TABLET, FILM COATED ORAL at 21:51

## 2022-04-12 RX ADMIN — CEFTRIAXONE 100 MILLIGRAM(S): 500 INJECTION, POWDER, FOR SOLUTION INTRAMUSCULAR; INTRAVENOUS at 09:15

## 2022-04-12 RX ADMIN — CITALOPRAM 20 MILLIGRAM(S): 10 TABLET, FILM COATED ORAL at 12:46

## 2022-04-12 RX ADMIN — OLANZAPINE 2.5 MILLIGRAM(S): 15 TABLET, FILM COATED ORAL at 03:22

## 2022-04-12 RX ADMIN — Medication 1: at 22:11

## 2022-04-12 NOTE — PROGRESS NOTE ADULT - SUBJECTIVE AND OBJECTIVE BOX
[facility] NEPHROLOGY- PROGRESS NOTE    Patient is a 89yo Female with unknown PMH, presents with weakness and fall per ED chart. Admitted for fall found to have UTI and GBS bacteremia. Nephrology consulted for Elevated serum creatinine.    Hospital Medications: Medications reviewed.  REVIEW OF SYSTEMS: Limited; more alert but some speech is incoherent and not answering questions appropriately  Pt not answering if she is SOB. Pt c/o of being thirsty. Feels uncomfortable and the need to urinate (has acosta)    VITALS:  T(F): 97.6 (22 @ 05:30), Max: 98.7 (22 @ 13:25)  HR: 81 (22 @ 05:30)  BP: 149/62 (22 @ 05:30)  RR: 17 (22 @ 05:30)  SpO2: 96% (22 @ 05:30)  Wt(kg): --  Height (cm): 167.6 ( 15:37)  Weight (kg): 75 (04-09 @ 15:37)  BMI (kg/m2): 26.7 (04-09 @ 15:37)  BSA (m2): 1.84 ( 15:37)     @ 07:01  -   @ 07:00  --------------------------------------------------------  IN: 0 mL / OUT: 1200 mL / NET: -1200 mL      PHYSICAL EXAM:  Constitutional: NAD  Neurological: Awake more alert but still mumbling at times  HEENT: anicteric sclera,   Respiratory: CTA ant  Cardiovascular: S1, S2, RRR  Gastrointestinal: BS+, soft, NT/ND  : +acosta with clear urine  Extremities: No peripheral edema    LABS:    151 <--, 146 <--, 143 <--, 138 <--  151<H>  |  124<H>  |  94<H>  ----------------------------<  142<H>  3.9   |  16<L>  |  3.11<H>    Ca    9.2      2022 06:39  Phos  4.3     -  Mg     2.5     -12    TPro  6.1  /  Alb  2.0<L>  /  TBili  0.8  /  DBili      /  AST  26  /  ALT  47  /  AlkPhos  64  -    Creatinine Trend: 3.11 <--, 3.04 <--, 2.94 <--, 3.54 <--                        12.6   11.46 )-----------( 183      ( 2022 06:39 )             38.3     Urine Studies:  Urinalysis Basic - ( 2022 14:50 )    Color: Yellow / Appearance: Slightly Turbid / S.015 / pH:   Gluc:  / Ketone: Negative  / Bili: Negative / Urobili: Negative   Blood:  / Protein: 500 mg/dL / Nitrite: Negative   Leuk Esterase: Moderate / RBC: 5-10 /HPF / WBC >50 /HPF   Sq Epi:  / Non Sq Epi: Few /HPF / Bacteria: TNTC /HPF      Sodium, Random Urine: 68 mmol/L ( @ 02:18)  Creatinine, Random Urine: 39 mg/dL ( @ 02:18)  Chloride, Random Urine: 67 mmol/L ( @ 02:18)    RADIOLOGY & ADDITIONAL STUDIES:

## 2022-04-12 NOTE — PROGRESS NOTE ADULT - PROBLEM SELECTOR PLAN 1
-Patient with GBS bacteremia in 4/4 bottles   -Source unclear. ?Urine? Patient with known colo-vesicular fistula per discussion with son, Jose  -May be contaminant as per ID  -Lactate 2.5->1.5  -WBC downtrending  -c/w ceftriaxone  -F/u repeat blood cultures collected

## 2022-04-12 NOTE — PROGRESS NOTE ADULT - SUBJECTIVE AND OBJECTIVE BOX
PGY-1 Progress Note discussed with attending    PAGER #: [[phone]] TILL 5:00 PM  PLEASE CONTACT ON CALL TEAM:  - On Call Team (Please refer to Migue) FROM 5:00 PM - 8:30PM  - Nightfloat Team FROM 8:30 -7:30 AM    CHIEF COMPLAINT & BRIEF HOSPITAL COURSE:    INTERVAL HPI/OVERNIGHT EVENTS:       REVIEW OF SYSTEMS:  CONSTITUTIONAL: No fever, weight loss, or fatigue  RESPIRATORY: No cough, wheezing, chills or hemoptysis; No shortness of breath  CARDIOVASCULAR: No chest pain, palpitations, dizziness, or leg swelling  GASTROINTESTINAL: No abdominal pain. No nausea, vomiting, or hematemesis; No diarrhea or constipation. No melena or hematochezia.  GENITOURINARY: No dysuria or hematuria, urinary frequency  NEUROLOGICAL: No headaches, memory loss, loss of strength, numbness, or tremors  SKIN: No itching, burning, rashes, or lesions     Vital Signs Last 24 Hrs  T(C): 36.4 (12 Apr 2022 05:30), Max: 37.1 (11 Apr 2022 13:25)  T(F): 97.6 (12 Apr 2022 05:30), Max: 98.7 (11 Apr 2022 13:25)  HR: 81 (12 Apr 2022 05:30) (81 - 97)  BP: 149/62 (12 Apr 2022 05:30) (124/87 - 154/79)  BP(mean): --  RR: 17 (12 Apr 2022 05:30) (16 - 18)  SpO2: 96% (12 Apr 2022 05:30) (94% - 97%)    PHYSICAL EXAMINATION:  GENERAL: NAD, well built  HEAD:  Atraumatic, Normocephalic  EYES:  conjunctiva and sclera clear  NECK: Supple, No JVD, Normal thyroid  CHEST/LUNG: Clear to auscultation. Clear to percussion bilaterally; No rales, rhonchi, wheezing, or rubs  HEART: Regular rate and rhythm; No murmurs, rubs, or gallops  ABDOMEN: Soft, Nontender, Nondistended; Bowel sounds present  NERVOUS SYSTEM:  Alert & Oriented X3,    EXTREMITIES:  2+ Peripheral Pulses, No clubbing, cyanosis, or edema  SKIN: warm dry                          12.6   11.46 )-----------( 183      ( 12 Apr 2022 06:39 )             38.3     04-12    151<H>  |  124<H>  |  94<H>  ----------------------------<  142<H>  3.9   |  16<L>  |  3.11<H>    Ca    9.2      12 Apr 2022 06:39  Phos  4.3     04-12  Mg     2.5     04-12    TPro  6.1  /  Alb  2.0<L>  /  TBili  0.8  /  DBili  x   /  AST  26  /  ALT  47  /  AlkPhos  64  04-12    LIVER FUNCTIONS - ( 12 Apr 2022 06:39 )  Alb: 2.0 g/dL / Pro: 6.1 g/dL / ALK PHOS: 64 U/L / ALT: 47 U/L DA / AST: 26 U/L / GGT: x           CARDIAC MARKERS ( 12 Apr 2022 06:39 )  x     / x     / 179 U/L / x     / x              CAPILLARY BLOOD GLUCOSE      RADIOLOGY & ADDITIONAL TESTS:                   PGY-1 Progress Note discussed with attending    PAGER #: [[phone]] TILL 5:00 PM  PLEASE CONTACT ON CALL TEAM:  - On Call Team (Please refer to Migue) FROM 5:00 PM - 8:30PM  - Nightfloat Team FROM 8:30 -7:30 AM        INTERVAL HPI/OVERNIGHT EVENTS:  Pt received Zyprexa 2.5 mg IM overnight for agitation. Also had Acosta catheter placed by the covering team. Pt seen and examined at bedside, assisted by [facility]  ID # [id number], however, pt did not interact with  and  could not understand what the pt was saying. I reassessed her later in the morning, pt talking to me in English, alert and interacting, voicing no major complaint, however speech still slightly incoherent.       REVIEW OF SYSTEMS:  CONSTITUTIONAL: No fever, weight loss, or fatigue  RESPIRATORY: No cough, wheezing, chills or hemoptysis; No shortness of breath  CARDIOVASCULAR: No chest pain, palpitations, dizziness, or leg swelling  GASTROINTESTINAL: No abdominal pain. No nausea, vomiting, or hematemesis; No diarrhea or constipation. No melena or hematochezia.  GENITOURINARY: No dysuria or hematuria, urinary frequency  NEUROLOGICAL: No headaches, memory loss, loss of strength, numbness, or tremors  SKIN: No itching, burning, rashes, or lesions     Vital Signs Last 24 Hrs  T(C): 36.4 (12 Apr 2022 05:30), Max: 37.1 (11 Apr 2022 13:25)  T(F): 97.6 (12 Apr 2022 05:30), Max: 98.7 (11 Apr 2022 13:25)  HR: 81 (12 Apr 2022 05:30) (81 - 97)  BP: 149/62 (12 Apr 2022 05:30) (124/87 - 154/79)  BP(mean): --  RR: 17 (12 Apr 2022 05:30) (16 - 18)  SpO2: 96% (12 Apr 2022 05:30) (94% - 97%)    PHYSICAL EXAMINATION:  GENERAL: NAD, well built  HEAD:  Atraumatic, Normocephalic  EYES:  conjunctiva and sclera clear  NECK: Supple, No JVD  CHEST/LUNG: Clear to auscultation.; occasinal crackles, no rhonchi, wheezing, or rubs  HEART: Regular rate and rhythm; No murmurs, rubs, or gallops  ABDOMEN: Soft, Nontender, Nondistended; Bowel sounds present  : acosta catheter draining clear urine  NERVOUS SYSTEM:  Alert & Oriented X1,    EXTREMITIES:  2+ Peripheral Pulses, No clubbing, cyanosis, or edema  SKIN: warm dry                              12.6   11.46 )-----------( 183      ( 12 Apr 2022 06:39 )             38.3     04-12    151<H>  |  124<H>  |  94<H>  ----------------------------<  142<H>  3.9   |  16<L>  |  3.11<H>    Ca    9.2      12 Apr 2022 06:39  Phos  4.3     04-12  Mg     2.5     04-12    TPro  6.1  /  Alb  2.0<L>  /  TBili  0.8  /  DBili  x   /  AST  26  /  ALT  47  /  AlkPhos  64  04-12    LIVER FUNCTIONS - ( 12 Apr 2022 06:39 )  Alb: 2.0 g/dL / Pro: 6.1 g/dL / ALK PHOS: 64 U/L / ALT: 47 U/L DA / AST: 26 U/L / GGT: x           CARDIAC MARKERS ( 12 Apr 2022 06:39 )  x     / x     / 179 U/L / x     / x              CAPILLARY BLOOD GLUCOSE      RADIOLOGY & ADDITIONAL TESTS:

## 2022-04-12 NOTE — BH CONSULTATION LIAISON ASSESSMENT NOTE - NSBHCHARTREVIEWLAB_PSY_A_CORE FT
12.6   11.46 )-----------( 183      ( 12 Apr 2022 06:39 )             38.3   04-12    149<H>  |  121<H>  |  96<H>  ----------------------------<  178<H>  4.1   |  21<L>  |  2.93<H>    Ca    9.2      12 Apr 2022 15:25  Phos  4.3     04-12  Mg     2.5     04-12    TPro  6.1  /  Alb  2.0<L>  /  TBili  0.8  /  DBili  x   /  AST  26  /  ALT  47  /  AlkPhos  64  04-12

## 2022-04-12 NOTE — BH CONSULTATION LIAISON ASSESSMENT NOTE - HPI (INCLUDE ILLNESS QUALITY, SEVERITY, DURATION, TIMING, CONTEXT, MODIFYING FACTORS, ASSOCIATED SIGNS AND SYMPTOMS)
89F, living alone with P/T HHA, with unknown PHx and MHx of CAD s/p CABG, HTN, DM and CVA 2018, BIBEMS from home on 4/9 for weakness and fall and found to have UTI and ?GBS bacteremia [may be contaminant]. Psych consult was requested for recommendations on managing agitation which has been recurrent since admission, for which pt has received Zyprexa IM 2.5 mg-5 mg. Pt seen in her room for eval, found sitting up in bed awake, alert and calm. Per chart, pt had received PRN of Zyprexa 2.5 mg IM the previous night. Pt is interviewed using [employer] audio  #[id number]. When asked how she is feeling, pt says, "Not too bad" in English, but all her subsequent responses are a mixture of English and Bulgarian which  states she is unable to understand. At one point, pt appears to be talking about going to [facility] with her family: "We went there every year ..."   89F, living alone with P/T HHA, with unknown PHx and MHx of CAD s/p CABG, HTN, DM and CVA 2018, BIBEMS from home on 4/9 for weakness and fall and found to have UTI and ?GBS bacteremia [may be contaminant]. Psych consult was requested for recommendations on managing agitation which has been recurrent since admission, for which pt has received Zyprexa IM 2.5 mg-5 mg. Pt seen in her room for eval, found sitting up in bed awake, alert and calm. Per chart, pt had received PRN of Zyprexa 2.5 mg IM the previous night. Pt is interviewed using [employer] audio  #[id number]. When asked how she is feeling, pt says, "Not too bad" in English, but all her subsequent responses are a mixture of English and Khmer which  states she is unable to understand. At one point, pt appears to be talking about going to [facility] with her family: "We went there every year ..." Interview is ended due to pt inability to participate.

## 2022-04-12 NOTE — BH CONSULTATION LIAISON ASSESSMENT NOTE - NSBHCHARTREVIEWINVESTIGATE_PSY_A_CORE FT
< from: CT Head No Cont (04.09.22 @ 18:37) >    There is no evidence for acute infarct, acute hemorrhage, mass effect,   calvarial fracture, or hydrocephalus.    Mild to moderate patchy hypodensity without mass effect is noted in the   periventricular white matter which most likely represents chronic   microvascular ischemic changes given the patient's age.    Cerebral volume loss is present with secondary proportional prominence of   the sulci and ventricles.    No lytic or blastic calvarial lesions are noted. The visualized portions   of the paranasal sinuses and mastoid air cells are clear.    < end of copied text >     49 y/o Female; domiciled with mother in [place]; ; no children; unemployed. PPH of anxiety and bipolar disorder, Multiple prior Psychiatric admissions, last psychiatric admission in 2019 [facility] for depression after pt lost her father. History of polysubstance abuse. No pertinent past medical history. Patient was brought into the ED for evaluation of Suicidal Ideation, plan to jump in front of train.     >Obs: Routine, no current SI. no need for CO, patient not expected to pose risk to self or others in controlled inpatient setting  >Psychiatric Meds: Abilify increased to 10mg po daily, Cogentin 0.5mg po daily, Trileptal 300mg po twice a day, Atarax 50mg Q6H PRN. Home med Clonazepam 1mg po three times.  Senna 2mg qhs and Miralaax prn  >Medical:  No acute concerns  >Social: milieu therapy  >Treatment Interventions: Groups and Individual Therapy  >Dispo: Collateral and dispo planning pending further symptom and medication optimization.

## 2022-04-12 NOTE — PROGRESS NOTE ADULT - PROBLEM SELECTOR PLAN 2
Patient with grossly positive UA  s/p cefepime and NS 4L in ED  UCX (4/9) E.coli (pending sensitivities)  c/w ceftriaxone-today D3  f/u urine culture sensitivities  ID, Dr. Arnold, consulted

## 2022-04-12 NOTE — PROGRESS NOTE ADULT - PROBLEM SELECTOR PLAN 8
Patient per Kyle is on losartan 100mg daily  Given CHAZ on amlodipine 10 mg daily  BP controlled  continue w/ amlodipine 10 mg oral daily w/ parameters

## 2022-04-12 NOTE — PROGRESS NOTE ADULT - ASSESSMENT
#BROWN TUMOR  Elevated PTHi and Ca corrected to albumin 10.7  R/o prim hyperparathyroidism  Pending results of ionized calcium   Will follow up with results for further management     #VITAMIN D DEF  Vitamin D 25 is 19  Recommend Ergocalciferol 50,000 units weekly for 8 weeks     #DM   Patient with A1c 6.8 with unknown meds at home   A1c at target   Recommend to c/w diabetic diet  FS before meals and at bedtime, target 140-180  C/w HSS #BROWN TUMOR  Elevated PTHi and Ca corrected to albumin 10.7  R/o prim hyperparathyroidism  Pending results of ionized calcium   Will follow up with results for further management     #VITAMIN D DEF- likely causing high PTh as well  Vitamin D 25 is 19  Recommend Ergocalciferol 50,000 units weekly for 8 weeks     #DM   Patient with A1c 6.8 with unknown meds at home   A1c at target   Recommend to c/w diabetic diet  FS before meals and at bedtime, target 140-180  C/w HSS #BROWN TUMOR  Elevated PTHi and Ca corrected to albumin 10.7  R/o prim hyperparathyroidism  Pending results of ionized calcium   Will follow up with results for further management     #VITAMIN D DEF- likely causing high PTH as well  Vitamin D 25 is 19  Recommend Ergocalciferol 50,000 units weekly for 8 weeks     #DM   Patient with A1c 6.8 with unknown meds at home   A1c at target   Recommend to c/w diabetic diet  FS before meals and at bedtime, target 140-180  C/w HSS      D/w primary team

## 2022-04-12 NOTE — PROGRESS NOTE ADULT - PROBLEM SELECTOR PLAN 5
Likely hypovolemic hypernatremia giving BUN also trending up  previously on NS 0.45  Na 148>151  switched today to D5 75 cc/hr  will repeat today BMP in afternoon to monitor correction and adjust D5 as needed.

## 2022-04-12 NOTE — PROGRESS NOTE ADULT - ATTENDING COMMENTS
89 year old F admitted for fall and found to have bacteremia with group B strep and E.Coli UTI    #Group B strep bacteremia  #UTI E.coli  #AMS 2/2 Toxic metabolic enceophalopathy in the setting of infx  #Hypernatremia  #AHRF possibly aspiration vs volume overload  #CHAZ vs CKD, Cr stable   #Chronic atrial fibrillation  #Urinary retention s/p acosta 4/12  - f/u repeat blood cultures  - f/u urine culture  - D5W 75cc/hr x 12 hours, repeat BMP  - start flomax, will attempt TOV  - nephrology following for CHAZ workup   - continue eliquis  - f/u ionized ca and vit D due to concern for brown tumors associated with hyperparathyroidism

## 2022-04-12 NOTE — PROGRESS NOTE ADULT - PROBLEM SELECTOR PLAN 4
- noted to have Cr 3.54 (unknown baseline)  - s/p NS 4L bolus in ED  - CT A/P atrophic (L) Kidney  - SCr 2.9>3.04>3.11 today (trending up)  - u-elytes showed FeNA 3.6 % (intrinsic)  - CHAZ likely triggered by infection  - will try to obtain records from PCP to know what her baseline SCr is  - No need for renal US as per Dr. Hanna

## 2022-04-12 NOTE — PROGRESS NOTE ADULT - SUBJECTIVE AND OBJECTIVE BOX
Interval Events: patient alert, answering questions, denies any pain or complaints       Allergies    No Known Allergies    Intolerances      Endocrine/Metabolic Medications:  dextrose 50% Injectable 25 Gram(s) IV Push once  dextrose 50% Injectable 12.5 Gram(s) IV Push once  dextrose 50% Injectable 25 Gram(s) IV Push once  dextrose Oral Gel 15 Gram(s) Oral once PRN  glucagon  Injectable 1 milliGRAM(s) IntraMuscular once  insulin lispro (ADMELOG) corrective regimen sliding scale   SubCutaneous Before meals and at bedtime      Vital Signs Last 24 Hrs  T(C): 36.4 (12 Apr 2022 05:30), Max: 37.1 (11 Apr 2022 13:25)  T(F): 97.6 (12 Apr 2022 05:30), Max: 98.7 (11 Apr 2022 13:25)  HR: 81 (12 Apr 2022 05:30) (81 - 97)  BP: 149/62 (12 Apr 2022 05:30) (124/87 - 154/79)  BP(mean): --  RR: 17 (12 Apr 2022 05:30) (16 - 18)  SpO2: 96% (12 Apr 2022 05:30) (94% - 97%)      PHYSICAL EXAM  All physical exam findings normal, except those marked:  General:	Alert, active, cooperative, NAD, well hydrated  .		[] Abnormal:  Neck		Normal: supple, no cervical adenopathy, no palpable thyroid  .		[] Abnormal:  Cardiovascular	Normal: regular rate, normal S1, S2, no murmurs  .		[] Abnormal:  Respiratory	Normal: no chest wall deformity, normal respiratory pattern, CTA B/L  .		[] Abnormal:  Abdominal	Normal: soft, ND, NT, bowel sounds present, no masses, no organomegaly  .		[] Abnormal:  		Normal normal genitalia, testes descended, circumcised/uncircumcised  .		Benito stage:			Breast benito:  .		Menstrual history:  .		[] Abnormal:  Extremities	Normal: FROM x4  .		[] Abnormal:  Skin		Normal: intact and not indurated, no rash, no acanthosis nigricans  .		[] Abnormal:  Neurologic	Normal: grossly intact  .		[] Abnormal:    LABS                        12.6   11.46 )-----------( 183      ( 12 Apr 2022 06:39 )             38.3                               151    |  124    |  94                  Calcium: 9.2   / iCa: x      (04-12 @ 06:39)    ----------------------------<  142       Magnesium: 2.5                              3.9     |  16     |  3.11             Phosphorous: 4.3      TPro  6.1    /  Alb  2.0    /  TBili  0.8    /  DBili  x      /  AST  26     /  ALT  47     /  AlkPhos  64     12 Apr 2022 06:39    CAPILLARY BLOOD GLUCOSE      POCT Blood Glucose.: 255 mg/dL (12 Apr 2022 11:42)  POCT Blood Glucose.: 140 mg/dL (12 Apr 2022 07:46)  POCT Blood Glucose.: 195 mg/dL (11 Apr 2022 22:47)  POCT Blood Glucose.: 208 mg/dL (11 Apr 2022 21:22)  POCT Blood Glucose.: 146 mg/dL (11 Apr 2022 16:40)        Assesment/plan

## 2022-04-12 NOTE — BH CONSULTATION LIAISON ASSESSMENT NOTE - SUMMARY
89F, living alone with P/T HHA, with unknown PHx and MHx of CAD s/p CABG, HTN, DM and CVA 2018, BIBEMS from home on 4/9 for weakness and fall and found to have UTI and ?GBS bacteremia [may be contaminant]. Psych consult was requested for recommendations on managing agitation which has been recurrent since admission, for which pt has received Zyprexa IM 2.5 mg-5 mg. On exam, pt is calm (s/p Zyprexa 2.5 mg IM the previous night) but incoherent, speaking in a mixture of English and Kiswahili about irrelevant topics such as going to [place] every year. Impression is toxic-metabolic encephalopathy, superimposed on likely dementia (based on imaging findings and pt's age). Given that pt appears to have responded favorably to previous doses of Zyprexa IM, we recommend starting Zyprexa PO 2.5 mg qhs to prevent further incidents of agitation. Pt does not appear to present an acute risk of harm to self or others at the time of assessment, and does not appear to be in need of admission to IP psych at the time of assessment.

## 2022-04-12 NOTE — BH CONSULTATION LIAISON ASSESSMENT NOTE - NSBHCONSULTRECOMMENDOTHER_PSY_A_CORE FT
1. Start Zyprexa PO 2.5 mg qhs for delirium/agitation  2. For breakthrough agitation not responding to standing Zyprexa, recommend Zyprexa 2.5 mg IM q12h PRN agitation (USE SPARINGLY)  3. Medical management as directed by primary team  4. Psychiatry is signing off. Reconsult if additional issues arise as inpatient  5. Case d/w primary team    Whit Thomas MD  Director, Consultation-Liaison Psychiatry Service  [id number]

## 2022-04-12 NOTE — BH CONSULTATION LIAISON ASSESSMENT NOTE - CURRENT MEDICATION
MEDICATIONS  (STANDING):  amLODIPine   Tablet 10 milliGRAM(s) Oral daily  apixaban 2.5 milliGRAM(s) Oral two times a day  cefTRIAXone   IVPB 2000 milliGRAM(s) IV Intermittent every 24 hours  citalopram 20 milliGRAM(s) Oral daily  dextrose 5%. 1000 milliLiter(s) (75 mL/Hr) IV Continuous <Continuous>  dextrose 5%. 1000 milliLiter(s) (50 mL/Hr) IV Continuous <Continuous>  dextrose 5%. 1000 milliLiter(s) (100 mL/Hr) IV Continuous <Continuous>  dextrose 50% Injectable 25 Gram(s) IV Push once  dextrose 50% Injectable 12.5 Gram(s) IV Push once  dextrose 50% Injectable 25 Gram(s) IV Push once  ergocalciferol 78460 Unit(s) Oral <User Schedule>  glucagon  Injectable 1 milliGRAM(s) IntraMuscular once  insulin lispro (ADMELOG) corrective regimen sliding scale   SubCutaneous Before meals and at bedtime  metroNIDAZOLE  IVPB 500 milliGRAM(s) IV Intermittent every 8 hours  OLANZapine 2.5 milliGRAM(s) Oral at bedtime    MEDICATIONS  (PRN):  dextrose Oral Gel 15 Gram(s) Oral once PRN Blood Glucose LESS THAN 70 milliGRAM(s)/deciliter

## 2022-04-12 NOTE — PROGRESS NOTE ADULT - PROBLEM SELECTOR PLAN 6
Pt with urinary retention  Bladder scan showed ~600 cc urine  (4/11)-straight cath ~3PM today, obtaining 1000 cc of urine  Deleon catheter placed on 4/11  c/w Deleon

## 2022-04-12 NOTE — PROGRESS NOTE ADULT - ASSESSMENT
Patient is a 89yo Female with unknown PMH, presents with weakness and fall per ED chart. Admitted for fall found to have UTI and GBS bacteremia. Nephrology consulted for Elevated serum creatinine.    1. CHAZ- unknown baseline SCr; likely hemodynamically mediated in the setting of sepsis/ infection and less likely due to obstruction. Active UA in the setting of UTI. FeNa 3.59%; intrinsic likely ATN. +bladder scan and now with acosta; with no improvement in renal function; which is more consistent with ATN. Pt now hypernatremia- with free water deficit 2.7L; agree with changing IVF to D5 @ 75 ml/hr. CT abd/pelvis with no hydro but distended bladder with atrophic left kidney.  Strict I/Os. Avoid nephrotoxins/ NSAIDs/ RCA. Monitor BMP.  2. CKD unspecified- previous SCr 1.6-1.9 in 2018; however no recent blood work for review. CT abd/pelvis with left atrophic kidney. Defer secondary w/u due to advanced age. Avoid nephrotoxins  As per son (at bedside); pt was born with atrophic left kidney and use to see Nephrologist Dr. Mercer for years but no longer accepts her insurance and has not seen in 2 yrs. Pt subsequently saw a new PMD in November since returning from [facility]; he will try to get us the PMD's office number.   3. Sepsis- due to Strep agalactia bactremia and Ecoli UTI. Pt on Ceftriaxone. ID following  4. HTN 2/2 CKD- BP improving. If elevated BP - Recc to d/c Amlodipine and switch to Nifedipine ER 60mg PO daily, titrate as needed. c/w low salt diet. Monitor BP  5. Acute encephalopathy- CT head neg. Plan as per primary team.       [facility]  Gaurav Helms M.D.  Dylan Pimentel D.O.  Sindy Baugh M.D.  Citlaly Bridges, MSN, ANP-C  [phone]    [address] [place] [address]

## 2022-04-12 NOTE — BH CONSULTATION LIAISON ASSESSMENT NOTE - NSBHCHARTREVIEWVS_PSY_A_CORE FT
Vital Signs Last 24 Hrs  T(C): 36.6 (12 Apr 2022 13:21), Max: 37.1 (11 Apr 2022 21:06)  T(F): 97.9 (12 Apr 2022 13:21), Max: 98.7 (11 Apr 2022 21:06)  HR: 92 (12 Apr 2022 13:21) (81 - 97)  BP: 163/58 (12 Apr 2022 13:21) (124/87 - 163/58)  BP(mean): --  RR: 18 (12 Apr 2022 13:21) (16 - 18)  SpO2: 99% (12 Apr 2022 13:21) (96% - 99%)

## 2022-04-12 NOTE — PROGRESS NOTE ADULT - PROBLEM SELECTOR PLAN 7
Patient with chronic atrial fibrillation per chart review with [id number] per prior cardiology note. Was admitted to [facility] in 2018 for acute CVA  Continue Eliquis 2.5mg po BID  Does not appear to currently be on a BB, so far not requiring rate controlled medication

## 2022-04-12 NOTE — PROGRESS NOTE ADULT - PROBLEM SELECTOR PLAN 3
Pt AAOx 3 at home  able to do some ADL on her own  CTH negative   likely toxic-metabolic in the s/o UTI  MS improving today on reassessment  if pt continues getting agitated ok to give Zyprexa 2.5 mg IM; avoid haldol given QTc>500 msec   Dr. Thomas consulted today for recs in case she gets agitated again

## 2022-04-12 NOTE — BH CONSULTATION LIAISON ASSESSMENT NOTE - RISK ASSESSMENT
Risk factors: Advanced age, lives alone, likely dementia, multiple health problems including Afib, DM2 and multiple UTIs. Protective factors: Absent h/o SI/SA/SIB, stable domicile, supportive family, mostly cooperative with care. Acute risk level appears low at the time of assessment, but chronic risk may be mildly elevated due to above risk factors.

## 2022-04-12 NOTE — BH CONSULTATION LIAISON ASSESSMENT NOTE - NSICDXPASTMEDICALHX_GEN_ALL_CORE_FT
PAST MEDICAL HISTORY:  CAD (coronary artery disease)     Diabetes     DM (diabetes mellitus)     Gout     HTN (hypertension)     Hypertension     Meniere disease

## 2022-04-13 DIAGNOSIS — J96.01 ACUTE RESPIRATORY FAILURE WITH HYPOXIA: ICD-10-CM

## 2022-04-13 LAB
ALBUMIN SERPL ELPH-MCNC: 1.8 G/DL — LOW (ref 3.5–5)
ALP SERPL-CCNC: 63 U/L — SIGNIFICANT CHANGE UP (ref 40–120)
ALT FLD-CCNC: 35 U/L DA — SIGNIFICANT CHANGE UP (ref 10–60)
ANION GAP SERPL CALC-SCNC: 10 MMOL/L — SIGNIFICANT CHANGE UP (ref 5–17)
AST SERPL-CCNC: 15 U/L — SIGNIFICANT CHANGE UP (ref 10–40)
BILIRUB SERPL-MCNC: 0.6 MG/DL — SIGNIFICANT CHANGE UP (ref 0.2–1.2)
BUN SERPL-MCNC: 71 MG/DL — HIGH (ref 7–18)
CA-I BLD-SCNC: 5 MG/DL — SIGNIFICANT CHANGE UP (ref 4.5–5.6)
CALCIUM SERPL-MCNC: 9.1 MG/DL — SIGNIFICANT CHANGE UP (ref 8.4–10.5)
CHLORIDE SERPL-SCNC: 121 MMOL/L — HIGH (ref 96–108)
CO2 SERPL-SCNC: 17 MMOL/L — LOW (ref 22–31)
CREAT SERPL-MCNC: 2.3 MG/DL — HIGH (ref 0.5–1.3)
EGFR: 20 ML/MIN/1.73M2 — LOW
GLUCOSE BLDC GLUCOMTR-MCNC: 126 MG/DL — HIGH (ref 70–99)
GLUCOSE BLDC GLUCOMTR-MCNC: 177 MG/DL — HIGH (ref 70–99)
GLUCOSE BLDC GLUCOMTR-MCNC: 197 MG/DL — HIGH (ref 70–99)
GLUCOSE BLDC GLUCOMTR-MCNC: 209 MG/DL — HIGH (ref 70–99)
GLUCOSE SERPL-MCNC: 199 MG/DL — HIGH (ref 70–99)
HCT VFR BLD CALC: 37.6 % — SIGNIFICANT CHANGE UP (ref 34.5–45)
HGB BLD-MCNC: 12.6 G/DL — SIGNIFICANT CHANGE UP (ref 11.5–15.5)
MAGNESIUM SERPL-MCNC: 2 MG/DL — SIGNIFICANT CHANGE UP (ref 1.6–2.6)
MCHC RBC-ENTMCNC: 32.1 PG — SIGNIFICANT CHANGE UP (ref 27–34)
MCHC RBC-ENTMCNC: 33.5 GM/DL — SIGNIFICANT CHANGE UP (ref 32–36)
MCV RBC AUTO: 95.7 FL — SIGNIFICANT CHANGE UP (ref 80–100)
NRBC # BLD: 0 /100 WBCS — SIGNIFICANT CHANGE UP (ref 0–0)
NT-PROBNP SERPL-SCNC: HIGH PG/ML (ref 0–450)
PHOSPHATE SERPL-MCNC: 3 MG/DL — SIGNIFICANT CHANGE UP (ref 2.5–4.5)
PLATELET # BLD AUTO: 205 K/UL — SIGNIFICANT CHANGE UP (ref 150–400)
POTASSIUM SERPL-MCNC: 3.8 MMOL/L — SIGNIFICANT CHANGE UP (ref 3.5–5.3)
POTASSIUM SERPL-SCNC: 3.8 MMOL/L — SIGNIFICANT CHANGE UP (ref 3.5–5.3)
PROCALCITONIN SERPL-MCNC: 10.8 NG/ML — HIGH (ref 0.02–0.1)
PROT SERPL-MCNC: 5.9 G/DL — LOW (ref 6–8.3)
RBC # BLD: 3.93 M/UL — SIGNIFICANT CHANGE UP (ref 3.8–5.2)
RBC # FLD: 12.2 % — SIGNIFICANT CHANGE UP (ref 10.3–14.5)
SODIUM SERPL-SCNC: 148 MMOL/L — HIGH (ref 135–145)
WBC # BLD: 14.46 K/UL — HIGH (ref 3.8–10.5)
WBC # FLD AUTO: 14.46 K/UL — HIGH (ref 3.8–10.5)

## 2022-04-13 PROCEDURE — 99233 SBSQ HOSP IP/OBS HIGH 50: CPT | Mod: GC

## 2022-04-13 RX ORDER — TAMSULOSIN HYDROCHLORIDE 0.4 MG/1
0.4 CAPSULE ORAL AT BEDTIME
Refills: 0 | Status: DISCONTINUED | OUTPATIENT
Start: 2022-04-13 | End: 2022-04-15

## 2022-04-13 RX ORDER — SODIUM CHLORIDE 9 MG/ML
1000 INJECTION, SOLUTION INTRAVENOUS
Refills: 0 | Status: DISCONTINUED | OUTPATIENT
Start: 2022-04-13 | End: 2022-04-14

## 2022-04-13 RX ADMIN — Medication 1: at 08:25

## 2022-04-13 RX ADMIN — SODIUM CHLORIDE 75 MILLILITER(S): 9 INJECTION, SOLUTION INTRAVENOUS at 05:48

## 2022-04-13 RX ADMIN — TAMSULOSIN HYDROCHLORIDE 0.4 MILLIGRAM(S): 0.4 CAPSULE ORAL at 21:26

## 2022-04-13 RX ADMIN — Medication 100 MILLIGRAM(S): at 14:18

## 2022-04-13 RX ADMIN — CEFTRIAXONE 100 MILLIGRAM(S): 500 INJECTION, POWDER, FOR SOLUTION INTRAMUSCULAR; INTRAVENOUS at 11:02

## 2022-04-13 RX ADMIN — APIXABAN 2.5 MILLIGRAM(S): 2.5 TABLET, FILM COATED ORAL at 18:58

## 2022-04-13 RX ADMIN — Medication 1: at 21:35

## 2022-04-13 RX ADMIN — OLANZAPINE 2.5 MILLIGRAM(S): 15 TABLET, FILM COATED ORAL at 21:26

## 2022-04-13 RX ADMIN — CITALOPRAM 20 MILLIGRAM(S): 10 TABLET, FILM COATED ORAL at 13:11

## 2022-04-13 RX ADMIN — Medication 100 MILLIGRAM(S): at 05:47

## 2022-04-13 RX ADMIN — Medication 2: at 12:05

## 2022-04-13 RX ADMIN — Medication 100 MILLIGRAM(S): at 21:26

## 2022-04-13 NOTE — PROGRESS NOTE ADULT - SUBJECTIVE AND OBJECTIVE BOX
[facility] NEPHROLOGY- PROGRESS NOTE    Patient is a 89yo Female with unknown PMH, presents with weakness and fall per ED chart. Admitted for fall found to have UTI and GBS bacteremia. Nephrology consulted for Elevated serum creatinine.    Hospital Medications: Medications reviewed.  REVIEW OF SYSTEMS: Turkmen  # [id number]  Limited; denies any SOB or chest pain but unable to comprehend further speech.     VITALS:  T(F): 98.4 (22 @ 05:28), Max: 99 (22 @ 21:25)  HR: 82 (22 @ 05:28)  BP: 151/76 (22 @ 05:28)  RR: 17 (22 @ 05:28)  SpO2: 97% (22 @ 05:28)  Wt(kg): --     @ 07:01  -   @ 07:00  --------------------------------------------------------  IN: 0 mL / OUT: 3300 mL / NET: -3300 mL      PHYSICAL EXAM:  Constitutional: NAD  Neurological: Awake more alert but rambling incoherent words  HEENT: anicteric sclera,   Respiratory: CTA ant  Cardiovascular: S1, S2, RRR  Gastrointestinal: BS+, soft, NT/ND  : +acosta with clear urine  Extremities: No peripheral edema    LABS:    148 <--, 149 <--, 151 <--, 146 <--, 143 <--, 138 <--  148<H>  |  121<H>  |  71<H>  ----------------------------<  199<H>  3.8   |  17<L>  |  2.30<H>    Ca    9.1      2022 07:28  Phos  3.0       Mg     2.0         TPro  5.9<L>  /  Alb  1.8<L>  /  TBili  0.6  /  DBili      /  AST  15  /  ALT  35  /  AlkPhos  63      Creatinine Trend: 2.30 <--, 2.93 <--, 3.11 <--, 3.04 <--, 2.94 <--, 3.54 <--                        12.6   14.46 )-----------( 205      ( 2022 07:28 )             37.6     Urine Studies:  Urinalysis Basic - ( 2022 14:50 )    Color: Yellow / Appearance: Slightly Turbid / S.015 / pH:   Gluc:  / Ketone: Negative  / Bili: Negative / Urobili: Negative   Blood:  / Protein: 500 mg/dL / Nitrite: Negative   Leuk Esterase: Moderate / RBC: 5-10 /HPF / WBC >50 /HPF   Sq Epi:  / Non Sq Epi: Few /HPF / Bacteria: TNTC /HPF      Sodium, Random Urine: 68 mmol/L ( @ 02:18)  Creatinine, Random Urine: 39 mg/dL ( @ 02:18)  Chloride, Random Urine: 67 mmol/L ( @ 02:18)

## 2022-04-13 NOTE — PROGRESS NOTE ADULT - PROBLEM SELECTOR PLAN 8
Pt with urinary retention  Bladder scan showed ~600 cc urine  (4/11)-straight cath ~3PM today, obtaining 1000 cc of urine  Deleon catheter placed on 4/11  c/w Deleon  started on Flomax today   potentially attempt TOV  tomorrow

## 2022-04-13 NOTE — PROGRESS NOTE ADULT - ASSESSMENT
Patient is a 89yo Female with unknown PMH, presents with weakness and fall per ED chart. Admitted for fall found to have UTI and GBS bacteremia. Nephrology consulted for Elevated serum creatinine.    1. CHAZ- unknown baseline SCr; likely hemodynamically mediated in the setting of sepsis/ infection and less likely due to obstruction. Active UA in the setting of UTI. FeNa 3.59%; intrinsic likely ATN. +bladder scan and now with acosta; with no improvement in renal function after acosta placement which is more consistent with ATN. Pt now hypernatremia- with initial free water deficit 2.7L; D5 reduced to 40cc/hr due to elevated BNP with improving Na. CT abd/pelvis with no hydro but distended bladder with atrophic left kidney.  Strict I/Os. Avoid nephrotoxins/ NSAIDs/ RCA. Monitor BMP.  2. CKD unspecified- previous SCr 1.6-1.9 in 2018; however no recent blood work for review. CT abd/pelvis with left atrophic kidney. Defer secondary w/u due to advanced age. Avoid nephrotoxins  As per pt's son (at bedside); pt was born with atrophic left kidney and use to see Nephrologist Dr. Mercer for years but no longer accepts her insurance and has not seen in 2 yrs. Pt subsequently saw a new PMD in November since returning from [facility]; he will try to get us the PMD's office number.   3. Sepsis- due to Strep agalactia bactremia and Ecoli UTI with ?aspiration PNA. Pt on Ceftriaxone & Flagyl. ID following  4. HTN 2/2 CKD- BP improving. If elevated BP - Recc to d/c Amlodipine and switch to Nifedipine ER 60mg PO daily, titrate as needed. c/w low salt diet. Monitor BP  5. Acute encephalopathy- CT head neg. Plan as per primary team.       [facility] NEPHROLOGY  Gaurav Helms M.D.  Dylan Pimentel D.O.  Sindy Baugh M.D.  Citlaly Bridges, BETH, ANP-C  [phone]    [id number] [id number]

## 2022-04-13 NOTE — PROGRESS NOTE ADULT - PROBLEM SELECTOR PLAN 2
-BCX (4/9) Step. Agalactiae X 2  -Source unclear. ?Urine? Patient with known colo-vesicular fistula per discussion with son, Jose  -May be contaminant as per ID  -Lactate 2.5->1.5  -WBC slightly uptrending  -repeat BCX (4/11) NGTD X 2  -c/w ceftriaxone D4

## 2022-04-13 NOTE — PROGRESS NOTE ADULT - ATTENDING COMMENTS
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   #Chronic atrial fibrillation  #Urinary retention s/p acosta 4/12  - repeat blood cx NGTD  - f/u urine culture  - CT chest today  - D5W 75cc/hr x 12 hours, repeat BMP  - start flomax, will attempt TOV prior to dc  - nephrology following for CHAZ workup   - continue eliquis  - f/u ionized ca and vit D due to concern for brown tumors associated with hyperparathyroidism .

## 2022-04-13 NOTE — PROGRESS NOTE ADULT - ASSESSMENT
#BROWN TUMOR  Elevated PTHi and Ca corrected to albumin 10.7  R/o prim hyperparathyroidism  Pending results of ionized calcium   Will follow up with results for further management     #VITAMIN D DEF- likely causing high PTH as well  Vitamin D 25 is 19  C/w Ergocalciferol 50,000 units weekly for 8 weeks     #DM   Patient with A1c 6.8 with unknown meds at home   A1c at target   Recommend to c/w diabetic diet  FS before meals and at bedtime, target 140-180  C/w HSS       #BROWN TUMOR  Elevated PTHi and Ca corrected to albumin 10.7  R/o prim hyperparathyroidism  Pending results of ionized calcium   Will follow up with results for further management     #VITAMIN D DEF- likely causing high PTH as well  Vitamin D 25 is 19  C/w Ergocalciferol 50,000 units weekly for 8 weeks     #DM   Patient with A1c 6.8 with unknown meds at home   A1c at target   Recommend to c/w diabetic diet  FS before meals and at bedtime, target 140-180  C/w HSS    D/w primary team       #BROWN TUMOR  Elevated PTHi and Ca corrected to albumin 10.7  R/o prim hyperparathyroidism  Pending results of ionized calcium   Will follow up with results for further management.      #VITAMIN D DEF- likely causing high PTH as well  Vitamin D 25 is 19  C/w Ergocalciferol 50,000 units weekly for 8 weeks     #DM   Patient with A1c 6.8 with unknown meds at home   A1c at target   Recommend to c/w diabetic diet  FS before meals and at bedtime, target 140-180  C/w HSS    D/w primary team

## 2022-04-13 NOTE — PROGRESS NOTE ADULT - PROBLEM SELECTOR PLAN 1
Patient with grossly positive UA  s/p cefepime and NS 4L in ED  UCX (4/9) E.coli  sensitive to ceftriaxone  c/w ceftriaxone-today D4  ID, Dr. Arnold, consulted

## 2022-04-13 NOTE — PROGRESS NOTE ADULT - PROBLEM SELECTOR PLAN 3
Pt AAOx 3 at home  able to do some ADL on her own  CTH negative   likely delirium in the s/o UTI with some component of dementia  MS somewhat improved  explained to pt's son that given her advanced age it may take some time for her to go back to her baseline MS  As per Dr. Thomas recs- Zyprexa 2.5 mg PO QHS; for breakthrough agitation Zyprexa 2.5 mg IM Q 12 hrs PRN only if needed

## 2022-04-13 NOTE — PROGRESS NOTE ADULT - SUBJECTIVE AND OBJECTIVE BOX
Interval Events: patient confused today, not able to sustain a conversation, not in distress       Allergies    No Known Allergies    Intolerances      Endocrine/Metabolic Medications:  dextrose 50% Injectable 25 Gram(s) IV Push once  dextrose Oral Gel 15 Gram(s) Oral once PRN  glucagon  Injectable 1 milliGRAM(s) IntraMuscular once  insulin lispro (ADMELOG) corrective regimen sliding scale   SubCutaneous Before meals and at bedtime      Vital Signs Last 24 Hrs  T(C): 36.9 (13 Apr 2022 05:28), Max: 37.2 (12 Apr 2022 21:25)  T(F): 98.4 (13 Apr 2022 05:28), Max: 99 (12 Apr 2022 21:25)  HR: 82 (13 Apr 2022 05:28) (82 - 92)  BP: 151/76 (13 Apr 2022 05:28) (151/76 - 163/58)  BP(mean): --  RR: 17 (13 Apr 2022 05:28) (17 - 18)  SpO2: 97% (13 Apr 2022 05:28) (97% - 99%)      PHYSICAL EXAM  All physical exam findings normal, except those marked:  General:	Alert, active, cooperative, NAD, well hydrated  .		[] Abnormal:  Neck		Normal: supple, no cervical adenopathy, no palpable thyroid  .		[] Abnormal:  Cardiovascular	Normal: regular rate, normal S1, S2, no murmurs  .		[] Abnormal:  Respiratory	Normal: no chest wall deformity, normal respiratory pattern, CTA B/L  .		[] Abnormal:  Abdominal	Normal: soft, ND, NT, bowel sounds present, no masses, no organomegaly  .		[] Abnormal:  		Normal normal genitalia, testes descended, circumcised/uncircumcised  .		Benito stage:			Breast benito:  .		Menstrual history:  .		[] Abnormal:  Extremities	Normal: FROM x4  .		[] Abnormal:  Skin		Normal: intact and not indurated, no rash, no acanthosis nigricans  .		[] Abnormal:  Neurologic	Normal: grossly intact  .		[] Abnormal:    LABS                        12.6   14.46 )-----------( 205      ( 13 Apr 2022 07:28 )             37.6                               148    |  121    |  71                  Calcium: 9.1   / iCa: x      (04-13 @ 07:28)    ----------------------------<  199       Magnesium: 2.0                              3.8     |  17     |  2.30             Phosphorous: 3.0      TPro  5.9    /  Alb  1.8    /  TBili  0.6    /  DBili  x      /  AST  15     /  ALT  35     /  AlkPhos  63     13 Apr 2022 07:28    CAPILLARY BLOOD GLUCOSE      POCT Blood Glucose.: 177 mg/dL (13 Apr 2022 07:40)  POCT Blood Glucose.: 159 mg/dL (12 Apr 2022 21:42)  POCT Blood Glucose.: 145 mg/dL (12 Apr 2022 16:38)  POCT Blood Glucose.: 255 mg/dL (12 Apr 2022 11:42)        Assesment/plan

## 2022-04-13 NOTE — PROGRESS NOTE ADULT - PROBLEM SELECTOR PLAN 7
- noted to have scattered osseous lucencies most prominent in the iliac bones and T11   vertebral body  - ddx: brown tumor  - PTH elevated at 170  - Vit D levels at 19  - started on oral Vit D supplementation X 8 wks as per endo  - awaiting for ionized Ca results to determine whether primary hyperparathyroidisms v/s secondary d/t vit D deficiency   - f/u ionized Ca results   - Dr. Hdez on board

## 2022-04-13 NOTE — PROGRESS NOTE ADULT - PROBLEM SELECTOR PLAN 6
Likely hypovolemic hypernatremia giving BUN also trending up  previously on NS 0.45  Na 148>151>148 correcting  Given high pro-BNP of 35K and calculated free H2O deficit of 1.9, adjusted rate of D5 to 40 cc/hr  repeat BMP tomorrow

## 2022-04-13 NOTE — PROGRESS NOTE ADULT - PROBLEM SELECTOR PLAN 4
- Hx of atrophic kidney  - noted to have Cr 3.54 (unknown baseline)  - s/p NS 4L bolus in ED  - CT A/P atrophic (L) Kidney  - SCr 2.9>3.04>3.11>2.9>2.3; improving w/ IVF  - u-elytes showed FeNA 3.6 % (intrinsic)  - CHAZ likely d/t ATN in the s/o infection  - will try to obtain records from PCP to know what her baseline SCr is; attempted to call yesterday but PCP office was closed  - No need for renal US as per Dr. Hanna

## 2022-04-13 NOTE — PROGRESS NOTE ADULT - PROBLEM SELECTOR PLAN 9
Patient with chronic atrial fibrillation per chart review with [id number] per prior cardiology note. Was admitted to [facility] in 2018 for acute CVA  Continue Eliquis 2.5mg po BID  Does not appear to currently be on a BB, so far not requiring rate controlled medication

## 2022-04-13 NOTE — PROGRESS NOTE ADULT - PROBLEM SELECTOR PLAN 5
Pt with new mild O2 requirements  CXR  wet read looks like fluid overload; however, aspiration PNA cannot be r/o (still waiting for final read)  CTC ordered today to further evaluate  depending on results pt could potentially benefit from diuretic  c/w flagyl D2   titrate down O2 as tolerated  f/u procal

## 2022-04-13 NOTE — PROGRESS NOTE ADULT - SUBJECTIVE AND OBJECTIVE BOX
PGY-1 Progress Note discussed with attending    PAGER #: [[phone]] TILL 5:00 PM  PLEASE CONTACT ON CALL TEAM:  - On Call Team (Please refer to Migue) FROM 5:00 PM - 8:30PM  - Nightfloat Team FROM 8:30 -7:30 AM    INTERVAL HPI/OVERNIGHT EVENTS:       REVIEW OF SYSTEMS:  CONSTITUTIONAL: No fever, weight loss, or fatigue  RESPIRATORY: No cough, wheezing, chills or hemoptysis; No shortness of breath  CARDIOVASCULAR: No chest pain, palpitations, dizziness, or leg swelling  GASTROINTESTINAL: No abdominal pain. No nausea, vomiting, or hematemesis; No diarrhea or constipation. No melena or hematochezia.  GENITOURINARY: No dysuria or hematuria, urinary frequency  NEUROLOGICAL: No headaches, memory loss, loss of strength, numbness, or tremors  SKIN: No itching, burning, rashes, or lesions     Vital Signs Last 24 Hrs  T(C): 36.9 (13 Apr 2022 05:28), Max: 37.2 (12 Apr 2022 21:25)  T(F): 98.4 (13 Apr 2022 05:28), Max: 99 (12 Apr 2022 21:25)  HR: 82 (13 Apr 2022 05:28) (82 - 92)  BP: 151/76 (13 Apr 2022 05:28) (151/76 - 163/58)  BP(mean): --  RR: 17 (13 Apr 2022 05:28) (17 - 18)  SpO2: 97% (13 Apr 2022 05:28) (97% - 99%)    PHYSICAL EXAMINATION:  GENERAL: NAD, well built  HEAD:  Atraumatic, Normocephalic  EYES:  conjunctiva and sclera clear  NECK: Supple, No JVD, Normal thyroid  CHEST/LUNG: Clear to auscultation. Clear to percussion bilaterally; No rales, rhonchi, wheezing, or rubs  HEART: Regular rate and rhythm; No murmurs, rubs, or gallops  ABDOMEN: Soft, Nontender, Nondistended; Bowel sounds present  NERVOUS SYSTEM:  Alert & Oriented X3,    EXTREMITIES:  2+ Peripheral Pulses, No clubbing, cyanosis, or edema  SKIN: warm dry                          12.6   14.46 )-----------( 205      ( 13 Apr 2022 07:28 )             37.6     04-13    148<H>  |  121<H>  |  71<H>  ----------------------------<  199<H>  3.8   |  17<L>  |  2.30<H>    Ca    9.1      13 Apr 2022 07:28  Phos  3.0     04-13  Mg     2.0     04-13    TPro  5.9<L>  /  Alb  1.8<L>  /  TBili  0.6  /  DBili  x   /  AST  15  /  ALT  35  /  AlkPhos  63  04-13    LIVER FUNCTIONS - ( 13 Apr 2022 07:28 )  Alb: 1.8 g/dL / Pro: 5.9 g/dL / ALK PHOS: 63 U/L / ALT: 35 U/L DA / AST: 15 U/L / GGT: x           CARDIAC MARKERS ( 12 Apr 2022 06:39 )  x     / x     / 179 U/L / x     / x              CAPILLARY BLOOD GLUCOSE      RADIOLOGY & ADDITIONAL TESTS:                   PGY-1 Progress Note discussed with attending    PAGER #: [[phone]] TILL 5:00 PM  PLEASE CONTACT ON CALL TEAM:  - On Call Team (Please refer to Migue) FROM 5:00 PM - 8:30PM  - Nightfloat Team FROM 8:30 -7:30 AM    INTERVAL HPI/OVERNIGHT EVENTS:  no acute overnight events. Pt seen and examined at bedside, awake, alert, receiving oral feeds from PCA, calmed. Speaking to me in english and native language, but incoherent. Given her MS cannot assess for ROS.     Vital Signs Last 24 Hrs  T(C): 36.9 (13 Apr 2022 05:28), Max: 37.2 (12 Apr 2022 21:25)  T(F): 98.4 (13 Apr 2022 05:28), Max: 99 (12 Apr 2022 21:25)  HR: 82 (13 Apr 2022 05:28) (82 - 92)  BP: 151/76 (13 Apr 2022 05:28) (151/76 - 163/58)  BP(mean): --  RR: 17 (13 Apr 2022 05:28) (17 - 18)  SpO2: 97% (13 Apr 2022 05:28) (97% - 99%)    PHYSICAL EXAMINATION:  GENERAL: NAD, well built  HEAD:  Atraumatic, Normocephalic  EYES:  conjunctiva and sclera clear  NECK: Supple, No JVD  CHEST/LUNG: Clear to auscultation.; occasional fine crackles, no rhonchi, wheezing, or rubs  HEART: Regular rate and rhythm; No murmurs, rubs, or gallops  ABDOMEN: Soft, Nontender, Nondistended; Bowel sounds present  : Deleon catheter draining clear urine  NERVOUS SYSTEM:  Alert & Oriented X1,    EXTREMITIES:  2+ Peripheral Pulses, No clubbing, cyanosis, or edema  SKIN: warm dry                            12.6   14.46 )-----------( 205      ( 13 Apr 2022 07:28 )             37.6     04-13    148<H>  |  121<H>  |  71<H>  ----------------------------<  199<H>  3.8   |  17<L>  |  2.30<H>    Ca    9.1      13 Apr 2022 07:28  Phos  3.0     04-13  Mg     2.0     04-13    TPro  5.9<L>  /  Alb  1.8<L>  /  TBili  0.6  /  DBili  x   /  AST  15  /  ALT  35  /  AlkPhos  63  04-13    LIVER FUNCTIONS - ( 13 Apr 2022 07:28 )  Alb: 1.8 g/dL / Pro: 5.9 g/dL / ALK PHOS: 63 U/L / ALT: 35 U/L DA / AST: 15 U/L / GGT: x           CARDIAC MARKERS ( 12 Apr 2022 06:39 )  x     / x     / 179 U/L / x     / x              CAPILLARY BLOOD GLUCOSE      RADIOLOGY & ADDITIONAL TESTS:

## 2022-04-14 DIAGNOSIS — I35.0 NONRHEUMATIC AORTIC (VALVE) STENOSIS: ICD-10-CM

## 2022-04-14 LAB
% ALBUMIN: 48.2 % — SIGNIFICANT CHANGE UP
% ALPHA 1: 7.2 % — SIGNIFICANT CHANGE UP
% ALPHA 2: 13.4 % — SIGNIFICANT CHANGE UP
% BETA: 14.3 % — SIGNIFICANT CHANGE UP
% GAMMA: 16.9 % — SIGNIFICANT CHANGE UP
ALBUMIN SERPL ELPH-MCNC: 1.5 G/DL — LOW (ref 3.5–5)
ALBUMIN SERPL ELPH-MCNC: 2.8 G/DL — LOW (ref 3.6–5.5)
ALBUMIN/GLOB SERPL ELPH: 0.9 RATIO — SIGNIFICANT CHANGE UP
ALP SERPL-CCNC: 58 U/L — SIGNIFICANT CHANGE UP (ref 40–120)
ALPHA1 GLOB SERPL ELPH-MCNC: 0.4 G/DL — SIGNIFICANT CHANGE UP (ref 0.1–0.4)
ALPHA2 GLOB SERPL ELPH-MCNC: 0.8 G/DL — SIGNIFICANT CHANGE UP (ref 0.5–1)
ALT FLD-CCNC: 29 U/L DA — SIGNIFICANT CHANGE UP (ref 10–60)
ANION GAP SERPL CALC-SCNC: 7 MMOL/L — SIGNIFICANT CHANGE UP (ref 5–17)
AST SERPL-CCNC: 31 U/L — SIGNIFICANT CHANGE UP (ref 10–40)
B-GLOBULIN SERPL ELPH-MCNC: 0.8 G/DL — SIGNIFICANT CHANGE UP (ref 0.5–1)
BASOPHILS # BLD AUTO: 0.05 K/UL — SIGNIFICANT CHANGE UP (ref 0–0.2)
BASOPHILS NFR BLD AUTO: 0.3 % — SIGNIFICANT CHANGE UP (ref 0–2)
BILIRUB SERPL-MCNC: 0.7 MG/DL — SIGNIFICANT CHANGE UP (ref 0.2–1.2)
BUN SERPL-MCNC: 66 MG/DL — HIGH (ref 7–18)
CALCIUM SERPL-MCNC: 8.8 MG/DL — SIGNIFICANT CHANGE UP (ref 8.4–10.5)
CHLORIDE SERPL-SCNC: 118 MMOL/L — HIGH (ref 96–108)
CO2 SERPL-SCNC: 19 MMOL/L — LOW (ref 22–31)
CREAT SERPL-MCNC: 2.15 MG/DL — HIGH (ref 0.5–1.3)
EGFR: 21 ML/MIN/1.73M2 — LOW
EOSINOPHIL # BLD AUTO: 0.25 K/UL — SIGNIFICANT CHANGE UP (ref 0–0.5)
EOSINOPHIL NFR BLD AUTO: 1.7 % — SIGNIFICANT CHANGE UP (ref 0–6)
GAMMA GLOBULIN: 1 G/DL — SIGNIFICANT CHANGE UP (ref 0.6–1.6)
GLUCOSE BLDC GLUCOMTR-MCNC: 154 MG/DL — HIGH (ref 70–99)
GLUCOSE BLDC GLUCOMTR-MCNC: 157 MG/DL — HIGH (ref 70–99)
GLUCOSE BLDC GLUCOMTR-MCNC: 183 MG/DL — HIGH (ref 70–99)
GLUCOSE BLDC GLUCOMTR-MCNC: 303 MG/DL — HIGH (ref 70–99)
GLUCOSE SERPL-MCNC: 174 MG/DL — HIGH (ref 70–99)
HCT VFR BLD CALC: 37 % — SIGNIFICANT CHANGE UP (ref 34.5–45)
HGB BLD-MCNC: 12.6 G/DL — SIGNIFICANT CHANGE UP (ref 11.5–15.5)
IMM GRANULOCYTES NFR BLD AUTO: 0.7 % — SIGNIFICANT CHANGE UP (ref 0–1.5)
INTERPRETATION SERPL IFE-IMP: SIGNIFICANT CHANGE UP
LYMPHOCYTES # BLD AUTO: 13.6 % — SIGNIFICANT CHANGE UP (ref 13–44)
LYMPHOCYTES # BLD AUTO: 2.04 K/UL — SIGNIFICANT CHANGE UP (ref 1–3.3)
MAGNESIUM SERPL-MCNC: 1.9 MG/DL — SIGNIFICANT CHANGE UP (ref 1.6–2.6)
MCHC RBC-ENTMCNC: 32.5 PG — SIGNIFICANT CHANGE UP (ref 27–34)
MCHC RBC-ENTMCNC: 34.1 GM/DL — SIGNIFICANT CHANGE UP (ref 32–36)
MCV RBC AUTO: 95.4 FL — SIGNIFICANT CHANGE UP (ref 80–100)
MONOCYTES # BLD AUTO: 0.83 K/UL — SIGNIFICANT CHANGE UP (ref 0–0.9)
MONOCYTES NFR BLD AUTO: 5.5 % — SIGNIFICANT CHANGE UP (ref 2–14)
NEUTROPHILS # BLD AUTO: 11.7 K/UL — HIGH (ref 1.8–7.4)
NEUTROPHILS NFR BLD AUTO: 78.2 % — HIGH (ref 43–77)
NRBC # BLD: 0 /100 WBCS — SIGNIFICANT CHANGE UP (ref 0–0)
PHOSPHATE SERPL-MCNC: 3.2 MG/DL — SIGNIFICANT CHANGE UP (ref 2.5–4.5)
PLATELET # BLD AUTO: 260 K/UL — SIGNIFICANT CHANGE UP (ref 150–400)
POTASSIUM SERPL-MCNC: 4.4 MMOL/L — SIGNIFICANT CHANGE UP (ref 3.5–5.3)
POTASSIUM SERPL-SCNC: 4.4 MMOL/L — SIGNIFICANT CHANGE UP (ref 3.5–5.3)
PROT PATTERN SERPL ELPH-IMP: SIGNIFICANT CHANGE UP
PROT SERPL-MCNC: 5.6 G/DL — LOW (ref 6–8.3)
RBC # BLD: 3.88 M/UL — SIGNIFICANT CHANGE UP (ref 3.8–5.2)
RBC # FLD: 12.2 % — SIGNIFICANT CHANGE UP (ref 10.3–14.5)
SODIUM SERPL-SCNC: 144 MMOL/L — SIGNIFICANT CHANGE UP (ref 135–145)
WBC # BLD: 15.08 K/UL — HIGH (ref 3.8–10.5)
WBC # FLD AUTO: 15.08 K/UL — HIGH (ref 3.8–10.5)

## 2022-04-14 PROCEDURE — 99233 SBSQ HOSP IP/OBS HIGH 50: CPT

## 2022-04-14 PROCEDURE — 71250 CT THORAX DX C-: CPT | Mod: 26

## 2022-04-14 RX ORDER — CEFTRIAXONE 500 MG/1
2 INJECTION, POWDER, FOR SOLUTION INTRAMUSCULAR; INTRAVENOUS
Qty: 48 | Refills: 0
Start: 2022-04-14 | End: 2022-05-07

## 2022-04-14 RX ADMIN — AMLODIPINE BESYLATE 10 MILLIGRAM(S): 2.5 TABLET ORAL at 05:20

## 2022-04-14 RX ADMIN — Medication 100 MILLIGRAM(S): at 05:45

## 2022-04-14 RX ADMIN — Medication 1: at 12:16

## 2022-04-14 RX ADMIN — APIXABAN 2.5 MILLIGRAM(S): 2.5 TABLET, FILM COATED ORAL at 05:20

## 2022-04-14 RX ADMIN — OLANZAPINE 2.5 MILLIGRAM(S): 15 TABLET, FILM COATED ORAL at 21:45

## 2022-04-14 RX ADMIN — Medication 4: at 21:44

## 2022-04-14 RX ADMIN — APIXABAN 2.5 MILLIGRAM(S): 2.5 TABLET, FILM COATED ORAL at 21:44

## 2022-04-14 RX ADMIN — TAMSULOSIN HYDROCHLORIDE 0.4 MILLIGRAM(S): 0.4 CAPSULE ORAL at 21:45

## 2022-04-14 RX ADMIN — CEFTRIAXONE 100 MILLIGRAM(S): 500 INJECTION, POWDER, FOR SOLUTION INTRAMUSCULAR; INTRAVENOUS at 09:13

## 2022-04-14 RX ADMIN — Medication 1: at 17:49

## 2022-04-14 RX ADMIN — CITALOPRAM 20 MILLIGRAM(S): 10 TABLET, FILM COATED ORAL at 13:49

## 2022-04-14 NOTE — PROGRESS NOTE ADULT - PROBLEM SELECTOR PLAN 5
Pt with new mild O2 requirements  CXR  wet read looks like fluid overload; however, aspiration PNA cannot be r/o (still waiting for final read)  TTE (4/13) EF 40-45%, GIDD, hypokinetic luis-septum, distal anterior wall and apex; severe AS  CTC (4/14) ground glass densities on RU & R middle lober (edema v/s infection), also small L pleural effusion  Procal 10  Likely due to CHF exacerbation w/ also possible PNA given elevated procal  given no significant clinical signs of fluid overload and severe AS will hold lasix  pt currently off supplemental O2  c/w ceftriaxone  d/c flagyl

## 2022-04-14 NOTE — PROGRESS NOTE ADULT - PROBLEM SELECTOR PLAN 10
Patient per Kyle is on losartan 100mg daily  Given CHAZ on amlodipine 10 mg daily  BP controlled  continue w/ amlodipine 10 mg oral daily w/ parameters Patient with chronic atrial fibrillation per chart review with [id number] per prior cardiology note. Was admitted to [facility] in 2018 for acute CVA  Continue Eliquis 2.5mg po BID  Does not appear to currently be on a BB, so far not requiring rate controlled medication

## 2022-04-14 NOTE — PROGRESS NOTE ADULT - ASSESSMENT
UTI - with E coli  Bacteremia - with group B strep- repeat cultures are negative  Leukocytosis    Plan - Continue Rocephin 2gm iv q24hrs  Continue Flagyl 500mg iv q8 UTI - with E coli  Bacteremia - with group B strep- repeat cultures are negative  Leukocytosis    Plan - Continue Rocephin 2gm iv q24hrs   UTI - with E coli  Bacteremia - with group B strep- repeat cultures are negative  Leukocytosis    Plan - Continue Rocephin 2gm iv q24hrs  will give abxs for 28 days post 1st negative blood culture, as we do not know the source of her bacteremia and do not want to subject her to a ROSI at her age.  will need an extended dwell

## 2022-04-14 NOTE — PROGRESS NOTE ADULT - PROBLEM SELECTOR PLAN 1
Patient with grossly positive UA  s/p cefepime and NS 4L in ED  UCX (4/9) E.coli  sensitive to ceftriaxone  c/w ceftriaxone-today D5  ID, Dr. Arnold, consulted

## 2022-04-14 NOTE — PROGRESS NOTE ADULT - PROBLEM SELECTOR PLAN 4
- Hx of atrophic kidney  - noted to have Cr 3.54 (unknown baseline)  - s/p NS 4L bolus in ED  - CT A/P atrophic (L) Kidney  - SCr 2.9>3.04>3.11>2.9>2.3>2.15; improving  - u-elytes showed FeNA 3.6 % (intrinsic)  - CHAZ likely d/t ATN in the s/o infection  - will try to obtain records from PCP to know what her baseline SCr is; attempted to call  PCP X2, unable to reach dr. Richardson  - No need for renal US as per Dr. Hanna

## 2022-04-14 NOTE — PROGRESS NOTE ADULT - SUBJECTIVE AND OBJECTIVE BOX
89y Female is under our care for     REVIEW OF SYSTEMS:  [  ] Not able to elicit  General:	  Chest:	  GI:	  :  Skin:	  Musculoskeletal:	  Neuro:	    MEDS:  cefTRIAXone   IVPB 2000 milliGRAM(s) IV Intermittent every 24 hours  metroNIDAZOLE  IVPB 500 milliGRAM(s) IV Intermittent every 8 hours    ALLERGIES: Allergies    No Known Allergies    Intolerances        VITALS:  Vital Signs Last 24 Hrs  T(C): 36.7 (14 Apr 2022 04:53), Max: 37.3 (13 Apr 2022 21:59)  T(F): 98 (14 Apr 2022 04:53), Max: 99.2 (13 Apr 2022 21:59)  HR: 77 (14 Apr 2022 04:53) (77 - 91)  BP: 132/65 (14 Apr 2022 04:53) (132/65 - 167/61)  BP(mean): --  RR: 18 (14 Apr 2022 04:53) (16 - 18)  SpO2: 99% (14 Apr 2022 04:53) (95% - 99%)      PHYSICAL EXAM:  HEENT:  Neck:  Respiratory:  Cardiovascular:  Gastrointestinal:  Extremities:  Skin:  Ortho:  Neuro:    LABS/DIAGNOSTIC TESTS:                        12.6   15.08 )-----------( 260      ( 14 Apr 2022 06:33 )             37.0     WBC Count: 15.08 K/uL (04-14 @ 06:33)  WBC Count: 14.46 K/uL (04-13 @ 07:28)  WBC Count: 11.46 K/uL (04-12 @ 06:39)  WBC Count: 16.77 K/uL (04-11 @ 06:13)  WBC Count: 26.86 K/uL (04-10 @ 05:59)    04-14    144  |  118<H>  |  66<H>  ----------------------------<  174<H>  4.4   |  19<L>  |  2.15<H>    Ca    8.8      14 Apr 2022 06:33  Phos  3.2     04-14  Mg     1.9     04-14    TPro  5.6<L>  /  Alb  1.5<L>  /  TBili  0.7  /  DBili  x   /  AST  31  /  ALT  29  /  AlkPhos  58  04-14      CULTURES:   .Blood Blood-Peripheral  04-11 @ 11:04   No growth to date.  --  --  < from: CT Chest No Cont (04.14.22 @ 10:10) >    ACC: [id number] EXAM:  CT CHEST                          PROCEDURE DATE:  04/14/2022          INTERPRETATION:  CLINICAL INFORMATION: Assess for pneumonia    TECHNIQUE:  A volumetric CT acquisition of the chest was obtained from   the thoracic inlet to the upper abdomen, without the administration  of   intravenous contrast. Coronal and sagittal multiplanar reformations were   also submitted.    Comparison: No prior chest CT available for comparison    FINDINGS:    Lungs/Airways/Pleura: Expiratorystudy degraded by respiratory motion.   Retained secretions in the trachea extending into the mainstem bronchi.   Extensive high density nodularity within atelectatic part of the   dependent lower lobes. There are patchy groundglass densities in the   right upper and right middle lobes There are small right greater than   left pleural effusions.    Mediastinum/Lymph nodes: There is a mildly enlarged partially calcified   subcarinal lymph node    Heart and Vessels: The heart is enlarged. CABG has been performed. No   pericardial effusion. There is mitral annular calcification. The mid   ascending aorta measures 3.6 cm.    Upper Abdomen: There are surgical clips in the upper anterior abdomen    Osseous structures and Soft Tissues: Median sternotomy has been   performed. There is diffuse qualitative osteopenia and multilevel   discogenic disease in the spine    IMPRESSION:    1.  Ill-defined groundglass densities in the right upper and right middle   lobes, considerations include asymmetric edema or infection    2.  Small right greater than left pleural effusions    --- End of Report ---             AUGUSTA SHER M.D., Attending Radiologist  This document has been electronically signed. Apr 14 2022 10:56AM    < end of copied text >      .Blood Blood  04-09 @ 18:52   Growth in aerobic and anaerobic bottles: Streptococcus agalactiae (Group  B)  --  Streptococcus agalactiae (Group B)      Clean Catch Clean Catch (Midstream)  04-09 @ 18:40   >100,000 CFU/ml Escherichia coli  --  Escherichia coli        RADIOLOGY:     89y Female is under our care for UTI, bacteremia and possible aspiration pneumonia.  Patient was seen laying comfortably in bed with no acute distress on room air.  She denies any cough or shortness of breath at this time, remains afebrile and repeat blood cultures are negative.    REVIEW OF SYSTEMS:  [  ] Not able to elicit  General: no fevers no malaise  Chest: no cough no sob  GI: no nvd  : no urinary sxs   Skin: no rashes  Musculoskeletal: no trauma no LBP  Neuro: no ha's no dizziness     MEDS:  cefTRIAXone   IVPB 2000 milliGRAM(s) IV Intermittent every 24 hours  metroNIDAZOLE  IVPB 500 milliGRAM(s) IV Intermittent every 8 hours    ALLERGIES: Allergies    No Known Allergies    Intolerances        VITALS:  Vital Signs Last 24 Hrs  T(C): 36.7 (14 Apr 2022 04:53), Max: 37.3 (13 Apr 2022 21:59)  T(F): 98 (14 Apr 2022 04:53), Max: 99.2 (13 Apr 2022 21:59)  HR: 77 (14 Apr 2022 04:53) (77 - 91)  BP: 132/65 (14 Apr 2022 04:53) (132/65 - 167/61)  BP(mean): --  RR: 18 (14 Apr 2022 04:53) (16 - 18)  SpO2: 99% (14 Apr 2022 04:53) (95% - 99%)      PHYSICAL EXAM:  HEENT: n/a  Neck: supple no LN's   Respiratory: lungs clear no rales  Cardiovascular: S1 S2 reg no murmurs  Gastrointestinal: +BS with soft, nondistended abdomen; nontender  Extremities: no edema  Skin: no rashes  Ortho: n/a  Neuro: AAO x 2-3      LABS/DIAGNOSTIC TESTS:                        12.6   15.08 )-----------( 260      ( 14 Apr 2022 06:33 )             37.0     WBC Count: 15.08 K/uL (04-14 @ 06:33)  WBC Count: 14.46 K/uL (04-13 @ 07:28)  WBC Count: 11.46 K/uL (04-12 @ 06:39)  WBC Count: 16.77 K/uL (04-11 @ 06:13)  WBC Count: 26.86 K/uL (04-10 @ 05:59)    04-14    144  |  118<H>  |  66<H>  ----------------------------<  174<H>  4.4   |  19<L>  |  2.15<H>    Ca    8.8      14 Apr 2022 06:33  Phos  3.2     04-14  Mg     1.9     04-14    TPro  5.6<L>  /  Alb  1.5<L>  /  TBili  0.7  /  DBili  x   /  AST  31  /  ALT  29  /  AlkPhos  58  04-14      CULTURES:   .Blood Blood-Peripheral  04-11 @ 11:04   No growth to date.  --  --  < from: CT Chest No Cont (04.14.22 @ 10:10) >    ACC: [id number] EXAM:  CT CHEST                          PROCEDURE DATE:  04/14/2022          INTERPRETATION:  CLINICAL INFORMATION: Assess for pneumonia    TECHNIQUE:  A volumetric CT acquisition of the chest was obtained from   the thoracic inlet to the upper abdomen, without the administration  of   intravenous contrast. Coronal and sagittal multiplanar reformations were   also submitted.    Comparison: No prior chest CT available for comparison    FINDINGS:    Lungs/Airways/Pleura: Expiratorystudy degraded by respiratory motion.   Retained secretions in the trachea extending into the mainstem bronchi.   Extensive high density nodularity within atelectatic part of the   dependent lower lobes. There are patchy groundglass densities in the   right upper and right middle lobes There are small right greater than   left pleural effusions.    Mediastinum/Lymph nodes: There is a mildly enlarged partially calcified   subcarinal lymph node    Heart and Vessels: The heart is enlarged. CABG has been performed. No   pericardial effusion. There is mitral annular calcification. The mid   ascending aorta measures 3.6 cm.    Upper Abdomen: There are surgical clips in the upper anterior abdomen    Osseous structures and Soft Tissues: Median sternotomy has been   performed. There is diffuse qualitative osteopenia and multilevel   discogenic disease in the spine    IMPRESSION:    1.  Ill-defined groundglass densities in the right upper and right middle   lobes, considerations include asymmetric edema or infection    2.  Small right greater than left pleural effusions    --- End of Report ---             AUGUSTA SHER M.D., Attending Radiologist  This document has been electronically signed. Apr 14 2022 10:56AM    < end of copied text >      .Blood Blood  04-09 @ 18:52   Growth in aerobic and anaerobic bottles: Streptococcus agalactiae (Group  B)  --  Streptococcus agalactiae (Group B)      Clean Catch Clean Catch (Midstream)  04-09 @ 18:40   >100,000 CFU/ml Escherichia coli  --  Escherichia coli        RADIOLOGY:

## 2022-04-14 NOTE — PROGRESS NOTE ADULT - SUBJECTIVE AND OBJECTIVE BOX
PGY-1 Progress Note discussed with attending    PAGER #: [[phone]] TILL 5:00 PM  PLEASE CONTACT ON CALL TEAM:  - On Call Team (Please refer to Migue) FROM 5:00 PM - 8:30PM  - Nightfloat Team FROM 8:30 -7:30 AM        INTERVAL HPI/OVERNIGHT EVENTS:       REVIEW OF SYSTEMS:  CONSTITUTIONAL: No fever, weight loss, or fatigue  RESPIRATORY: No cough, wheezing, chills or hemoptysis; No shortness of breath  CARDIOVASCULAR: No chest pain, palpitations, dizziness, or leg swelling  GASTROINTESTINAL: No abdominal pain. No nausea, vomiting, or hematemesis; No diarrhea or constipation. No melena or hematochezia.  GENITOURINARY: No dysuria or hematuria, urinary frequency  NEUROLOGICAL: No headaches, memory loss, loss of strength, numbness, or tremors  SKIN: No itching, burning, rashes, or lesions     Vital Signs Last 24 Hrs  T(C): 36.7 (14 Apr 2022 04:53), Max: 37.3 (13 Apr 2022 21:59)  T(F): 98 (14 Apr 2022 04:53), Max: 99.2 (13 Apr 2022 21:59)  HR: 77 (14 Apr 2022 04:53) (77 - 91)  BP: 132/65 (14 Apr 2022 04:53) (132/65 - 167/61)  BP(mean): --  RR: 18 (14 Apr 2022 04:53) (16 - 18)  SpO2: 99% (14 Apr 2022 04:53) (95% - 99%)    PHYSICAL EXAMINATION:  GENERAL: NAD, well built  HEAD:  Atraumatic, Normocephalic  EYES:  conjunctiva and sclera clear  NECK: Supple, No JVD, Normal thyroid  CHEST/LUNG: Clear to auscultation. Clear to percussion bilaterally; No rales, rhonchi, wheezing, or rubs  HEART: Regular rate and rhythm; No murmurs, rubs, or gallops  ABDOMEN: Soft, Nontender, Nondistended; Bowel sounds present  NERVOUS SYSTEM:  Alert & Oriented X3,    EXTREMITIES:  2+ Peripheral Pulses, No clubbing, cyanosis, or edema  SKIN: warm dry                          12.6   15.08 )-----------( 260      ( 14 Apr 2022 06:33 )             37.0     04-14    144  |  118<H>  |  66<H>  ----------------------------<  174<H>  4.4   |  19<L>  |  2.15<H>    Ca    8.8      14 Apr 2022 06:33  Phos  3.2     04-14  Mg     1.9     04-14    TPro  5.6<L>  /  Alb  1.5<L>  /  TBili  0.7  /  DBili  x   /  AST  31  /  ALT  29  /  AlkPhos  58  04-14    LIVER FUNCTIONS - ( 14 Apr 2022 06:33 )  Alb: 1.5 g/dL / Pro: 5.6 g/dL / ALK PHOS: 58 U/L / ALT: 29 U/L DA / AST: 31 U/L / GGT: x                   CAPILLARY BLOOD GLUCOSE      RADIOLOGY & ADDITIONAL TESTS:                   PGY-1 Progress Note discussed with attending    PAGER #: [[phone]] TILL 5:00 PM  PLEASE CONTACT ON CALL TEAM:  - On Call Team (Please refer to Migue) FROM 5:00 PM - 8:30PM  - Nightfloat Team FROM 8:30 -7:30 AM        INTERVAL HPI/OVERNIGHT EVENTS:  no acute overnight events. Pt seen and examined at bedtime, calmed, receiving oral feeds from PCA. Speaking in Albanian and english, cannot assess for ROS.     Vital Signs Last 24 Hrs  T(C): 36.7 (14 Apr 2022 04:53), Max: 37.3 (13 Apr 2022 21:59)  T(F): 98 (14 Apr 2022 04:53), Max: 99.2 (13 Apr 2022 21:59)  HR: 77 (14 Apr 2022 04:53) (77 - 91)  BP: 132/65 (14 Apr 2022 04:53) (132/65 - 167/61)  RR: 18 (14 Apr 2022 04:53) (16 - 18)  SpO2: 99% (14 Apr 2022 04:53) (95% - 99%)      PHYSICAL EXAMINATION:  GENERAL: NAD, well built  HEAD:  Atraumatic, Normocephalic  EYES:  conjunctiva and sclera clear  NECK: Supple, No JVD  CHEST/LUNG: Clear to auscultation.; occasional fine crackles, no rhonchi, wheezing, or rubs  HEART: Regular rate and rhythm; No murmurs, rubs, or gallops  ABDOMEN: Soft, Nontender, Nondistended; Bowel sounds present  : Deleon catheter draining clear urine  NERVOUS SYSTEM:  Alert & Oriented X1,    EXTREMITIES:  2+ Peripheral Pulses, No clubbing, cyanosis, or edema  SKIN: warm dry                          12.6   15.08 )-----------( 260      ( 14 Apr 2022 06:33 )             37.0     04-14    144  |  118<H>  |  66<H>  ----------------------------<  174<H>  4.4   |  19<L>  |  2.15<H>    Ca    8.8      14 Apr 2022 06:33  Phos  3.2     04-14  Mg     1.9     04-14    TPro  5.6<L>  /  Alb  1.5<L>  /  TBili  0.7  /  DBili  x   /  AST  31  /  ALT  29  /  AlkPhos  58  04-14    LIVER FUNCTIONS - ( 14 Apr 2022 06:33 )  Alb: 1.5 g/dL / Pro: 5.6 g/dL / ALK PHOS: 58 U/L / ALT: 29 U/L DA / AST: 31 U/L / GGT: x                   CAPILLARY BLOOD GLUCOSE      RADIOLOGY & ADDITIONAL TESTS:

## 2022-04-14 NOTE — PROGRESS NOTE ADULT - PROBLEM SELECTOR PLAN 11
- p/a fall per ED chart review  - CTH unremarkable  - PT recommends KERMIT Patient per Kyle is on losartan 100mg daily  Given CHAZ on amlodipine 10 mg daily  BP controlled  continue w/ amlodipine 10 mg oral daily w/ parameters

## 2022-04-14 NOTE — PROGRESS NOTE ADULT - PROBLEM SELECTOR PLAN 9
Patient with chronic atrial fibrillation per chart review with [id number] per prior cardiology note. Was admitted to [facility] in 2018 for acute CVA  Continue Eliquis 2.5mg po BID  Does not appear to currently be on a BB, so far not requiring rate controlled medication Pt with urinary retention  Bladder scan showed ~600 cc urine  (4/11)-straight cath ~3PM today, obtaining 1000 cc of urine  Deleon catheter placed on 4/11  c/w Deleon  c/w Flomax   potentially attempt TOV  tomorrow

## 2022-04-14 NOTE — PROGRESS NOTE ADULT - SUBJECTIVE AND OBJECTIVE BOX
[facility] NEPHROLOGY- PROGRESS NOTE    Patient is a 89yo Female with unknown PMH, presents with weakness and fall per ED chart. Admitted for fall found to have UTI and GBS bacteremia. Nephrology consulted for Elevated serum creatinine.    Hospital Medications: Medications reviewed.  REVIEW OF SYSTEMS:   Limited; denies any SOB or chest pain but unable to comprehend further speech.     VITALS:  T(F): 98.9 (22 @ 14:03), Max: 99.2 (22 @ 21:59)  HR: 86 (22 @ 14:03)  BP: 129/74 (22 @ 14:03)  RR: 18 (22 @ 14:03)  SpO2: 98% (22 @ 14:03)  Wt(kg): --     @ 07:01  -   @ 07:00  --------------------------------------------------------  IN: 0 mL / OUT: 1050 mL / NET: -1050 mL     @ 07:01  -   @ 14:12  --------------------------------------------------------  IN: 0 mL / OUT: 350 mL / NET: -350 mL        PHYSICAL EXAM:  Constitutional: NAD  HEENT: anicteric sclera,   Respiratory: CTA ant  Cardiovascular: S1, S2, RRR  Gastrointestinal: BS+, soft, NT/ND  : +acosta with clear urine  Extremities: No peripheral edema    LABS:      144  |  118<H>  |  66<H>  ----------------------------<  174<H>  4.4   |  19<L>  |  2.15<H>    Ca    8.8      2022 06:33  Phos  3.2       Mg     1.9         TPro  5.6<L>  /  Alb  1.5<L>  /  TBili  0.7  /  DBili      /  AST  31  /  ALT  29  /  AlkPhos  58  -14    Creatinine Trend: 2.15 <--, 2.30 <--, 2.93 <--, 3.11 <--, 3.04 <--, 2.94 <--, 3.54 <--                        12.6   15.08 )-----------( 260      ( 2022 06:33 )             37.0     Urine Studies:  Urinalysis Basic - ( 2022 14:50 )    Color: Yellow / Appearance: Slightly Turbid / S.015 / pH:   Gluc:  / Ketone: Negative  / Bili: Negative / Urobili: Negative   Blood:  / Protein: 500 mg/dL / Nitrite: Negative   Leuk Esterase: Moderate / RBC: 5-10 /HPF / WBC >50 /HPF   Sq Epi:  / Non Sq Epi: Few /HPF / Bacteria: TNTC /HPF      Sodium, Random Urine: 68 mmol/L ( @ 02:18)  Creatinine, Random Urine: 39 mg/dL ( @ 02:18)  Chloride, Random Urine: 67 mmol/L ( @ 02:18)

## 2022-04-14 NOTE — PROGRESS NOTE ADULT - ASSESSMENT
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

## 2022-04-14 NOTE — PROGRESS NOTE ADULT - SUBJECTIVE AND OBJECTIVE BOX
Interval Events: patient remains confused, no overnight events       Allergies    No Known Allergies    Intolerances      Endocrine/Metabolic Medications:  dextrose 50% Injectable 25 Gram(s) IV Push once  dextrose Oral Gel 15 Gram(s) Oral once PRN  glucagon  Injectable 1 milliGRAM(s) IntraMuscular once  insulin lispro (ADMELOG) corrective regimen sliding scale   SubCutaneous Before meals and at bedtime      Vital Signs Last 24 Hrs  T(C): 36.7 (14 Apr 2022 04:53), Max: 37.3 (13 Apr 2022 21:59)  T(F): 98 (14 Apr 2022 04:53), Max: 99.2 (13 Apr 2022 21:59)  HR: 77 (14 Apr 2022 04:53) (77 - 91)  BP: 132/65 (14 Apr 2022 04:53) (132/65 - 167/61)  BP(mean): --  RR: 18 (14 Apr 2022 04:53) (16 - 18)  SpO2: 99% (14 Apr 2022 04:53) (95% - 99%)      PHYSICAL EXAM  All physical exam findings normal, except those marked:  General:	Alert, active, cooperative, NAD, well hydrated  .		[] Abnormal:  Neck		Normal: supple, no cervical adenopathy, no palpable thyroid  .		[] Abnormal:  Cardiovascular	Normal: regular rate, normal S1, S2, no murmurs  .		[] Abnormal:  Respiratory	Normal: no chest wall deformity, normal respiratory pattern, CTA B/L  .		[] Abnormal:  Abdominal	Normal: soft, ND, NT, bowel sounds present, no masses, no organomegaly  .		[] Abnormal:  		Normal normal genitalia, testes descended, circumcised/uncircumcised  .		Benito stage:			Breast benito:  .		Menstrual history:  .		[] Abnormal:  Extremities	Normal: FROM x4  .		[] Abnormal:  Skin		Normal: intact and not indurated, no rash, no acanthosis nigricans  .		[] Abnormal:  Neurologic	Normal: grossly intact  .		[] Abnormal:    LABS                        12.6   15.08 )-----------( 260      ( 14 Apr 2022 06:33 )             37.0                               144    |  118    |  66                  Calcium: 8.8   / iCa: x      (04-14 @ 06:33)    ----------------------------<  174       Magnesium: 1.9                              4.4     |  19     |  2.15             Phosphorous: 3.2      TPro  5.6    /  Alb  1.5    /  TBili  0.7    /  DBili  x      /  AST  31     /  ALT  29     /  AlkPhos  58     14 Apr 2022 06:33    CAPILLARY BLOOD GLUCOSE      POCT Blood Glucose.: 157 mg/dL (14 Apr 2022 11:37)  POCT Blood Glucose.: 154 mg/dL (14 Apr 2022 07:47)  POCT Blood Glucose.: 197 mg/dL (13 Apr 2022 21:24)  POCT Blood Glucose.: 126 mg/dL (13 Apr 2022 16:51)

## 2022-04-14 NOTE — PROGRESS NOTE ADULT - PROBLEM SELECTOR PLAN 2
-BCX (4/9) Step. Agalactiae X 2  -Source unclear. ?Urine? Patient with known colo-vesicular fistula per discussion with son, Jose  -May be contaminant as per ID  -Lactate 2.5->1.5  -WBC slightly uptrending  -repeat BCX (4/11) NGTD X 2  -c/w ceftriaxone D5  -As per ID pt will need to be on Ceftriaxone 1 gr IV daily X 28 days  -Extended dwell tomorrow by IR

## 2022-04-14 NOTE — PROGRESS NOTE ADULT - ATTENDING COMMENTS
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   #Chronic atrial fibrillation  #Urinary retention s/p acosta 4/12  #HFreF TTE with EF 48%  #Severe aortic stenosis on TTE  - repeat blood cx NGTD  - plan for extended dwell, IR consulted  - will need 4 week of IV abx with ceftriaxone per ID  - CT chest result pending but CXR with diffuse airspace disease   - sodium is improving with IVF, hold off on IVF  - CHAZ improving, ctm BMP  - no longer on oxygen on room air  - attempt TOV tomorrow  - nephrology following for CHAZ workup   - continue eliquis  - f/u ionized ca and vit D due to concern for brown tumors associated with hyperparathyroidism . 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   #Chronic atrial fibrillation  #Urinary retention s/p acosta 4/12  #HFreF TTE with EF 48%  #Severe aortic stenosis on TTE  - repeat blood cx NGTD  - plan for extended dwell, IR consulted  - will need 4 week of IV abx with ceftriaxone per ID  - CT chest result pending but CXR with diffuse airspace disease   - sodium is improving with IVF, hold off on IVF  - CHAZ improving, ctm BMP  - no longer on oxygen on room air  - attempt TOV tomorrow  - nephrology following for CHAZ workup   - continue eliquis  - hyperparathyroidism 2/2 vit d deficiency on ergocalceferl for 8 weeks, endo following

## 2022-04-14 NOTE — PROGRESS NOTE ADULT - PROBLEM SELECTOR PLAN 7
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 Hdez on board Likely hypovolemic hypernatremia giving BUN also trending up  previously on NS 0.45--> switched to D5  Na 148>151>148>144  resolved  repeat BMP tomorrow

## 2022-04-14 NOTE — PROGRESS NOTE ADULT - PROBLEM SELECTOR PLAN 8
Pt with urinary retention  Bladder scan showed ~600 cc urine  (4/11)-straight cath ~3PM today, obtaining 1000 cc of urine  Deleon catheter placed on 4/11  c/w Deleon  c/w Flomax   potentially attempt TOV  tomorrow - noted to have scattered osseous lucencies most prominent in the iliac bones and T11   vertebral body  - ddx: brown tumor  - PTH elevated at 170  - Vit D levels at 19; ionized Ca nl; this is c/w hyperparathyroidism 2/2 vit D deficiency  - c/w oral Vit D supplementation X 8-10 wks as per deepak  - Dr. Hdez on board

## 2022-04-15 ENCOUNTER — TRANSCRIPTION ENCOUNTER (OUTPATIENT)
Age: 87
End: 2022-04-15

## 2022-04-15 VITALS
SYSTOLIC BLOOD PRESSURE: 157 MMHG | OXYGEN SATURATION: 95 % | HEART RATE: 93 BPM | TEMPERATURE: 99 F | DIASTOLIC BLOOD PRESSURE: 81 MMHG | RESPIRATION RATE: 17 BRPM

## 2022-04-15 DIAGNOSIS — I50.22 CHRONIC SYSTOLIC (CONGESTIVE) HEART FAILURE: ICD-10-CM

## 2022-04-15 LAB
GLUCOSE BLDC GLUCOMTR-MCNC: 142 MG/DL — HIGH (ref 70–99)
GLUCOSE BLDC GLUCOMTR-MCNC: 148 MG/DL — HIGH (ref 70–99)
GLUCOSE BLDC GLUCOMTR-MCNC: 172 MG/DL — HIGH (ref 70–99)
GLUCOSE BLDC GLUCOMTR-MCNC: 249 MG/DL — HIGH (ref 70–99)
GLUCOSE BLDC GLUCOMTR-MCNC: 286 MG/DL — HIGH (ref 70–99)
SARS-COV-2 RNA SPEC QL NAA+PROBE: SIGNIFICANT CHANGE UP

## 2022-04-15 PROCEDURE — 84540 ASSAY OF URINE/UREA-N: CPT

## 2022-04-15 PROCEDURE — 99239 HOSP IP/OBS DSCHRG MGMT >30: CPT

## 2022-04-15 PROCEDURE — 80053 COMPREHEN METABOLIC PANEL: CPT

## 2022-04-15 PROCEDURE — 85025 COMPLETE CBC W/AUTO DIFF WBC: CPT

## 2022-04-15 PROCEDURE — 36415 COLL VENOUS BLD VENIPUNCTURE: CPT

## 2022-04-15 PROCEDURE — 86334 IMMUNOFIX E-PHORESIS SERUM: CPT

## 2022-04-15 PROCEDURE — 0225U NFCT DS DNA&RNA 21 SARSCOV2: CPT

## 2022-04-15 PROCEDURE — 71045 X-RAY EXAM CHEST 1 VIEW: CPT

## 2022-04-15 PROCEDURE — 87186 SC STD MICRODIL/AGAR DIL: CPT

## 2022-04-15 PROCEDURE — 82310 ASSAY OF CALCIUM: CPT

## 2022-04-15 PROCEDURE — 80048 BASIC METABOLIC PNL TOTAL CA: CPT

## 2022-04-15 PROCEDURE — 74176 CT ABD & PELVIS W/O CONTRAST: CPT | Mod: ME

## 2022-04-15 PROCEDURE — 82962 GLUCOSE BLOOD TEST: CPT

## 2022-04-15 PROCEDURE — 87181 SC STD AGAR DILUTION PER AGT: CPT

## 2022-04-15 PROCEDURE — 87150 DNA/RNA AMPLIFIED PROBE: CPT

## 2022-04-15 PROCEDURE — 82550 ASSAY OF CK (CPK): CPT

## 2022-04-15 PROCEDURE — 87040 BLOOD CULTURE FOR BACTERIA: CPT

## 2022-04-15 PROCEDURE — 83605 ASSAY OF LACTIC ACID: CPT

## 2022-04-15 PROCEDURE — 84100 ASSAY OF PHOSPHORUS: CPT

## 2022-04-15 PROCEDURE — 84145 PROCALCITONIN (PCT): CPT

## 2022-04-15 PROCEDURE — 70450 CT HEAD/BRAIN W/O DYE: CPT | Mod: ME

## 2022-04-15 PROCEDURE — 71250 CT THORAX DX C-: CPT

## 2022-04-15 PROCEDURE — G1004: CPT

## 2022-04-15 PROCEDURE — 84155 ASSAY OF PROTEIN SERUM: CPT

## 2022-04-15 PROCEDURE — 85027 COMPLETE CBC AUTOMATED: CPT

## 2022-04-15 PROCEDURE — 99285 EMERGENCY DEPT VISIT HI MDM: CPT

## 2022-04-15 PROCEDURE — 82306 VITAMIN D 25 HYDROXY: CPT

## 2022-04-15 PROCEDURE — 87086 URINE CULTURE/COLONY COUNT: CPT

## 2022-04-15 PROCEDURE — 93005 ELECTROCARDIOGRAM TRACING: CPT

## 2022-04-15 PROCEDURE — 87077 CULTURE AEROBIC IDENTIFY: CPT

## 2022-04-15 PROCEDURE — 82330 ASSAY OF CALCIUM: CPT

## 2022-04-15 PROCEDURE — 87184 SC STD DISK METHOD PER PLATE: CPT

## 2022-04-15 PROCEDURE — 83880 ASSAY OF NATRIURETIC PEPTIDE: CPT

## 2022-04-15 PROCEDURE — 84300 ASSAY OF URINE SODIUM: CPT

## 2022-04-15 PROCEDURE — 83690 ASSAY OF LIPASE: CPT

## 2022-04-15 PROCEDURE — 93306 TTE W/DOPPLER COMPLETE: CPT

## 2022-04-15 PROCEDURE — 97162 PT EVAL MOD COMPLEX 30 MIN: CPT

## 2022-04-15 PROCEDURE — 83970 ASSAY OF PARATHORMONE: CPT

## 2022-04-15 PROCEDURE — 84165 PROTEIN E-PHORESIS SERUM: CPT

## 2022-04-15 PROCEDURE — 96365 THER/PROPH/DIAG IV INF INIT: CPT

## 2022-04-15 PROCEDURE — 82570 ASSAY OF URINE CREATININE: CPT

## 2022-04-15 PROCEDURE — 83036 HEMOGLOBIN GLYCOSYLATED A1C: CPT

## 2022-04-15 PROCEDURE — 81001 URINALYSIS AUTO W/SCOPE: CPT

## 2022-04-15 PROCEDURE — 87635 SARS-COV-2 COVID-19 AMP PRB: CPT

## 2022-04-15 PROCEDURE — 83735 ASSAY OF MAGNESIUM: CPT

## 2022-04-15 PROCEDURE — 82436 ASSAY OF URINE CHLORIDE: CPT

## 2022-04-15 RX ORDER — ERGOCALCIFEROL 1.25 MG/1
50000 CAPSULE ORAL
Qty: 8 | Refills: 0
Start: 2022-04-15 | End: 2022-07-03

## 2022-04-15 RX ORDER — OLANZAPINE 15 MG/1
1 TABLET, FILM COATED ORAL
Qty: 0 | Refills: 0 | DISCHARGE
Start: 2022-04-15

## 2022-04-15 RX ORDER — CHLORHEXIDINE GLUCONATE 213 G/1000ML
1 SOLUTION TOPICAL
Refills: 0 | Status: DISCONTINUED | OUTPATIENT
Start: 2022-04-15 | End: 2022-04-15

## 2022-04-15 RX ORDER — TAMSULOSIN HYDROCHLORIDE 0.4 MG/1
1 CAPSULE ORAL
Qty: 0 | Refills: 0 | DISCHARGE
Start: 2022-04-15

## 2022-04-15 RX ORDER — AMLODIPINE BESYLATE 2.5 MG/1
1 TABLET ORAL
Qty: 0 | Refills: 0 | DISCHARGE
Start: 2022-04-15

## 2022-04-15 RX ORDER — SODIUM CHLORIDE 9 MG/ML
10 INJECTION INTRAMUSCULAR; INTRAVENOUS; SUBCUTANEOUS
Refills: 0 | Status: DISCONTINUED | OUTPATIENT
Start: 2022-04-15 | End: 2022-04-15

## 2022-04-15 RX ORDER — LOSARTAN POTASSIUM 100 MG/1
1 TABLET, FILM COATED ORAL
Qty: 0 | Refills: 0 | DISCHARGE

## 2022-04-15 RX ADMIN — APIXABAN 2.5 MILLIGRAM(S): 2.5 TABLET, FILM COATED ORAL at 18:36

## 2022-04-15 RX ADMIN — CITALOPRAM 20 MILLIGRAM(S): 10 TABLET, FILM COATED ORAL at 12:43

## 2022-04-15 RX ADMIN — CEFTRIAXONE 100 MILLIGRAM(S): 500 INJECTION, POWDER, FOR SOLUTION INTRAMUSCULAR; INTRAVENOUS at 12:43

## 2022-04-15 RX ADMIN — Medication 3: at 12:45

## 2022-04-15 NOTE — DISCHARGE NOTE NURSING/CASE MANAGEMENT/SOCIAL WORK - PATIENT PORTAL LINK FT
POSTPARTUM    • Long Term Goal:Experiences normal postpartum course Progressing    • Optimize infant feeding at the breast Progressing    • Appropriate maternal -  bonding Progressing You can access the FollowMyHealth Patient Portal offered by [facility] by registering at the following website: [email or web address]. By joining [employer]’s FollowMyHealth portal, you will also be able to view your health information using other applications (apps) compatible with our system.

## 2022-04-15 NOTE — PROGRESS NOTE ADULT - SUBJECTIVE AND OBJECTIVE BOX
89y Female is under our care for     REVIEW OF SYSTEMS:  [  ] Not able to elicit  General:	  Chest:	  GI:	  :  Skin:	  Musculoskeletal:	  Neuro:	    MEDS:  cefTRIAXone   IVPB 2000 milliGRAM(s) IV Intermittent every 24 hours    ALLERGIES: Allergies    No Known Allergies    Intolerances        VITALS:  Vital Signs Last 24 Hrs  T(C): 36.9 (15 Apr 2022 05:25), Max: 37.2 (14 Apr 2022 14:03)  T(F): 98.4 (15 Apr 2022 05:25), Max: 98.9 (14 Apr 2022 14:03)  HR: 88 (15 Apr 2022 05:25) (86 - 93)  BP: 161/64 (15 Apr 2022 05:25) (129/74 - 161/64)  BP(mean): --  RR: 18 (15 Apr 2022 05:25) (18 - 18)  SpO2: 94% (15 Apr 2022 05:25) (94% - 98%)      PHYSICAL EXAM:  HEENT:  Neck:  Respiratory:  Cardiovascular:  Gastrointestinal:  Extremities:  Skin:  Ortho:  Neuro:    LABS/DIAGNOSTIC TESTS:                        12.6   15.08 )-----------( 260      ( 14 Apr 2022 06:33 )             37.0     WBC Count: 15.08 K/uL (04-14 @ 06:33)  WBC Count: 14.46 K/uL (04-13 @ 07:28)  WBC Count: 11.46 K/uL (04-12 @ 06:39)  WBC Count: 16.77 K/uL (04-11 @ 06:13)    04-14    144  |  118<H>  |  66<H>  ----------------------------<  174<H>  4.4   |  19<L>  |  2.15<H>    Ca    8.8      14 Apr 2022 06:33  Phos  3.2     04-14  Mg     1.9     04-14    TPro  5.6<L>  /  Alb  1.5<L>  /  TBili  0.7  /  DBili  x   /  AST  31  /  ALT  29  /  AlkPhos  58  04-14      CULTURES:   .Blood Blood-Peripheral  04-11 @ 11:04   No growth to date.  --  --      .Blood Blood  04-09 @ 18:52   Growth in aerobic and anaerobic bottles: Streptococcus agalactiae (Group  B)  --  Streptococcus agalactiae (Group B)      Clean Catch Clean Catch (Midstream)  04-09 @ 18:40   >100,000 CFU/ml Escherichia coli  --  Escherichia coli        RADIOLOGY:  no new studies 89y Female is under our care for UTI and bacteremia.  Patient was seen laying comfortably in bed with no acute distress with son at the bedside.  Patient is s/p left cephalic extended dwell catheter this morning and remains afebrile.    REVIEW OF SYSTEMS:  [  ] Not able to elicit  General: no fevers no malaise  Chest: no cough no sob  GI: no nvd  : no urinary sxs   Skin: no rashes  Musculoskeletal: no trauma no LBP  Neuro: no ha's no dizziness     MEDS:  cefTRIAXone   IVPB 2000 milliGRAM(s) IV Intermittent every 24 hours    ALLERGIES: Allergies    No Known Allergies    Intolerances        VITALS:  Vital Signs Last 24 Hrs  T(C): 36.9 (15 Apr 2022 05:25), Max: 37.2 (14 Apr 2022 14:03)  T(F): 98.4 (15 Apr 2022 05:25), Max: 98.9 (14 Apr 2022 14:03)  HR: 88 (15 Apr 2022 05:25) (86 - 93)  BP: 161/64 (15 Apr 2022 05:25) (129/74 - 161/64)  BP(mean): --  RR: 18 (15 Apr 2022 05:25) (18 - 18)  SpO2: 94% (15 Apr 2022 05:25) (94% - 98%)      PHYSICAL EXAM:  HEENT: n/a  Neck: supple no LN's   Respiratory: lungs clear no rales  Cardiovascular: S1 S2 reg no murmurs  Gastrointestinal: +BS with soft, nondistended abdomen; nontender  Extremities: no edema, left cephalic extended dwell  Skin: no rashes  Ortho: n/a  Neuro: AAO x 2    LABS/DIAGNOSTIC TESTS:                        12.6   15.08 )-----------( 260      ( 14 Apr 2022 06:33 )             37.0     WBC Count: 15.08 K/uL (04-14 @ 06:33)  WBC Count: 14.46 K/uL (04-13 @ 07:28)  WBC Count: 11.46 K/uL (04-12 @ 06:39)  WBC Count: 16.77 K/uL (04-11 @ 06:13)    04-14    144  |  118<H>  |  66<H>  ----------------------------<  174<H>  4.4   |  19<L>  |  2.15<H>    Ca    8.8      14 Apr 2022 06:33  Phos  3.2     04-14  Mg     1.9     04-14    TPro  5.6<L>  /  Alb  1.5<L>  /  TBili  0.7  /  DBili  x   /  AST  31  /  ALT  29  /  AlkPhos  58  04-14      CULTURES:   .Blood Blood-Peripheral  04-11 @ 11:04   No growth to date.  --  --      .Blood Blood  04-09 @ 18:52   Growth in aerobic and anaerobic bottles: Streptococcus agalactiae (Group  B)  --  Streptococcus agalactiae (Group B)      Clean Catch Clean Catch (Midstream)  04-09 @ 18:40   >100,000 CFU/ml Escherichia coli  --  Escherichia coli        RADIOLOGY:  no new studies

## 2022-04-15 NOTE — PROGRESS NOTE ADULT - SUBJECTIVE AND OBJECTIVE BOX
6cm 20 Gauge Extended Dwell Catheter inserted via the  cephalic left  vein.  Good blood flow, dressing applied.  Maximum catheter dwell time is <29 days. May use immediately. Dressisng c/d/i.

## 2022-04-15 NOTE — DISCHARGE NOTE NURSING/CASE MANAGEMENT/SOCIAL WORK - NSDCPEPT PROEDHF_GEN_ALL_CORE
Call primary care provider for follow up after discharge/Activities as tolerated/Low salt diet/Monitor weight daily/Report signs and symptoms to primary care provider 23-Jun-2018

## 2022-04-15 NOTE — PROGRESS NOTE ADULT - PROVIDER SPECIALTY LIST ADULT
Endocrinology
Internal Medicine
Intervent Radiology
Endocrinology
Endocrinology
Nephrology
Infectious Disease
Infectious Disease
Internal Medicine
Nephrology
Internal Medicine

## 2022-04-15 NOTE — PROGRESS NOTE ADULT - SUBJECTIVE AND OBJECTIVE BOX
[facility] NEPHROLOGY- PROGRESS NOTE    Patient is a 89yo Female with unknown PMH, presents with weakness and fall per ED chart. Admitted for fall found to have UTI and GBS bacteremia. Nephrology consulted for Elevated serum creatinine.    Hospital Medications: Medications reviewed.  REVIEW OF SYSTEMS: Sami  # [id number]  Limited; denies any chest pain but unable to comprehend further speech.     VITALS:  T(F): 98.4 (04-15-22 @ 05:25), Max: 98.9 (22 @ 14:03)  HR: 88 (04-15-22 @ 05:25)  BP: 161/64 (04-15-22 @ 05:25)  RR: 18 (04-15-22 @ 05:25)  SpO2: 94% (04-15-22 @ 05:25)  Wt(kg): --     @ 07:01  -  04-15 @ 07:00  --------------------------------------------------------  IN: 840 mL / OUT: 1200 mL / NET: -360 mL        PHYSICAL EXAM:  Constitutional: NAD  HEENT: anicteric sclera,   Respiratory: CTA ant  Cardiovascular: S1, S2, RRR  Gastrointestinal: BS+, soft, NT/ND  : +acosta with clear urine  Extremities: No peripheral edema    LABS: No new labs      144  |  118<H>  |  66<H>  ----------------------------<  174<H>  4.4   |  19<L>  |  2.15<H>    Ca    8.8      2022 06:33  Phos  3.2       Mg     1.9         TPro  5.6<L>  /  Alb  1.5<L>  /  TBili  0.7  /  DBili      /  AST  31  /  ALT  29  /  AlkPhos  58      Creatinine Trend: 2.15 <--, 2.30 <--, 2.93 <--, 3.11 <--, 3.04 <--, 2.94 <--, 3.54 <--                        12.6   15.08 )-----------( 260      ( 2022 06:33 )             37.0     Urine Studies:  Urinalysis Basic - ( 2022 14:50 )    Color: Yellow / Appearance: Slightly Turbid / S.015 / pH:   Gluc:  / Ketone: Negative  / Bili: Negative / Urobili: Negative   Blood:  / Protein: 500 mg/dL / Nitrite: Negative   Leuk Esterase: Moderate / RBC: 5-10 /HPF / WBC >50 /HPF   Sq Epi:  / Non Sq Epi: Few /HPF / Bacteria: TNTC /HPF      Sodium, Random Urine: 68 mmol/L ( @ 02:18)  Creatinine, Random Urine: 39 mg/dL ( @ 02:18)  Chloride, Random Urine: 67 mmol/L ( @ 02:18)

## 2022-04-15 NOTE — DISCHARGE NOTE PROVIDER - PROVIDER TOKENS
PROVIDER:[TOKEN:[[id number]],FOLLOWUP:[1 week]],PROVIDER:[TOKEN:[[id number]],FOLLOWUP:[1 week]],PROVIDER:[TOKEN:[[id number]:[id number]],FOLLOWUP:[1 week]] PROVIDER:[TOKEN:[[id number]],FOLLOWUP:[1 week]],PROVIDER:[TOKEN:[[id number]],FOLLOWUP:[1 week]],PROVIDER:[TOKEN:[[id number]],FOLLOWUP:[1 week]],PROVIDER:[TOKEN:[[id number]]]

## 2022-04-15 NOTE — PROGRESS NOTE ADULT - ASSESSMENT
UTI - with E coli  Bacteremia - with group B strep- repeat cultures are negative  Leukocytosis    Plan - Continue Rocephin 2gm iv q24hrs  will give abxs for 28 days post 1st negative blood culture, as we do not know the source of her bacteremia and do not want to subject her to a ROSI at her age.  s/p extended dwell catheter placement today

## 2022-04-15 NOTE — DISCHARGE NOTE NURSING/CASE MANAGEMENT/SOCIAL WORK - NSDCPEFALRISK_GEN_ALL_CORE
For information on Fall & Injury Prevention, visit: [email or web address] OR  [email or web address] OR  https://www.cdc.gov/steadi/patient.html

## 2022-04-15 NOTE — PROGRESS NOTE ADULT - PROBLEM SELECTOR PROBLEM 2
UTI (urinary tract infection)
Bacteremia due to group B Streptococcus
UTI (urinary tract infection)
Bacteremia due to group B Streptococcus
UTI (urinary tract infection)
Bacteremia due to group B Streptococcus

## 2022-04-15 NOTE — PROGRESS NOTE ADULT - PROBLEM SELECTOR PLAN 8
Likely hypovolemic hypernatremia giving BUN also trending up  previously on NS 0.45--> switched to D5  Na 148>151>148>144  resolved  repeat BMP tomorrow

## 2022-04-15 NOTE — PROGRESS NOTE ADULT - PROBLEM SELECTOR PLAN 9
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 Hdez on board

## 2022-04-15 NOTE — DISCHARGE NOTE PROVIDER - HOSPITAL COURSE
89 year old woman Lao speaker AAOX 3 at baseline w/ HHA with PMH of DM2, HTN, CAD s/p revascularization surgery, chronic Afib who presented from home with multiple episodes of nausea, vomiting and diarrhea with associated weakness, confusion and episodes of falls, w / head trauma but no LOC.In the ED, she was noted to be febrile. On admission with elevated WBC, elevated lactate levels, UA grossly positive, admitted for UTI and encephalopathy. s/p cefepime and NS 4L in ED    UTI- UCX (4/9) E.coli  sensitive to ceftriaxone. So far has completed course of ABX for her UTI. Hospital course was complicated as she had BCX (4/9)  showing Step. Agalactiae X 2 (Sensitive) to ceftriaxone. no clear source of infection.  Repeat BCX from (4/11) showing NGTD x 2. WBC and lactate trended down.  ID was consulted, recommended pt should be on Ceftriaxone 2 gr IV daily X 28 days  total until 5/9/22 , Had mid line placed today by IR    Encephalopathy-  had negative CTH. Likely delirium in the s/o UTI. Given pt had episodes of agitation BH was consulted recommended  Zyprexa 2.5 mg PO QHS. MS has significantly improved,  resolving.    Hospital course was also complicated by CHAZ,  noted to have Cr 3.54 (unknown baseline).  CT A/P atrophic (L) Kidney. Nephro was consulted- obtained  u-elytes which showed FeNA 3.6 % (intrinsic renal disease).  It was considered pt with CHAZ likely d/t ATN in the s/o infection . SCr trended down, improved significantly w/ IVF.  She also developed Hypernatremia, likely hypovolemic hypernatremia, tx w/ D5. Na corrected.    Hospital course was also complicated by Acute respiratory failure with hypoxia, given new mild O2 requirements. CXR obtained w/ findings of fluid overload. CTC (4/14) ground glass densities on RU & R middle lobe (edema v/s infection), also small L pleural effusion, procalcitonin of 10. Also underwent  TTE (4/13) EF 40-45%, GIDD, hypokinetic luis-septum, distal anterior wall and apex; severe AS. It was considered to be  likely due to CHF exacerbation w/ also possible PNA given elevated procal,  Hypoxia resolved, saturating well on RA.      Chronic systolic heart failure as per TTE referenced above, Did not receive diuretics, was managed conservatively given advanced age and severe Aortic stenosis.     Hx of Chronic atrial fibrillation, [id number] per prior cardiology note. was continued Eliquis 2.5mg po BID, did not require BB for rate control    For  Hypertension, her high  BP was tx with amlodipine 10 mg daily. 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  She was started on Vit D 82460 IU  as per endo recs.     Urinary retention., had Deleon catheter placed on 4/11, d/petey today.  Has been on Flomax     Pt is clinically stable  to be discharged to [facility] today as per PT recs. Deleon catheter was removed today at 11: 30 AM. TOV and bladder scan after 6 hrs if she has not voided. Pt will need to have OP f/u w/  PCP , ID and cardiology in 1 wk from [place].        89 year old woman Armenian speaker AAOX 3 at baseline w/ HHA with PMH of DM2, HTN, CAD s/p revascularization surgery, chronic Afib who presented from home with multiple episodes of nausea, vomiting and diarrhea with associated weakness, confusion and episodes of falls, w / head trauma but no LOC.In the ED, she was noted to be febrile. On admission with elevated WBC, elevated lactate levels, UA grossly positive, admitted for UTI and encephalopathy. s/p cefepime and NS 4L in ED    UTI- UCX (4/9) E.coli  sensitive to ceftriaxone. So far has completed course of ABX for her UTI. Hospital course was complicated as she had BCX (4/9)  showing Step. Agalactiae X 2 (Sensitive) to ceftriaxone. no clear source of infection.  Repeat BCX from (4/11) showing NGTD x 2. WBC and lactate trended down.  ID was consulted, recommended pt should be on Ceftriaxone 2 gr IV daily X 28 days  total until 5/9/22 , Had mid line placed today by IR    Encephalopathy-  had negative CTH. Likely delirium in the s/o UTI. Given pt had episodes of agitation BH was consulted recommended  Zyprexa 2.5 mg PO QHS. MS has significantly improved,  resolving.    Hospital course was also complicated by CHAZ,  noted to have Cr 3.54 (unknown baseline).  CT A/P atrophic (L) Kidney. Nephro was consulted- obtained  u-elytes which showed FeNA 3.6 % (intrinsic renal disease).  It was considered pt with CHAZ likely d/t ATN in the s/o infection . SCr trended down, improved significantly w/ IVF.  She also developed Hypernatremia, likely hypovolemic hypernatremia, tx w/ D5. Na corrected.    Hospital course was also complicated by Acute respiratory failure with hypoxia, given new mild O2 requirements. CXR obtained w/ findings of fluid overload. CTC (4/14) ground glass densities on RU & R middle lobe (edema v/s infection), also small L pleural effusion, procalcitonin of 10. Also underwent  TTE (4/13) EF 40-45%, GIDD, hypokinetic luis-septum, distal anterior wall and apex; severe AS. It was considered to be  likely due to CHF exacerbation w/ also possible PNA given elevated procal,  Hypoxia resolved, saturating well on RA.      Chronic systolic heart failure as per TTE referenced above, Did not receive diuretics, was managed conservatively given advanced age and severe Aortic stenosis.     Hx of Chronic atrial fibrillation, [id number] per prior cardiology note. was continued Eliquis 2.5mg po BID, did not require BB for rate control    For  Hypertension, her high  BP was tx with amlodipine 10 mg daily. 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  She was started on Vit D 25848 IU  as per endo recs.     Urinary retention., had Deleon catheter placed on 4/11, d/petey today.  Has been on Flomax.    Pt is clinically stable  to be discharged to [facility] today as per PT recs. Deleon catheter was removed today at 11: 30 AM. TOV and bladder scan after 6 hrs if she has not voided. Pt will need to have OP f/u w/  PCP , ID and cardiology in 1 wk from [place].        89 year old woman Belgian speaker AAOX 3 at baseline w/ HHA with PMH of DM2, HTN, CAD s/p revascularization surgery, chronic Afib who presented from home with multiple episodes of nausea, vomiting and diarrhea with associated weakness, confusion and episodes of falls, w / head trauma but no LOC.In the ED, she was noted to be febrile. On admission with elevated WBC, elevated lactate levels, UA grossly positive, admitted for UTI and encephalopathy. s/p cefepime and NS 4L in ED    UTI- UCX (4/9) E.coli  sensitive to ceftriaxone. So far has completed course of ABX for her UTI. Hospital course was complicated as she had BCX (4/9)  showing Step. Agalactiae X 2 (Sensitive) to ceftriaxone. no clear source of infection.  Repeat BCX from (4/11) showing NGTD x 2. WBC and lactate trended down.  ID was consulted, recommended pt should be on Ceftriaxone 2 gr IV daily X 28 days  total until 5/9/22 , Had mid line placed today by IR    Encephalopathy-  had negative CTH. Likely delirium in the s/o UTI. Given pt had episodes of agitation BH was consulted recommended  Zyprexa 2.5 mg PO QHS. MS has significantly improved,  resolving.    Hospital course was also complicated by HCAZ,  noted to have Cr 3.54 (unknown baseline).  CT A/P atrophic (L) Kidney. Nephro was consulted- obtained  u-elytes which showed FeNA 3.6 % (intrinsic renal disease).  It was considered pt with CHAZ likely d/t ATN in the s/o infection . SCr trended down, improved significantly w/ IVF.  She also developed Hypernatremia, likely hypovolemic hypernatremia, tx w/ D5. Na corrected.    Hospital course was also complicated by Acute respiratory failure with hypoxia, given new mild O2 requirements. CXR obtained w/ findings of fluid overload. CTC (4/14) ground glass densities on RU & R middle lobe (edema v/s infection), also small L pleural effusion, procalcitonin of 10. Also underwent  TTE (4/13) EF 40-45%, GIDD, hypokinetic luis-septum, distal anterior wall and apex; severe AS. It was considered to be  likely due to CHF exacerbation w/ also possible PNA given elevated procal,  Hypoxia resolved, saturating well on RA.      Chronic systolic heart failure as per TTE referenced above, Did not receive diuretics, was managed conservatively given advanced age and severe Aortic stenosis.     Hx of Chronic atrial fibrillation, [id number] per prior cardiology note. was continued Eliquis 2.5mg po BID, did not require BB for rate control    For  Hypertension, her high  BP was tx with amlodipine 10 mg daily. 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  She was started on Vit D 02403 IU  as per endo recs.     Urinary retention- Deleon catheter placed on 4/11, d/petey today for TOV which she has failed.  Pt will be DC on Deleon catheter reinserted today, Has been on Flomax.    Pt is clinically stable  to be discharged to [facility] today as per PT recs with Deleon catheter. She will need to have TOV at rehabilitation facility ~ day 5th of her stay. Pt will be on Ceftriaxone 2 gr IV daily X 28 days  total until 5/9/22  Pt will need to have OP f/u w/  PCP , ID and cardiology in 1 wk from [place].

## 2022-04-15 NOTE — PROGRESS NOTE ADULT - ASSESSMENT
Patient is a 89yo Female with unknown PMH, presents with weakness and fall per ED chart. Admitted for fall found to have UTI and GBS bacteremia. Nephrology consulted for Elevated serum creatinine.    1. CHAZ- unknown baseline SCr; likely hemodynamically mediated in the setting of sepsis/ infection and less likely due to obstruction. Active UA in the setting of UTI. FeNa 3.59%; intrinsic likely ATN. +bladder scan and now with acosta; with no improvement in renal function after acosta placement which is more consistent with ATN. Hypernatremia- resolved; monitor off IVF. Pt with +severe AS on TTE with elevated BNP; pt euvolemic on exam. CT abd/pelvis with no hydro but distended bladder with atrophic left kidney.  Strict I/Os. Avoid nephrotoxins/ NSAIDs/ RCA. Monitor BMP.  2. CKD unspecified- previous SCr 1.6-1.9 in 2018; however no recent blood work for review. CT abd/pelvis with left atrophic kidney. Defer secondary w/u due to advanced age. Avoid nephrotoxins  As per pt's son (at bedside); pt was born with atrophic left kidney and use to see Nephrologist Dr. Mercer for years but no longer accepts her insurance and has not seen in 2 yrs. Pt subsequently saw a new PMD in November since returning [facility] [place]; he will try to get us the PMD's office number. PTH elevated; with elevated corrected calcium; check ionized Ca- will consider starting Calcitriol once Ca declines. SIFE neg with normal SPEOP  3. Sepsis- due to Strep agalactia bactremia and Ecoli UTI with ?aspiration PNA. Pt on Ceftriaxone s/p Flagyl. ID following  4. HTN 2/2 CKD- BP elevated this am. Repeat BP; if elevated - Recc to d/c Amlodipine and switch to Nifedipine ER 60mg PO daily, titrate as needed. c/w low salt diet. Monitor BP  5. Acute encephalopathy- CT head neg. Plan as per primary team.       [facility] NEPHROLOGY  Gaurav Helms M.D.  Dylan Pimentel D.O.  Sindy Baugh M.D.  Citlaly Bridges, BETH, ANP-C  [phone]    [address] [place] [id number]

## 2022-04-15 NOTE — PROGRESS NOTE ADULT - PROBLEM SELECTOR PLAN 1
Patient with grossly positive UA  s/p cefepime and NS 4L in ED  UCX (4/9) E.coli  sensitive to ceftriaxone  c/w ceftriaxone  ID, Dr. Arnold, consulted

## 2022-04-15 NOTE — PROGRESS NOTE ADULT - PROBLEM SELECTOR PLAN 7
TTE (4/13) EF 40-45%, GIDD, hypokinetic luis-septum, distal anterior wall and apex; severe AS  cardio consulted Dr. Pacheco

## 2022-04-15 NOTE — PROGRESS NOTE ADULT - PROBLEM SELECTOR PLAN 11
Patient with chronic atrial fibrillation per chart review with [id number] per prior cardiology note. Was admitted to [facility] in 2018 for acute CVA  Continue Eliquis 2.5mg po BID  Does not appear to currently be on a BB, so far not requiring rate controlled medication

## 2022-04-15 NOTE — DISCHARGE NOTE PROVIDER - NSDCCAREPROVSEEN_GEN_ALL_CORE_FT
William, Whit Howe, Colette Mcdermott, Anneliese Kohler, Kiara Hdez, Katelynn Cochran, Sindy Rodríguez

## 2022-04-15 NOTE — PROGRESS NOTE ADULT - SUBJECTIVE AND OBJECTIVE BOX
PGY-1 Progress Note discussed with attending    PAGER #: [[phone]] TILL 5:00 PM  PLEASE CONTACT ON CALL TEAM:  - On Call Team (Please refer to Migue) FROM 5:00 PM - 8:30PM  - Nightfloat Team FROM 8:30 -7:30 AM        INTERVAL HPI/OVERNIGHT EVENTS:       REVIEW OF SYSTEMS:  CONSTITUTIONAL: No fever, weight loss, or fatigue  RESPIRATORY: No cough, wheezing, chills or hemoptysis; No shortness of breath  CARDIOVASCULAR: No chest pain, palpitations, dizziness, or leg swelling  GASTROINTESTINAL: No abdominal pain. No nausea, vomiting, or hematemesis; No diarrhea or constipation. No melena or hematochezia.  GENITOURINARY: No dysuria or hematuria, urinary frequency  NEUROLOGICAL: No headaches, memory loss, loss of strength, numbness, or tremors  SKIN: No itching, burning, rashes, or lesions     Vital Signs Last 24 Hrs  T(C): 36.9 (15 Apr 2022 05:25), Max: 37.2 (14 Apr 2022 14:03)  T(F): 98.4 (15 Apr 2022 05:25), Max: 98.9 (14 Apr 2022 14:03)  HR: 88 (15 Apr 2022 05:25) (86 - 93)  BP: 161/64 (15 Apr 2022 05:25) (129/74 - 161/64)  BP(mean): --  RR: 18 (15 Apr 2022 05:25) (18 - 18)  SpO2: 94% (15 Apr 2022 05:25) (94% - 98%)    PHYSICAL EXAMINATION:  GENERAL: NAD, well built  HEAD:  Atraumatic, Normocephalic  EYES:  conjunctiva and sclera clear  NECK: Supple, No JVD, Normal thyroid  CHEST/LUNG: Clear to auscultation. Clear to percussion bilaterally; No rales, rhonchi, wheezing, or rubs  HEART: Regular rate and rhythm; No murmurs, rubs, or gallops  ABDOMEN: Soft, Nontender, Nondistended; Bowel sounds present  NERVOUS SYSTEM:  Alert & Oriented X3,    EXTREMITIES:  2+ Peripheral Pulses, No clubbing, cyanosis, or edema  SKIN: warm dry                          12.6   15.08 )-----------( 260      ( 14 Apr 2022 06:33 )             37.0     04-14    144  |  118<H>  |  66<H>  ----------------------------<  174<H>  4.4   |  19<L>  |  2.15<H>    Ca    8.8      14 Apr 2022 06:33  Phos  3.2     04-14  Mg     1.9     04-14    TPro  5.6<L>  /  Alb  1.5<L>  /  TBili  0.7  /  DBili  x   /  AST  31  /  ALT  29  /  AlkPhos  58  04-14    LIVER FUNCTIONS - ( 14 Apr 2022 06:33 )  Alb: 1.5 g/dL / Pro: 5.6 g/dL / ALK PHOS: 58 U/L / ALT: 29 U/L DA / AST: 31 U/L / GGT: x                   CAPILLARY BLOOD GLUCOSE      RADIOLOGY & ADDITIONAL TESTS:                   PGY-1 Progress Note discussed with attending    PAGER #: [[phone]] TILL 5:00 PM  PLEASE CONTACT ON CALL TEAM:  - On Call Team (Please refer to Migue) FROM 5:00 PM - 8:30PM  - Nightfloat Team FROM 8:30 -7:30 AM        INTERVAL HPI/OVERNIGHT EVENTS:  no acute overnight events. Pt seen and examined at bedside, alert and interacting with me in English, endorses feeling ok.       REVIEW OF SYSTEMS:  CONSTITUTIONAL: No fever, weight loss, or fatigue  RESPIRATORY: No cough, wheezing, chills or hemoptysis; No shortness of breath  CARDIOVASCULAR: No chest pain, palpitations, dizziness, or leg swelling  GASTROINTESTINAL: No abdominal pain. No nausea, vomiting, or hematemesis; No diarrhea or constipation. No melena or hematochezia.  GENITOURINARY: No dysuria or hematuria, urinary frequency  NEUROLOGICAL: No headaches, memory loss, loss of strength, numbness, or tremors  SKIN: No itching, burning, rashes, or lesions     Vital Signs Last 24 Hrs  T(C): 36.9 (15 Apr 2022 05:25), Max: 37.2 (14 Apr 2022 14:03)  T(F): 98.4 (15 Apr 2022 05:25), Max: 98.9 (14 Apr 2022 14:03)  HR: 88 (15 Apr 2022 05:25) (86 - 93)  BP: 161/64 (15 Apr 2022 05:25) (129/74 - 161/64)  BP(mean): --  RR: 18 (15 Apr 2022 05:25) (18 - 18)  SpO2: 94% (15 Apr 2022 05:25) (94% - 98%)      PHYSICAL EXAMINATION:  GENERAL: NAD, well built  HEAD:  Atraumatic, Normocephalic  EYES:  conjunctiva and sclera clear  NECK: Supple, No JVD  CHEST/LUNG: Clear to auscultation.; no crackles, no rhonchi, wheezing, or rubs  HEART: Regular rate and rhythm; No murmurs, rubs, or gallops  ABDOMEN: Soft, Nontender, Nondistended; Bowel sounds present  : Deleon catheter draining clear urine  NERVOUS SYSTEM:  Alert & Oriented X2   EXTREMITIES:  2+ Peripheral Pulses, No clubbing, cyanosis, or edema  SKIN: warm dry                            12.6   15.08 )-----------( 260      ( 14 Apr 2022 06:33 )             37.0     04-14    144  |  118<H>  |  66<H>  ----------------------------<  174<H>  4.4   |  19<L>  |  2.15<H>    Ca    8.8      14 Apr 2022 06:33  Phos  3.2     04-14  Mg     1.9     04-14    TPro  5.6<L>  /  Alb  1.5<L>  /  TBili  0.7  /  DBili  x   /  AST  31  /  ALT  29  /  AlkPhos  58  04-14    LIVER FUNCTIONS - ( 14 Apr 2022 06:33 )  Alb: 1.5 g/dL / Pro: 5.6 g/dL / ALK PHOS: 58 U/L / ALT: 29 U/L DA / AST: 31 U/L / GGT: x                   CAPILLARY BLOOD GLUCOSE      RADIOLOGY & ADDITIONAL TESTS:

## 2022-04-15 NOTE — PROGRESS NOTE ADULT - PROBLEM SELECTOR PLAN 3
Pt AAOx 3 at home  able to do some ADL on her own  CTH negative   likely delirium in the s/o UTI with some component of dementia  previously had explained to pt's son that given her advanced age it may take some time for her to go back to her baseline MS  As per Dr. Thomas recs- Zyprexa 2.5 mg PO QHS; for breakthrough agitation Zyprexa 2.5 mg IM Q 12 hrs PRN only if needed  MS improved today  resolving

## 2022-04-15 NOTE — DISCHARGE NOTE PROVIDER - ATTENDING DISCHARGE PHYSICAL EXAMINATION:
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   #Chronic atrial fibrillation  #Urinary retention s/p acosta 4/12  #HFreF TTE with EF 48%  #Severe aortic stenosis on TTE  - repeat blood cx NGTD  - extended dwell placed today, plan for 28 days of IV ceftriaxone 2gm q24 hour at rehab   - ID following  - CT with some pleural effusion and volume overload  - TOV today, if patient retaining will dc with acosta and urology f/u in one week   - nephrology following for CHAZ workup   - continue eliquis  - hyperparathyroidism 2/2 vit d deficiency on ergocalceferl for 8 weeks, endo following  - due to patient's severe aortic stenosis, will hold off lasix and BB for heart failure. Currently euvolemic on room air. F/u with cardiology outpatient

## 2022-04-15 NOTE — PROGRESS NOTE ADULT - PROBLEM SELECTOR PLAN 2
-BCX (4/9) Step. Agalactiae X 2  -Source unclear. ?Urine? Patient with known colo-vesicular fistula per discussion with sonJose  -May be contaminant as per ID  -Lactate 2.5->1.5  -WBC slightly uptrending  -repeat BCX (4/11) NGTD X 2  -c/w ceftriaxone D6  -As per ID pt will need to be on Ceftriaxone 2 gr IV daily X 28 days until 5/9/22 as per ID  -s/p mid line placed today by IR

## 2022-04-15 NOTE — DISCHARGE NOTE PROVIDER - NSDCMRMEDTOKEN_GEN_ALL_CORE_FT
apixaban 2.5 mg oral tablet: 1 tab(s) orally every 12 hours  cefTRIAXone 2 g injection: 2 gram(s) intravenously once a day   citalopram 20 mg oral tablet: 1 tab(s) orally once a day  estradiol: 1 gram(s) vaginal 3 times a week  losartan 100 mg oral tablet: 1 tab(s) orally once a day   amLODIPine 10 mg oral tablet: 1 tab(s) orally once a day  apixaban 2.5 mg oral tablet: 1 tab(s) orally every 12 hours  cefTRIAXone 2 g injection: 2 gram(s) intravenously once a day   citalopram 20 mg oral tablet: 1 tab(s) orally once a day  ergocalciferol: 88636 unit(s) orally once a week   estradiol: 1 gram(s) vaginal 3 times a week  OLANZapine 2.5 mg oral tablet: 1 tab(s) orally once a day (at bedtime)  tamsulosin 0.4 mg oral capsule: 1 cap(s) orally once a day (at bedtime)

## 2022-04-15 NOTE — PROGRESS NOTE ADULT - PROBLEM SELECTOR PLAN 10
Pt with urinary retention  Bladder scan showed ~600 cc urine  (4/11)-straight cath ~3PM today, obtaining 1000 cc of urine  Deleon catheter placed on 4/11  c/w Deleon  c/w Flomax   potentially attempt TOV  tomorrow

## 2022-04-15 NOTE — DISCHARGE NOTE PROVIDER - CARE PROVIDER_API CALL
David Pacheco)  Cardiology  [address] [place] [address]  Phone: [phone]  Fax: [phone]  Follow Up Time: 1 week    Shakeel Arnold)  Infectious Disease  [address] [place] [address]  Phone: [phone]  Fax: [phone]  Follow Up Time: 1 week    JT MAYO  Internal Medicine  [address] [place] [address]  Phone: ()-  Fax: ()-  Follow Up Time: 1 week   David Pacheco)  Cardiology  [address] [place] [address]  Phone: [phone]  Fax: [phone]  Follow Up Time: 1 week    Shakeel Arnold)  Infectious Disease  [address] [place] [address]  Phone: [phone]  Fax: [phone]  Follow Up Time: 1 week    JT MAYO  Internal Medicine  [address] [place] [address]  Phone: ()-  Fax: ()-  Follow Up Time: 1 week    Katelynn Hdez)  EndocrinologyMetabDiabetes  [address] [place] [address]  Phone: [phone]  Fax: [phone]  Follow Up Time:

## 2022-04-15 NOTE — DISCHARGE NOTE PROVIDER - NSDCCPCAREPLAN_GEN_ALL_CORE_FT
PRINCIPAL DISCHARGE DIAGNOSIS  Diagnosis: Bacteremia due to group B Streptococcus  Assessment and Plan of Treatment: results of your blood cultures showed group B streptococcus. For this you were treated with IV antibiotic. There is no clear source of the infection, repeat blood cultures have showed no bacterial growth.  Infectious disease specialist was consulted, recommended for you to be on IV antibiotic ceftriaxone 2 gr IV daily until 5/9/22. For this reason you had IV access placed today. Once you finish your antibiotic you will need to have this IV access removed. You will need to follow up with infectious disease speciaslit in 1 wk from follow up         SECONDARY DISCHARGE DIAGNOSES  Diagnosis: Acute UTI  Assessment and Plan of Treatment: You were found to have a urinary tract infection on your admission.    You were treated with ceftriaxone, you have completed 6  days of iv antibiotics in the hospital with the goal of clearing this infection. Urinary tract infections can cause weakness, confusion, or spread to other organ systems in the body. If you experience continued symptoms of infection (fever, chills, weakness, or burning with urination), please follow up with your primary care provider.    Diagnosis: Delirium  Assessment and Plan of Treatment: You had altered mental status due to the urinary infection and the bacteria in your blood stream. However, as the infection cleared up from your blood stream, your mental status improved significantly.  Please follow up with Dr. Richardson in 1 wk from discharge to inform him about our recent hospitalization    Diagnosis: CHAZ (acute kidney injury)  Assessment and Plan of Treatment: You were found to have elevation of your serum creatinine on admission. For this reason kidney specialist was consulted, your renal function improved with IV hydration. It was considered that the deterioration of your renal function was due to the active infection.  It is highly recommended that you follow up with Dr. Baugh (kidney specialist) in 1 wk from discharge.    Diagnosis: Hypernatremia  Assessment and Plan of Treatment: Your sodium levels were elevated due to poor oral intake, You were treated with IV fluids, your sodium levels improved and have normalized. Please follow up with your PCP in 1 wk from discharge to monitor your electrolyte levels.    Diagnosis: Atrial fibrillation  Assessment and Plan of Treatment: You have hx of atrial fibrillation, which is a type of abnormal rhythm to the heart in which the upper chambers of your heart beat out of coordination with respect to the lower chambers. For this you were continued on your home dose of  Eliquis.  You did not require medication for rate control. Upon discharge please follow up with cardiologist in 1 wk from discharge date.    Diagnosis: Acute hypoxemic respiratory failure  Assessment and Plan of Treatment: During your hospital stay your oxygen levels dropped slightly requiring you to be on supplemental oxygen via nasal canula. It was considered that this could have been caused by decompensated heart failure and also possible pneumonia.    Diagnosis: Chronic systolic congestive heart failure  Assessment and Plan of Treatment:     Diagnosis: HTN (hypertension)  Assessment and Plan of Treatment:     Diagnosis: Urinary retention  Assessment and Plan of Treatment:      PRINCIPAL DISCHARGE DIAGNOSIS  Diagnosis: Bacteremia due to group B Streptococcus  Assessment and Plan of Treatment: results of your blood cultures showed group B streptococcus. For this you were treated with IV antibiotic. There is no clear source of the infection, repeat blood cultures have showed no bacterial growth.  Infectious disease specialist was consulted, recommended for you to be on IV antibiotic ceftriaxone 2 gr IV daily until 5/9/22. For this reason you had IV access placed today. Once you finish your antibiotic you will need to have this IV access removed. You will need to follow up with infectious disease speciaslit in 1 wk from follow up         SECONDARY DISCHARGE DIAGNOSES  Diagnosis: Acute UTI  Assessment and Plan of Treatment: You were found to have a urinary tract infection on your admission.    You were treated with ceftriaxone, you have completed 6  days of iv antibiotics in the hospital with the goal of clearing this infection. Urinary tract infections can cause weakness, confusion, or spread to other organ systems in the body. If you experience continued symptoms of infection (fever, chills, weakness, or burning with urination), please follow up with your primary care provider.    Diagnosis: Delirium  Assessment and Plan of Treatment: You had altered mental status due to the urinary infection and the bacteria in your blood stream. However, as the infection cleared up from your blood stream, your mental status improved significantly.  Please follow up with Dr. Richardson in 1 wk from discharge to inform him about our recent hospitalization    Diagnosis: CHAZ (acute kidney injury)  Assessment and Plan of Treatment: You were found to have elevation of your serum creatinine on admission. For this reason kidney specialist was consulted, your renal function improved with IV hydration. It was considered that the deterioration of your renal function was due to the active infection.  It is highly recommended that you follow up with Dr. Baugh (kidney specialist) in 1 wk from discharge.    Diagnosis: Hypernatremia  Assessment and Plan of Treatment: Your sodium levels were elevated due to poor oral intake, You were treated with IV fluids, your sodium levels improved and have normalized. Please follow up with your PCP in 1 wk from discharge to monitor your electrolyte levels.    Diagnosis: Atrial fibrillation  Assessment and Plan of Treatment: You have hx of atrial fibrillation, which is a type of abnormal rhythm to the heart in which the upper chambers of your heart beat out of coordination with respect to the lower chambers. For this you were continued on your home dose of  Eliquis.  You did not require medication for rate control. Upon discharge please follow up with cardiologist in 1 wk from discharge date.    Diagnosis: Acute hypoxemic respiratory failure  Assessment and Plan of Treatment: During your hospital stay your oxygen levels dropped slightly requiring you to be on supplemental oxygen via nasal canula. It was considered that this could have been caused by decompensated heart failure and also possible pneumonia. becuse you received antibiotic for the urinary tract infection and for the bacteria in the blood, that antibiotic also covers for organisms causing infeciton.  Your oxygen levels improved and no longer are requiring supplemental oxygen.    Diagnosis: Chronic systolic congestive heart failure  Assessment and Plan of Treatment: Imaging of your heart showed that the pumping function of your heart is decreased which is consistent with heart failure. Please comply to a low salt diet and restrict fluids to 1.5 L daily. Also please follow up with cardiology in 1 wk from discharge date.    Diagnosis: HTN (hypertension)  Assessment and Plan of Treatment: You have high blood pressure. Your blood pressure was managed with amlodipine 10 mg oral daily. Losartan was held because of your decreased renal funciton.  Please continue to HOLD LOSARTAN and continue  taking  Amlodipine 10 mg oral daily as prescribed. Please measure your blood pressure at least once daily. Maintain a healthy diet which includes incorporating more vegetables and decreasing the amount of salt (low sodium) and sugar in your diet such as rice, bread, and pasta low sugar, low fat, low sodium diet. Exercise frequently if possible. Please follow up with your primary care provider within one week of your discharge.    Diagnosis: Urinary retention  Assessment and Plan of Treatment: You had urinary retention during your hospital stay. You had catheter placed and were started on a medication called Flomax.  Urinary catheter was removed today. Please monitor for signs of urinary retention and follow up with your PCP in 1 wk from your discharge to determine whether you still need to be on flomax.     PRINCIPAL DISCHARGE DIAGNOSIS  Diagnosis: Bacteremia due to group B Streptococcus  Assessment and Plan of Treatment: results of your blood cultures showed group B streptococcus. For this you were treated with IV antibiotic. There is no clear source of the infection, repeat blood cultures have showed no bacterial growth.  Infectious disease specialist was consulted, recommended for you to be on IV antibiotic ceftriaxone 2 gr IV daily until 5/9/22. For this reason you had IV access placed today. Once you finish your antibiotic you will need to have this IV access removed. You will need to follow up with infectious disease speciaslit in 1 wk from follow up         SECONDARY DISCHARGE DIAGNOSES  Diagnosis: Acute UTI  Assessment and Plan of Treatment: You were found to have a urinary tract infection on your admission.    You were treated with ceftriaxone, you have completed 6  days of iv antibiotics in the hospital with the goal of clearing this infection. Urinary tract infections can cause weakness, confusion, or spread to other organ systems in the body. If you experience continued symptoms of infection (fever, chills, weakness, or burning with urination), please follow up with your primary care provider.    Diagnosis: CHAZ (acute kidney injury)  Assessment and Plan of Treatment: You were found to have elevation of your serum creatinine on admission. For this reason kidney specialist was consulted, your renal function improved with IV hydration. It was considered that the deterioration of your renal function was due to the active infection.  It is highly recommended that you follow up with Dr. Baugh (kidney specialist) in 1 wk from discharge.    Diagnosis: Delirium  Assessment and Plan of Treatment: You had altered mental status due to the urinary infection and the bacteria in your blood stream. However, as the infection cleared up from your blood stream, your mental status improved significantly.  Please follow up with Dr. Richardson in 1 wk from discharge to inform him about our recent hospitalization    Diagnosis: Hypernatremia  Assessment and Plan of Treatment: Your sodium levels were elevated due to poor oral intake, You were treated with IV fluids, your sodium levels improved and have normalized. Please follow up with your PCP in 1 wk from discharge to monitor your electrolyte levels.    Diagnosis: Acute hypoxemic respiratory failure  Assessment and Plan of Treatment: During your hospital stay your oxygen levels dropped slightly requiring you to be on supplemental oxygen via nasal canula. It was considered that this could have been caused by decompensated heart failure and also possible pneumonia. becuse you received antibiotic for the urinary tract infection and for the bacteria in the blood, that antibiotic also covers for organisms causing infecton.  Your oxygen levels improved and no longer are requiring supplemental oxygen.    Diagnosis: Chronic systolic congestive heart failure  Assessment and Plan of Treatment: Imaging of your heart showed that the pumping function of your heart is decreased which is consistent with heart failure. Please comply to a low salt diet and restrict fluids to 1.5 L daily. Also please follow up with cardiology in 1 wk from discharge date.    Diagnosis: HTN (hypertension)  Assessment and Plan of Treatment: You have high blood pressure. Your blood pressure was managed with amlodipine 10 mg oral daily. Losartan was held because of your decreased renal funciton.  Please continue to HOLD LOSARTAN and continue  taking  Amlodipine 10 mg oral daily as prescribed. Please measure your blood pressure at least once daily. Maintain a healthy diet which includes incorporating more vegetables and decreasing the amount of salt (low sodium) and sugar in your diet such as rice, bread, and pasta low sugar, low fat, low sodium diet. Exercise frequently if possible. Please follow up with your primary care provider within one week of your discharge.    Diagnosis: Atrial fibrillation  Assessment and Plan of Treatment: You have hx of atrial fibrillation, which is a type of abnormal rhythm to the heart in which the upper chambers of your heart beat out of coordination with respect to the lower chambers. For this you were continued on your home dose of  Eliquis.  You did not require medication for rate control. Upon discharge please follow up with cardiologist in 1 wk from discharge date.    Diagnosis: Urinary retention  Assessment and Plan of Treatment: You had urinary retention during your hospital stay. You had catheter placed and were started on a medication called Flomax.  Urinary catheter was removed today. Please monitor for signs of urinary retention and follow up with your PCP in 1 wk from your discharge to determine whether you still need to be on flomax.     PRINCIPAL DISCHARGE DIAGNOSIS  Diagnosis: Bacteremia due to group B Streptococcus  Assessment and Plan of Treatment: results of your blood cultures showed group B streptococcus. For this you were treated with IV antibiotic. There is no clear source of the infection, repeat blood cultures have showed no bacterial growth.  Infectious disease specialist was consulted, recommended for you to be on IV antibiotic ceftriaxone 2 gr IV daily until 5/9/22. For this reason you had IV access placed today. Once you finish your antibiotic you will need to have this IV access removed. You will need to follow up with infectious disease speciaslit in 1 wk from follow up         SECONDARY DISCHARGE DIAGNOSES  Diagnosis: Acute UTI  Assessment and Plan of Treatment: You were found to have a urinary tract infection on your admission.    You were treated with ceftriaxone, you have completed 6  days of iv antibiotics in the hospital with the goal of clearing this infection. Urinary tract infections can cause weakness, confusion, or spread to other organ systems in the body. If you experience continued symptoms of infection (fever, chills, weakness, or burning with urination), please follow up with your primary care provider.    Diagnosis: Delirium  Assessment and Plan of Treatment: You had altered mental status due to the urinary infection and the bacteria in your blood stream. However, as the infection cleared up from your blood stream, your mental status improved significantly.  Please follow up with Dr. Richardson in 1 wk from discharge to inform him about our recent hospitalization    Diagnosis: CHAZ (acute kidney injury)  Assessment and Plan of Treatment: You were found to have elevation of your serum creatinine on admission. For this reason kidney specialist was consulted, your renal function improved with IV hydration. It was considered that the deterioration of your renal function was due to the active infection.  It is highly recommended that you follow up with Dr. Baugh (kidney specialist) in 1 wk from discharge.    Diagnosis: Hypernatremia  Assessment and Plan of Treatment: Your sodium levels were elevated due to poor oral intake, You were treated with IV fluids, your sodium levels improved and have normalized. Please follow up with your PCP in 1 wk from discharge to monitor your electrolyte levels.    Diagnosis: Atrial fibrillation  Assessment and Plan of Treatment: You have hx of atrial fibrillation, which is a type of abnormal rhythm to the heart in which the upper chambers of your heart beat out of coordination with respect to the lower chambers. For this you were continued on your home dose of  Eliquis.  Upon discharge please continue to take your home medication of Eliquis as directed and  follow up with cardiologist Dr. Pacheco  in 1 wk from discharge date.    Diagnosis: Acute hypoxemic respiratory failure  Assessment and Plan of Treatment: During your hospital stay your oxygen levels dropped slightly requiring you to be on supplemental oxygen via nasal canula. It was considered that this could have been caused by decompensated heart failure and also possible pneumonia. becuse you received antibiotic for the urinary tract infection and for the bacteria in the blood, that antibiotic also covers for organisms causing infecton.  Your oxygen levels improved and no longer are requiring supplemental oxygen.    Diagnosis: Chronic systolic congestive heart failure  Assessment and Plan of Treatment: Imaging of your heart showed that the pumping function of your heart is decreased which is consistent with heart failure. Please comply to a low salt diet and restrict fluids to 1.5 L daily. Also please follow up with cardiology in 1 wk from discharge date.    Diagnosis: Severe aortic stenosis  Assessment and Plan of Treatment: Imaging of your heart showed that your have what is called aortic stenosis which means that you have narrowing on one of the valves of your heart that connect one of the chambers of your heart to the aorta. Please avoid dehydration and follow up with Dr. Pacheco as an outpatient in 1 wk from follow up.    Diagnosis: HTN (hypertension)  Assessment and Plan of Treatment: You have high blood pressure. Your blood pressure was managed with amlodipine 10 mg oral daily. Losartan was held because of your decreased renal funciton.  Please continue to HOLD LOSARTAN and continue  taking  Amlodipine 10 mg oral daily as prescribed. Please measure your blood pressure at least once daily. Maintain a healthy diet which includes incorporating more vegetables and decreasing the amount of salt (low sodium) and sugar in your diet such as rice, bread, and pasta low sugar, low fat, low sodium diet. . Please follow up with your primary care provider within one week of your discharge.    Diagnosis: Urinary retention  Assessment and Plan of Treatment: You had urinary retention during your hospital stay. You had catheter placed and were started on a medication called Flomax.  Urinary catheter was removed today. Please monitor for signs of urinary retention and follow up with your PCP in 1 wk from your discharge to determine whether you still need to be on flomax.    Diagnosis: Vitamin D deficiency  Assessment and Plan of Treatment: You were found to have vitamin D deficiency, you will need to be on Vitamin D supplement 45820 IU oral once a week for 8-10 wks as per endocrinology recommendations. Please follow up with your PCP on a regular basis to monitor your vitamin D levels     PRINCIPAL DISCHARGE DIAGNOSIS  Diagnosis: Bacteremia due to group B Streptococcus  Assessment and Plan of Treatment: results of your blood cultures showed group B streptococcus. For this you were treated with IV antibiotic. There is no clear source of the infection, repeat blood cultures have showed no bacterial growth.  Infectious disease specialist was consulted, recommended for you to be on IV antibiotic ceftriaxone 2 gr IV daily until 5/9/22. For this reason you had IV access placed today. Once you finish your antibiotic you will need to have this IV access removed. You will need to follow up with infectious disease speciaslit in 1 wk from follow up         SECONDARY DISCHARGE DIAGNOSES  Diagnosis: Acute UTI  Assessment and Plan of Treatment: You were found to have a urinary tract infection on your admission.    You were treated with ceftriaxone, you have completed 6  days of iv antibiotics in the hospital with the goal of clearing this infection. Urinary tract infections can cause weakness, confusion, or spread to other organ systems in the body. If you experience continued symptoms of infection (fever, chills, weakness, or burning with urination), please follow up with your primary care provider.    Diagnosis: Delirium  Assessment and Plan of Treatment: You had altered mental status due to the urinary infection and the bacteria in your blood stream. However, as the infection cleared up from your blood stream, your mental status improved significantly.  Please follow up with Dr. Richardson in 1 wk from discharge to inform him about our recent hospitalization    Diagnosis: CHAZ (acute kidney injury)  Assessment and Plan of Treatment: You were found to have elevation of your serum creatinine on admission. For this reason kidney specialist was consulted, your renal function improved with IV hydration. It was considered that the deterioration of your renal function was due to the active infection.  It is highly recommended that you follow up with Dr. Baugh (kidney specialist) in 1 wk from discharge.    Diagnosis: Hypernatremia  Assessment and Plan of Treatment: Your sodium levels were elevated due to poor oral intake, You were treated with IV fluids, your sodium levels improved and have normalized. Please follow up with your PCP in 1 wk from discharge to monitor your electrolyte levels.    Diagnosis: Atrial fibrillation  Assessment and Plan of Treatment: You have hx of atrial fibrillation, which is a type of abnormal rhythm to the heart in which the upper chambers of your heart beat out of coordination with respect to the lower chambers. For this you were continued on your home dose of  Eliquis.  Upon discharge please continue to take your home medication of Eliquis as directed and  follow up with cardiologist Dr. Pacheco  in 1 wk from discharge date.    Diagnosis: Acute hypoxemic respiratory failure  Assessment and Plan of Treatment: During your hospital stay your oxygen levels dropped slightly requiring you to be on supplemental oxygen via nasal canula. It was considered that this could have been caused by decompensated heart failure and also possible pneumonia. becuse you received antibiotic for the urinary tract infection and for the bacteria in the blood, that antibiotic also covers for organisms causing infecton.  Your oxygen levels improved and no longer are requiring supplemental oxygen.    Diagnosis: Chronic systolic congestive heart failure  Assessment and Plan of Treatment: Imaging of your heart showed that the pumping function of your heart is decreased which is consistent with heart failure. Please comply to a low salt diet and restrict fluids to 1.5 L daily. Also please follow up with cardiology in 1 wk from discharge date.    Diagnosis: Severe aortic stenosis  Assessment and Plan of Treatment: Imaging of your heart showed that your have what is called aortic stenosis which means that you have narrowing on one of the valves of your heart that connect one of the chambers of your heart to the aorta. Please avoid dehydration and follow up with Dr. Pacheco as an outpatient in 1 wk from follow up.    Diagnosis: HTN (hypertension)  Assessment and Plan of Treatment: You have high blood pressure. Your blood pressure was managed with amlodipine 10 mg oral daily. Losartan was held because of your decreased renal funciton.  Please continue to HOLD LOSARTAN and continue  taking  Amlodipine 10 mg oral daily as prescribed. Please measure your blood pressure at least once daily. Maintain a healthy diet which includes incorporating more vegetables and decreasing the amount of salt (low sodium) and sugar in your diet such as rice, bread, and pasta low sugar, low fat, low sodium diet. . Please follow up with your primary care provider within one week of your discharge.    Diagnosis: Urinary retention  Assessment and Plan of Treatment: You had urinary retention during your hospital stay. You had catheter placed and were started on a medication called Flomax.  Urinary catheter was removed  on the day of your discharge, but it had to be reinserted because you failed trial of void.  You are getting discharged with the acosta catheter. At the facility where you are getting admitted they can reattempt trial of void in around 5 days from today. Please continue to take flomax 0.4 mg oral daily and follow up with PCP in 1 wk from discharge, you may also need to have evalution with urology if still retaining urine.    Diagnosis: Vitamin D deficiency  Assessment and Plan of Treatment: You were found to have vitamin D deficiency, you will need to be on Vitamin D supplement 70534 IU oral once a week for 8-10 wks as per endocrinology recommendations. Please follow up with your PCP on a regular basis to monitor your vitamin D levels

## 2022-04-16 LAB
CULTURE RESULTS: SIGNIFICANT CHANGE UP
CULTURE RESULTS: SIGNIFICANT CHANGE UP
SPECIMEN SOURCE: SIGNIFICANT CHANGE UP
SPECIMEN SOURCE: SIGNIFICANT CHANGE UP

## 2023-03-14 NOTE — ED ADULT NURSE REASSESSMENT NOTE - GLASGOW COMA SCALE
Pre-Anesthesia VS, & Labs





- Diagnosis





Screening exam, family history of colon cancer





- Procedure





colonoscopy


Vital Signs: 





                                        











Temp Pulse Resp BP Pulse Ox O2 Flow Rate


 


 36.2 C L  110 H  15   139/105 H  97    


 


 03/14/23 12:19  03/14/23 12:19  03/14/23 12:19  03/14/23 12:19  03/14/23 12:19 

 











Height: 5 ft 6.5 in


Weight (kg): 74 kg


Body Mass Index: 25.9


BMI Classification: Overweight





- NPO


>8 hours





- Pregnancy


Is Patient Pregnant?: No





Home Medications and Allergies


Home Medications: 


Ambulatory Orders





No Known Home Medications  03/13/23 











                                        





No Known Home Medications  03/13/23 








Allergies/Adverse Reactions: 


                                    Allergies











Allergy/AdvReac Type Severity Reaction Status Date / Time


 


No Known Drug Allergies Allergy   Verified 03/13/23 13:17














Anes History & Medical History





- Anesthetic History


Anesthesia Complications: reports: No previous complications





- Medical History


Cardiovascular: reports: None


Pulmonary: reports: None


Gastrointestinal: reports: None


Urinary: reports: None


Neuro: reports: None


Musculoskeletal: reports: None


Endocrine/Autoimmune: reports: None


Skin: reports: None


Smoking Status: Never smoker


Psychosocial: reports: No issues indicated


History of Cancer?: No





- Surgical History


Gynecologic: reports: Hysterectomy


Orthopedic: reports: Knee replacement





Exam


General: Alert, Oriented x3, Cooperative, No acute distress


Dental: WNL


Mouth Opening: 3 Fingerbreadth


Neck Mobility: Normal


Mallampati classification: II


Thyromental Distance: 4-6 cm


Mental/Cognitive Status: Alert/Oriented X3, Normal for patient





Plan


Anesthesia Type: General, Total IV


Consent for Procedure(s) Verified and Reviewed: Yes


Code Status: Attempt Resuscitation


ASA classification: 1-Healthy patient


Is this case an emergency?: No
baseline
repeat

## 2023-10-21 NOTE — ED ADULT NURSE NOTE - NSFALLRSKASSESSTYPE_ED_ALL_ED
OBHx:     PTLx3: P1 - @34wks 5-11,   P2 - @35wks, 6-10  P3 - @36wks 9-5   SABx3, D&Cx1    GynHx: Denies h/o fibroids, cysts, abnormal paps, stis Initial (On Arrival)
